# Patient Record
Sex: FEMALE | Race: WHITE | NOT HISPANIC OR LATINO | Employment: FULL TIME | URBAN - METROPOLITAN AREA
[De-identification: names, ages, dates, MRNs, and addresses within clinical notes are randomized per-mention and may not be internally consistent; named-entity substitution may affect disease eponyms.]

---

## 2023-02-11 PROBLEM — E55.9 VITAMIN D DEFICIENCY: Status: ACTIVE | Noted: 2023-02-11

## 2023-02-11 PROBLEM — M06.9 RHEUMATOID ARTHRITIS (MULTI): Status: ACTIVE | Noted: 2023-02-11

## 2023-02-11 PROBLEM — G43.909 MIGRAINE: Status: ACTIVE | Noted: 2023-02-11

## 2023-02-11 PROBLEM — R59.1 LYMPHADENOPATHY: Status: ACTIVE | Noted: 2023-02-11

## 2023-02-11 PROBLEM — M41.9 KYPHOSCOLIOSIS: Status: ACTIVE | Noted: 2023-02-11

## 2023-02-11 PROBLEM — I45.6 WOLFF-PARKINSON-WHITE (WPW) SYNDROME: Status: ACTIVE | Noted: 2023-02-11

## 2023-02-11 PROBLEM — M48.00 SPINAL STENOSIS: Status: ACTIVE | Noted: 2023-02-11

## 2023-02-11 PROBLEM — I34.0 MITRAL REGURGITATION: Status: ACTIVE | Noted: 2023-02-11

## 2023-02-11 PROBLEM — K02.62 DENTAL CARIES EXTENDING INTO DENTIN: Status: ACTIVE | Noted: 2023-02-11

## 2023-02-11 PROBLEM — L73.1 INGROWN HAIR: Status: ACTIVE | Noted: 2023-02-11

## 2023-02-11 PROBLEM — N81.10 FEMALE BLADDER PROLAPSE: Status: ACTIVE | Noted: 2023-02-11

## 2023-02-11 PROBLEM — M81.0 OSTEOPOROSIS, POST-MENOPAUSAL: Status: ACTIVE | Noted: 2023-02-11

## 2023-02-11 PROBLEM — N95.2 ATROPHY OF VAGINA: Status: ACTIVE | Noted: 2023-02-11

## 2023-02-11 PROBLEM — N20.0 NEPHROLITHIASIS: Status: ACTIVE | Noted: 2023-02-11

## 2023-02-11 PROBLEM — M71.21 BAKER CYST, RIGHT: Status: ACTIVE | Noted: 2023-02-11

## 2023-02-11 PROBLEM — N81.4 CYSTOCELE WITH UTERINE PROLAPSE: Status: ACTIVE | Noted: 2023-02-11

## 2023-02-11 PROBLEM — I07.1 TRICUSPID REGURGITATION: Status: ACTIVE | Noted: 2023-02-11

## 2023-02-11 PROBLEM — I10 BENIGN ESSENTIAL HYPERTENSION: Status: ACTIVE | Noted: 2023-02-11

## 2023-02-11 PROBLEM — N39.46 MIXED INCONTINENCE URGE AND STRESS: Status: ACTIVE | Noted: 2023-02-11

## 2023-02-11 RX ORDER — LISINOPRIL 10 MG/1
TABLET ORAL
COMMUNITY
Start: 2014-04-02 | End: 2023-05-30 | Stop reason: SDUPTHER

## 2023-02-11 RX ORDER — FOLIC ACID 1 MG/1
TABLET ORAL
COMMUNITY
End: 2024-01-11 | Stop reason: SDUPTHER

## 2023-02-11 RX ORDER — AMITRIPTYLINE HYDROCHLORIDE 10 MG/1
TABLET, FILM COATED ORAL
COMMUNITY
Start: 2019-08-28 | End: 2023-05-04 | Stop reason: SDUPTHER

## 2023-02-11 RX ORDER — ESTRADIOL 0.1 MG/G
4 CREAM VAGINAL 2 TIMES WEEKLY
COMMUNITY
Start: 2020-06-09

## 2023-02-11 RX ORDER — CHOLECALCIFEROL (VITAMIN D3) 125 MCG
CAPSULE ORAL
COMMUNITY

## 2023-02-11 RX ORDER — ACETAMINOPHEN 500 MG
TABLET ORAL
COMMUNITY

## 2023-02-11 RX ORDER — LEFLUNOMIDE 10 MG/1
TABLET ORAL
COMMUNITY
End: 2024-01-11 | Stop reason: SDUPTHER

## 2023-02-11 RX ORDER — METOPROLOL SUCCINATE 50 MG/1
TABLET, EXTENDED RELEASE ORAL
COMMUNITY
Start: 2022-05-06 | End: 2023-07-11 | Stop reason: SDUPTHER

## 2023-02-11 RX ORDER — HYDROXYCHLOROQUINE SULFATE 200 MG/1
TABLET, FILM COATED ORAL
COMMUNITY
Start: 2021-09-03 | End: 2024-01-30 | Stop reason: SDUPTHER

## 2023-02-11 RX ORDER — GINSENG 100 MG
CAPSULE ORAL
Status: ON HOLD | COMMUNITY
Start: 2014-04-02 | End: 2024-04-13 | Stop reason: ENTERED-IN-ERROR

## 2023-02-11 RX ORDER — DOCUSATE SODIUM 100 MG/1
1 CAPSULE, LIQUID FILLED ORAL DAILY
COMMUNITY

## 2023-03-14 ENCOUNTER — OFFICE VISIT (OUTPATIENT)
Dept: PRIMARY CARE | Facility: CLINIC | Age: 68
End: 2023-03-14
Payer: COMMERCIAL

## 2023-03-14 VITALS
TEMPERATURE: 97.5 F | BODY MASS INDEX: 23.63 KG/M2 | WEIGHT: 112.6 LBS | SYSTOLIC BLOOD PRESSURE: 123 MMHG | HEART RATE: 76 BPM | HEIGHT: 58 IN | DIASTOLIC BLOOD PRESSURE: 82 MMHG | RESPIRATION RATE: 16 BRPM

## 2023-03-14 DIAGNOSIS — N39.46 MIXED INCONTINENCE URGE AND STRESS: ICD-10-CM

## 2023-03-14 DIAGNOSIS — Z12.31 ENCOUNTER FOR SCREENING MAMMOGRAM FOR MALIGNANT NEOPLASM OF BREAST: ICD-10-CM

## 2023-03-14 DIAGNOSIS — I45.6 WOLFF-PARKINSON-WHITE (WPW) SYNDROME: ICD-10-CM

## 2023-03-14 DIAGNOSIS — I10 BENIGN ESSENTIAL HYPERTENSION: ICD-10-CM

## 2023-03-14 DIAGNOSIS — M81.0 OSTEOPOROSIS, POST-MENOPAUSAL: ICD-10-CM

## 2023-03-14 DIAGNOSIS — Z00.00 WELL ADULT HEALTH CHECK: Primary | ICD-10-CM

## 2023-03-14 PROCEDURE — 3079F DIAST BP 80-89 MM HG: CPT | Performed by: INTERNAL MEDICINE

## 2023-03-14 PROCEDURE — 3074F SYST BP LT 130 MM HG: CPT | Performed by: INTERNAL MEDICINE

## 2023-03-14 PROCEDURE — 99213 OFFICE O/P EST LOW 20 MIN: CPT | Performed by: INTERNAL MEDICINE

## 2023-03-14 PROCEDURE — 1159F MED LIST DOCD IN RCRD: CPT | Performed by: INTERNAL MEDICINE

## 2023-03-14 PROCEDURE — 1036F TOBACCO NON-USER: CPT | Performed by: INTERNAL MEDICINE

## 2023-03-14 ASSESSMENT — ENCOUNTER SYMPTOMS
SINUS PAIN: 0
CHILLS: 0
HEADACHES: 0
ABDOMINAL PAIN: 0
VOMITING: 0
WEAKNESS: 0
JOINT SWELLING: 0
SPEECH DIFFICULTY: 0
FATIGUE: 0
SHORTNESS OF BREATH: 0
DIARRHEA: 0
EYE DISCHARGE: 0
DIFFICULTY URINATING: 0
DIAPHORESIS: 0
CONSTIPATION: 0
NAUSEA: 0
FREQUENCY: 0
ARTHRALGIAS: 0
APPETITE CHANGE: 0
COUGH: 0
WHEEZING: 0
CHEST TIGHTNESS: 0
SORE THROAT: 0
DIZZINESS: 0
PALPITATIONS: 0
HYPERTENSION: 1
FEVER: 0

## 2023-03-14 ASSESSMENT — PATIENT HEALTH QUESTIONNAIRE - PHQ9
1. LITTLE INTEREST OR PLEASURE IN DOING THINGS: NOT AT ALL
SUM OF ALL RESPONSES TO PHQ9 QUESTIONS 1 & 2: 0
2. FEELING DOWN, DEPRESSED OR HOPELESS: NOT AT ALL

## 2023-03-14 NOTE — PROGRESS NOTES
"Subjective   Danielle Deshpande is a 68 y.o. female who presents for Hypertension and Sinusitis (Was on office 3 weeks ago for a sinus infection. Was told  to F/U. Patient is feeling much better just a little nasal congestion. ).  Today she is accompanied by alone.     Had gastrointestinal symptoms of vomiting and diarrhea and loss of appetite which was going through the family which was visiting from out of time. Better now and back to normal.   Seen for bronchitis and congestion and sinus pressure.  Seems to have abated.    Was on Augmentin form the [facility] and then continued here.      Hypertension  This is a chronic problem. The problem is unchanged. Pertinent negatives include no chest pain, headaches, palpitations or shortness of breath.   Sinusitis  Pertinent negatives include no chills, congestion, coughing, diaphoresis, ear pain, headaches, shortness of breath or sore throat.       Review of Systems   Constitutional:  Negative for appetite change, chills, diaphoresis, fatigue and fever.   HENT:  Negative for congestion, ear discharge, ear pain, sinus pain and sore throat.    Eyes:  Negative for discharge.   Respiratory:  Negative for cough, chest tightness, shortness of breath and wheezing.    Cardiovascular:  Negative for chest pain, palpitations and leg swelling.   Gastrointestinal:  Negative for abdominal pain, constipation, diarrhea, nausea and vomiting.   Endocrine: Negative for cold intolerance, heat intolerance and polyuria.   Genitourinary:  Negative for difficulty urinating and frequency.   Musculoskeletal:  Negative for arthralgias and joint swelling.   Skin:  Negative for rash.   Neurological:  Negative for dizziness, speech difficulty, weakness and headaches.       Objective   /82   Pulse 76   Temp 36.4 °C (97.5 °F)   Resp 16   Ht 1.473 m (4' 10\")   Wt 51.1 kg (112 lb 9.6 oz)   BMI 23.53 kg/m²   BSA: 1.45 meters squared    Physical Exam  Constitutional:       Appearance: Normal " appearance.   HENT:      Head: Normocephalic.   Pulmonary:      Effort: Pulmonary effort is normal.   Abdominal:      General: Abdomen is flat. Bowel sounds are normal.      Palpations: Abdomen is soft.   Musculoskeletal:      Cervical back: Neck supple.   Skin:     General: Skin is warm and dry.         Assessment/Plan   Problem List Items Addressed This Visit          Circulatory    Benign essential hypertension    Felton-Parkinson-White (WPW) syndrome       Musculoskeletal    Osteoporosis, post-menopausal       Other    Mixed incontinence urge and stress    Well adult health check - Primary    Relevant Orders    Comprehensive Metabolic Panel    CBC    Lipid Panel    Prostate Specific Antigen    TSH with reflex to Free T4 if abnormal    BI mammo bilateral screening tomosynthesis     Encounter Diagnoses   Name Primary?    Well adult health check Yes    Benign essential hypertension     Felton-Parkinson-White (WPW) syndrome     Osteoporosis, post-menopausal     Mixed incontinence urge and stress      West Pizano, DO

## 2023-05-04 DIAGNOSIS — Z00.00 HEALTHCARE MAINTENANCE: ICD-10-CM

## 2023-05-04 RX ORDER — AMITRIPTYLINE HYDROCHLORIDE 10 MG/1
10 TABLET, FILM COATED ORAL NIGHTLY
Qty: 90 TABLET | Refills: 0 | Status: SHIPPED | OUTPATIENT
Start: 2023-05-04 | End: 2023-05-05 | Stop reason: SDUPTHER

## 2023-05-05 DIAGNOSIS — Z00.00 HEALTHCARE MAINTENANCE: ICD-10-CM

## 2023-05-05 RX ORDER — AMITRIPTYLINE HYDROCHLORIDE 10 MG/1
10 TABLET, FILM COATED ORAL NIGHTLY
Qty: 90 TABLET | Refills: 0 | Status: SHIPPED
Start: 2023-05-05 | End: 2023-09-07 | Stop reason: SDUPTHER

## 2023-05-26 ENCOUNTER — TELEPHONE (OUTPATIENT)
Dept: PRIMARY CARE | Facility: CLINIC | Age: 68
End: 2023-05-26
Payer: COMMERCIAL

## 2023-05-30 DIAGNOSIS — I10 BENIGN ESSENTIAL HYPERTENSION: ICD-10-CM

## 2023-05-31 RX ORDER — LISINOPRIL 10 MG/1
10 TABLET ORAL DAILY
Qty: 90 TABLET | Refills: 1 | Status: SHIPPED | OUTPATIENT
Start: 2023-05-31 | End: 2023-09-07 | Stop reason: SDUPTHER

## 2023-07-11 ENCOUNTER — TELEPHONE (OUTPATIENT)
Dept: PRIMARY CARE | Facility: CLINIC | Age: 68
End: 2023-07-11
Payer: COMMERCIAL

## 2023-07-11 DIAGNOSIS — I10 BENIGN ESSENTIAL HYPERTENSION: ICD-10-CM

## 2023-07-11 RX ORDER — METOPROLOL SUCCINATE 50 MG/1
50 TABLET, EXTENDED RELEASE ORAL DAILY
Qty: 90 TABLET | Refills: 3 | Status: SHIPPED | OUTPATIENT
Start: 2023-07-11 | End: 2023-09-07 | Stop reason: SDUPTHER

## 2023-08-17 ENCOUNTER — OFFICE VISIT (OUTPATIENT)
Dept: PRIMARY CARE | Facility: CLINIC | Age: 68
End: 2023-08-17
Payer: COMMERCIAL

## 2023-08-17 VITALS
SYSTOLIC BLOOD PRESSURE: 124 MMHG | RESPIRATION RATE: 18 BRPM | BODY MASS INDEX: 23.2 KG/M2 | HEART RATE: 78 BPM | WEIGHT: 111 LBS | OXYGEN SATURATION: 97 % | TEMPERATURE: 97.3 F | DIASTOLIC BLOOD PRESSURE: 82 MMHG

## 2023-08-17 DIAGNOSIS — J01.90 ACUTE SINUSITIS WITH SYMPTOMS > 10 DAYS: Primary | ICD-10-CM

## 2023-08-17 PROCEDURE — 3079F DIAST BP 80-89 MM HG: CPT | Performed by: NURSE PRACTITIONER

## 2023-08-17 PROCEDURE — 1160F RVW MEDS BY RX/DR IN RCRD: CPT | Performed by: NURSE PRACTITIONER

## 2023-08-17 PROCEDURE — 1126F AMNT PAIN NOTED NONE PRSNT: CPT | Performed by: NURSE PRACTITIONER

## 2023-08-17 PROCEDURE — 99213 OFFICE O/P EST LOW 20 MIN: CPT | Performed by: NURSE PRACTITIONER

## 2023-08-17 PROCEDURE — 3074F SYST BP LT 130 MM HG: CPT | Performed by: NURSE PRACTITIONER

## 2023-08-17 PROCEDURE — 1159F MED LIST DOCD IN RCRD: CPT | Performed by: NURSE PRACTITIONER

## 2023-08-17 PROCEDURE — 1036F TOBACCO NON-USER: CPT | Performed by: NURSE PRACTITIONER

## 2023-08-17 RX ORDER — METHYLPREDNISOLONE 4 MG/1
TABLET ORAL
Qty: 21 TABLET | Refills: 0 | Status: SHIPPED | OUTPATIENT
Start: 2023-08-17 | End: 2023-08-24

## 2023-08-17 RX ORDER — AMOXICILLIN AND CLAVULANATE POTASSIUM 875; 125 MG/1; MG/1
875 TABLET, FILM COATED ORAL 2 TIMES DAILY
Qty: 20 TABLET | Refills: 0 | Status: SHIPPED | OUTPATIENT
Start: 2023-08-17 | End: 2023-08-27

## 2023-08-17 ASSESSMENT — ENCOUNTER SYMPTOMS
DIARRHEA: 0
COUGH: 0
SINUS PAIN: 1
NAUSEA: 0
FEVER: 0
CONSTIPATION: 0
HEADACHES: 1
RHINORRHEA: 1
APPETITE CHANGE: 0
CHILLS: 0
FATIGUE: 1
MYALGIAS: 0
ABDOMINAL PAIN: 0
SINUS PRESSURE: 1
SHORTNESS OF BREATH: 0
SORE THROAT: 0
WHEEZING: 0

## 2023-08-17 NOTE — PROGRESS NOTES
"Subjective   Patient ID: Danielle Deshpande is a 68 y.o. female who presents for Sinusitis.    Patient has been very stressed since July. For the past three weeks, she has had some sinus congestion. Pt has been using flonase and says that her left nostril is irritated. She has had thick clear nasal drainage. Patient is vaccinated against COVID. She declines need for testing.  Pt tried sudafed but she says that \"it made her heart erratic\".     Patient was diagnosed with vestibular migraines seven years ago. Patient is on amitriptyline and she increases it (two weeks at a time) when she has these migraines. Patient says that she has pressure in her left cheek. She gets a muscle spasm in her trapezoid.           Review of Systems   Constitutional:  Positive for fatigue. Negative for appetite change, chills and fever.   HENT:  Positive for congestion, postnasal drip, rhinorrhea, sinus pressure and sinus pain. Negative for sore throat.    Respiratory:  Negative for cough, shortness of breath and wheezing.    Cardiovascular:  Negative for chest pain.   Gastrointestinal:  Negative for abdominal pain, constipation, diarrhea and nausea.   Musculoskeletal:  Negative for myalgias.   Neurological:  Positive for headaches.       Objective   /82   Pulse 78   Temp 36.3 °C (97.3 °F) (Temporal)   Resp 18   Wt 50.3 kg (111 lb)   SpO2 97%   BMI 23.20 kg/m²     Physical Exam  Vitals reviewed.   Constitutional:       General: She is not in acute distress.     Appearance: Normal appearance. She is not ill-appearing.   HENT:      Head: Atraumatic.      Right Ear: Tympanic membrane, ear canal and external ear normal.      Left Ear: Tympanic membrane, ear canal and external ear normal.      Nose: Congestion and rhinorrhea present.      Right Sinus: No maxillary sinus tenderness or frontal sinus tenderness.      Left Sinus: Maxillary sinus tenderness and frontal sinus tenderness present.      Mouth/Throat:      Pharynx: No " oropharyngeal exudate or posterior oropharyngeal erythema.      Comments: Uvula midline  Eyes:      Conjunctiva/sclera: Conjunctivae normal.   Cardiovascular:      Rate and Rhythm: Normal rate and regular rhythm.      Heart sounds: Normal heart sounds. No murmur heard.  Pulmonary:      Effort: Pulmonary effort is normal.      Breath sounds: Normal breath sounds. No wheezing or rhonchi.   Skin:     General: Skin is warm and dry.   Neurological:      General: No focal deficit present.      Mental Status: She is alert.   Psychiatric:         Mood and Affect: Mood normal.         Behavior: Behavior normal.     Assessment/Plan   Problem List Items Addressed This Visit    None  Visit Diagnoses       Acute sinusitis with symptoms > 10 days    -  Primary    Relevant Medications    amoxicillin-pot clavulanate (Augmentin) 875-125 mg tablet    methylPREDNISolone (Medrol Dospak) 4 mg tablets        Pt declined need for COVID test. Will start pt on augmentin at this time. Will also start patient on medrol dusty. Reminded pt to avoid other anti-inflammatories while on the steroids; she agreed. Advised patient on use of humidifier and hot steam treatments. Discussed that patient is to drink plenty of fluids and stay well hydrated. Can take tylenol as needed for any fevers or discomfort. Patient can use flonase as well. Discussed that patient is to go to the ER for any chest pain, difficulty breathing, shortness of breath or new/concerning symptoms; she agreed. Pt to follow up in 2-3 days if no better despite the use of the medications.

## 2023-08-30 ENCOUNTER — LAB (OUTPATIENT)
Dept: LAB | Facility: LAB | Age: 68
End: 2023-08-30
Payer: COMMERCIAL

## 2023-08-30 DIAGNOSIS — Z00.00 WELL ADULT HEALTH CHECK: ICD-10-CM

## 2023-08-30 LAB
ALANINE AMINOTRANSFERASE (SGPT) (U/L) IN SER/PLAS: 16 U/L (ref 7–45)
ALBUMIN (G/DL) IN SER/PLAS: 3.9 G/DL (ref 3.4–5)
ALKALINE PHOSPHATASE (U/L) IN SER/PLAS: 73 U/L (ref 33–136)
ANION GAP IN SER/PLAS: 10 MMOL/L (ref 10–20)
ASPARTATE AMINOTRANSFERASE (SGOT) (U/L) IN SER/PLAS: 23 U/L (ref 9–39)
BILIRUBIN TOTAL (MG/DL) IN SER/PLAS: 0.5 MG/DL (ref 0–1.2)
CALCIUM (MG/DL) IN SER/PLAS: 9 MG/DL (ref 8.6–10.3)
CARBON DIOXIDE, TOTAL (MMOL/L) IN SER/PLAS: 32 MMOL/L (ref 21–32)
CHLORIDE (MMOL/L) IN SER/PLAS: 103 MMOL/L (ref 98–107)
CHOLESTEROL (MG/DL) IN SER/PLAS: 141 MG/DL (ref 0–199)
CHOLESTEROL IN HDL (MG/DL) IN SER/PLAS: 56.5 MG/DL
CHOLESTEROL/HDL RATIO: 2.5
CREATININE (MG/DL) IN SER/PLAS: 0.83 MG/DL (ref 0.5–1.05)
ERYTHROCYTE DISTRIBUTION WIDTH (RATIO) BY AUTOMATED COUNT: 12.2 % (ref 11.5–14.5)
ERYTHROCYTE MEAN CORPUSCULAR HEMOGLOBIN CONCENTRATION (G/DL) BY AUTOMATED: 33.5 G/DL (ref 32–36)
ERYTHROCYTE MEAN CORPUSCULAR VOLUME (FL) BY AUTOMATED COUNT: 94 FL (ref 80–100)
ERYTHROCYTES (10*6/UL) IN BLOOD BY AUTOMATED COUNT: 4.5 X10E12/L (ref 4–5.2)
GFR FEMALE: 76 ML/MIN/1.73M2
GLUCOSE (MG/DL) IN SER/PLAS: 79 MG/DL (ref 74–99)
HEMATOCRIT (%) IN BLOOD BY AUTOMATED COUNT: 42.4 % (ref 36–46)
HEMOGLOBIN (G/DL) IN BLOOD: 14.2 G/DL (ref 12–16)
LDL: 72 MG/DL (ref 0–99)
LEUKOCYTES (10*3/UL) IN BLOOD BY AUTOMATED COUNT: 4.4 X10E9/L (ref 4.4–11.3)
PLATELETS (10*3/UL) IN BLOOD AUTOMATED COUNT: 218 X10E9/L (ref 150–450)
POTASSIUM (MMOL/L) IN SER/PLAS: 4 MMOL/L (ref 3.5–5.3)
PROSTATE SPECIFIC AG (NG/ML) IN SER/PLAS: <0.01 NG/ML (ref 0–0.1)
PROTEIN TOTAL: 5.9 G/DL (ref 6.4–8.2)
SODIUM (MMOL/L) IN SER/PLAS: 141 MMOL/L (ref 136–145)
THYROTROPIN (MIU/L) IN SER/PLAS BY DETECTION LIMIT <= 0.05 MIU/L: 2.19 MIU/L (ref 0.44–3.98)
TRIGLYCERIDE (MG/DL) IN SER/PLAS: 61 MG/DL (ref 0–149)
UREA NITROGEN (MG/DL) IN SER/PLAS: 20 MG/DL (ref 6–23)
VLDL: 12 MG/DL (ref 0–40)

## 2023-08-30 PROCEDURE — 84153 ASSAY OF PSA TOTAL: CPT

## 2023-08-30 PROCEDURE — 85027 COMPLETE CBC AUTOMATED: CPT

## 2023-08-30 PROCEDURE — 80061 LIPID PANEL: CPT

## 2023-08-30 PROCEDURE — 80053 COMPREHEN METABOLIC PANEL: CPT

## 2023-08-30 PROCEDURE — 36415 COLL VENOUS BLD VENIPUNCTURE: CPT

## 2023-08-30 PROCEDURE — 84443 ASSAY THYROID STIM HORMONE: CPT

## 2023-09-07 ENCOUNTER — OFFICE VISIT (OUTPATIENT)
Dept: PRIMARY CARE | Facility: CLINIC | Age: 68
End: 2023-09-07
Payer: COMMERCIAL

## 2023-09-07 VITALS
DIASTOLIC BLOOD PRESSURE: 78 MMHG | HEART RATE: 84 BPM | TEMPERATURE: 97.8 F | SYSTOLIC BLOOD PRESSURE: 133 MMHG | OXYGEN SATURATION: 97 % | BODY MASS INDEX: 23.97 KG/M2 | RESPIRATION RATE: 16 BRPM | WEIGHT: 114.2 LBS | HEIGHT: 58 IN

## 2023-09-07 DIAGNOSIS — Z23 NEED FOR VACCINATION: ICD-10-CM

## 2023-09-07 DIAGNOSIS — I45.6 WOLFF-PARKINSON-WHITE (WPW) SYNDROME: ICD-10-CM

## 2023-09-07 DIAGNOSIS — M06.9 RHEUMATOID ARTHRITIS INVOLVING MULTIPLE SITES, UNSPECIFIED WHETHER RHEUMATOID FACTOR PRESENT (MULTI): ICD-10-CM

## 2023-09-07 DIAGNOSIS — Z00.00 HEALTHCARE MAINTENANCE: ICD-10-CM

## 2023-09-07 DIAGNOSIS — Z11.59 NEED FOR HEPATITIS C SCREENING TEST: ICD-10-CM

## 2023-09-07 DIAGNOSIS — M81.0 OSTEOPOROSIS, POST-MENOPAUSAL: ICD-10-CM

## 2023-09-07 DIAGNOSIS — Z13.1 DIABETES MELLITUS SCREENING: ICD-10-CM

## 2023-09-07 DIAGNOSIS — I48.0 PAF (PAROXYSMAL ATRIAL FIBRILLATION) (MULTI): ICD-10-CM

## 2023-09-07 DIAGNOSIS — I10 BENIGN ESSENTIAL HYPERTENSION: ICD-10-CM

## 2023-09-07 DIAGNOSIS — N20.0 NEPHROLITHIASIS: ICD-10-CM

## 2023-09-07 DIAGNOSIS — Z00.00 ROUTINE GENERAL MEDICAL EXAMINATION AT HEALTH CARE FACILITY: Primary | ICD-10-CM

## 2023-09-07 DIAGNOSIS — Z12.11 SCREENING FOR COLORECTAL CANCER: ICD-10-CM

## 2023-09-07 DIAGNOSIS — G43.909 MIGRAINE WITHOUT STATUS MIGRAINOSUS, NOT INTRACTABLE, UNSPECIFIED MIGRAINE TYPE: ICD-10-CM

## 2023-09-07 DIAGNOSIS — M41.9 KYPHOSCOLIOSIS: ICD-10-CM

## 2023-09-07 DIAGNOSIS — Z12.12 SCREENING FOR COLORECTAL CANCER: ICD-10-CM

## 2023-09-07 DIAGNOSIS — Z78.0 ASYMPTOMATIC MENOPAUSAL STATE: ICD-10-CM

## 2023-09-07 PROBLEM — H93.12 LEFT-SIDED TINNITUS: Status: RESOLVED | Noted: 2018-08-06 | Resolved: 2023-09-07

## 2023-09-07 PROBLEM — H90.3 SENSORINEURAL HEARING LOSS, BILATERAL: Status: ACTIVE | Noted: 2018-08-06

## 2023-09-07 PROBLEM — H93.12 LEFT-SIDED TINNITUS: Status: ACTIVE | Noted: 2018-08-06

## 2023-09-07 PROBLEM — R82.994 HYPERCALCIURIA: Status: ACTIVE | Noted: 2023-09-07

## 2023-09-07 PROBLEM — I49.9 ARRHYTHMIA: Status: ACTIVE | Noted: 2023-09-07

## 2023-09-07 PROBLEM — H81.10 BPPV (BENIGN PAROXYSMAL POSITIONAL VERTIGO): Status: ACTIVE | Noted: 2021-01-04

## 2023-09-07 PROBLEM — R59.0 LYMPHADENOPATHY, POSTERIOR CERVICAL: Status: ACTIVE | Noted: 2023-09-07

## 2023-09-07 PROCEDURE — 90471 IMMUNIZATION ADMIN: CPT | Performed by: INTERNAL MEDICINE

## 2023-09-07 PROCEDURE — 1126F AMNT PAIN NOTED NONE PRSNT: CPT | Performed by: INTERNAL MEDICINE

## 2023-09-07 PROCEDURE — 99214 OFFICE O/P EST MOD 30 MIN: CPT | Performed by: INTERNAL MEDICINE

## 2023-09-07 PROCEDURE — 90677 PCV20 VACCINE IM: CPT | Performed by: INTERNAL MEDICINE

## 2023-09-07 PROCEDURE — 90662 IIV NO PRSV INCREASED AG IM: CPT | Performed by: INTERNAL MEDICINE

## 2023-09-07 PROCEDURE — 1036F TOBACCO NON-USER: CPT | Performed by: INTERNAL MEDICINE

## 2023-09-07 PROCEDURE — 1159F MED LIST DOCD IN RCRD: CPT | Performed by: INTERNAL MEDICINE

## 2023-09-07 PROCEDURE — 90472 IMMUNIZATION ADMIN EACH ADD: CPT | Performed by: INTERNAL MEDICINE

## 2023-09-07 PROCEDURE — 1170F FXNL STATUS ASSESSED: CPT | Performed by: INTERNAL MEDICINE

## 2023-09-07 PROCEDURE — 93000 ELECTROCARDIOGRAM COMPLETE: CPT | Performed by: INTERNAL MEDICINE

## 2023-09-07 PROCEDURE — G0439 PPPS, SUBSEQ VISIT: HCPCS | Performed by: INTERNAL MEDICINE

## 2023-09-07 PROCEDURE — 3078F DIAST BP <80 MM HG: CPT | Performed by: INTERNAL MEDICINE

## 2023-09-07 PROCEDURE — 1160F RVW MEDS BY RX/DR IN RCRD: CPT | Performed by: INTERNAL MEDICINE

## 2023-09-07 PROCEDURE — 3075F SYST BP GE 130 - 139MM HG: CPT | Performed by: INTERNAL MEDICINE

## 2023-09-07 RX ORDER — AMITRIPTYLINE HYDROCHLORIDE 10 MG/1
15 TABLET, FILM COATED ORAL NIGHTLY
Qty: 135 TABLET | Refills: 3 | Status: SHIPPED | OUTPATIENT
Start: 2023-09-07 | End: 2024-02-20 | Stop reason: SDUPTHER

## 2023-09-07 RX ORDER — METOPROLOL SUCCINATE 50 MG/1
50 TABLET, EXTENDED RELEASE ORAL DAILY
Qty: 90 TABLET | Refills: 3 | Status: SHIPPED | OUTPATIENT
Start: 2023-09-07 | End: 2024-05-08 | Stop reason: SDUPTHER

## 2023-09-07 RX ORDER — LISINOPRIL 10 MG/1
10 TABLET ORAL DAILY
Qty: 90 TABLET | Refills: 3 | Status: SHIPPED | OUTPATIENT
Start: 2023-09-07 | End: 2024-09-06

## 2023-09-07 ASSESSMENT — PAIN SCALES - GENERAL: PAINLEVEL: 0-NO PAIN

## 2023-09-07 ASSESSMENT — PATIENT HEALTH QUESTIONNAIRE - PHQ9
SUM OF ALL RESPONSES TO PHQ9 QUESTIONS 1 AND 2: 0
2. FEELING DOWN, DEPRESSED OR HOPELESS: NOT AT ALL
1. LITTLE INTEREST OR PLEASURE IN DOING THINGS: NOT AT ALL

## 2023-09-07 ASSESSMENT — ENCOUNTER SYMPTOMS
LOSS OF SENSATION IN FEET: 0
OCCASIONAL FEELINGS OF UNSTEADINESS: 0
DEPRESSION: 0

## 2023-09-07 ASSESSMENT — ACTIVITIES OF DAILY LIVING (ADL)
TAKING_MEDICATION: INDEPENDENT
DRESSING: INDEPENDENT
DOING_HOUSEWORK: INDEPENDENT
GROCERY_SHOPPING: INDEPENDENT
MANAGING_FINANCES: INDEPENDENT
BATHING: INDEPENDENT

## 2023-09-07 NOTE — PROGRESS NOTES
"Subjective   Danielle Deshpande is a 68 y.o. female who presents for Medicare Annual Wellness Visit Subsequent.      Patient has POA and living will but not on file   Last mammogram was 3.14.2023  Last Dexa 12.3.2020  Last colonoscopy was 3.30.2015    Has been through some family issues.  Daughter had a wedding and mom passed at 88 years old quietly in her sleep.    Reviewed history of migraine headaches and had increased her dose to 15 mg.  For the last 2 months this has been an issue.  Has had tight trapezoid muscle on the left but associated likely with her scoliosis.  Gets a left sided headache and visual changes and vision gets less clear but no lines or  lights seen.    Daily at first and varying degrees of pain and ice helped.  Now has decreased to sporadic presentation.  Pain sometimes last 15 minutes then decreases.  Most occur in the morning.  Occasionally improved with cold water bottle on her shoulder helps.    Neck cracks frequently.    Rheumatologist diagnosed her with RA.    She is a Holistic rheumatologist and trying osteobiflex, hydroxychloroquine.   Getting Dental work so holding off for now on treatment.        Review of Systems   All other systems reviewed and are negative.      Objective   /78   Pulse 84   Temp 36.6 °C (97.8 °F)   Resp 16   Ht 1.473 m (4' 10\")   Wt 51.8 kg (114 lb 3.2 oz)   SpO2 97%   BMI 23.87 kg/m²       Physical Exam  Constitutional:       General: She is not in acute distress.     Appearance: She is not toxic-appearing.   HENT:      Right Ear: Tympanic membrane, ear canal and external ear normal. There is no impacted cerumen.      Left Ear: Tympanic membrane, ear canal and external ear normal. There is no impacted cerumen.      Nose: Nose normal.      Mouth/Throat:      Mouth: Mucous membranes are dry.      Pharynx: Oropharynx is clear.   Eyes:      Extraocular Movements: Extraocular movements intact.      Conjunctiva/sclera: Conjunctivae normal.      Pupils: " Pupils are equal, round, and reactive to light.   Cardiovascular:      Rate and Rhythm: Normal rate and regular rhythm.      Pulses: Normal pulses.      Heart sounds: Normal heart sounds.   Pulmonary:      Effort: Pulmonary effort is normal.      Breath sounds: Normal breath sounds.   Abdominal:      General: Abdomen is flat.   Musculoskeletal:         General: Normal range of motion.        Arms:       Cervical back: Normal range of motion and neck supple.      Right lower leg: No edema.      Left lower leg: No edema.      Comments: Significant scoliosis, with right shoulder elevation and hip height discrepancy.  Some rotation of her left leg with hip rotation noted.   Skin:     General: Skin is warm and dry.   Neurological:      Mental Status: She is alert.   Psychiatric:         Mood and Affect: Mood normal.         Behavior: Behavior normal.         Assessment/Plan   Problem List Items Addressed This Visit    None    No diagnosis found.  West Pizano DO

## 2023-09-08 ENCOUNTER — APPOINTMENT (OUTPATIENT)
Dept: PRIMARY CARE | Facility: CLINIC | Age: 68
End: 2023-09-08
Payer: COMMERCIAL

## 2023-10-09 DIAGNOSIS — N20.0 NEPHROLITHIASIS: ICD-10-CM

## 2023-10-10 ENCOUNTER — EVALUATION (OUTPATIENT)
Dept: PHYSICAL THERAPY | Facility: CLINIC | Age: 68
End: 2023-10-10
Payer: COMMERCIAL

## 2023-10-10 DIAGNOSIS — M41.9 KYPHOSCOLIOSIS: ICD-10-CM

## 2023-10-10 DIAGNOSIS — M54.9 BILATERAL BACK PAIN, UNSPECIFIED BACK LOCATION, UNSPECIFIED CHRONICITY: ICD-10-CM

## 2023-10-10 DIAGNOSIS — M54.2 NECK PAIN: Primary | ICD-10-CM

## 2023-10-10 DIAGNOSIS — R53.1 WEAKNESS: ICD-10-CM

## 2023-10-10 PROCEDURE — 97162 PT EVAL MOD COMPLEX 30 MIN: CPT | Mod: GP | Performed by: PHYSICAL THERAPIST

## 2023-10-10 ASSESSMENT — PAIN SCALES - GENERAL
PAINLEVEL_OUTOF10: 0 - NO PAIN
PAINLEVEL_OUTOF10: 2

## 2023-10-10 ASSESSMENT — PAIN - FUNCTIONAL ASSESSMENT: PAIN_FUNCTIONAL_ASSESSMENT: 0-10

## 2023-10-10 NOTE — Clinical Note
October 10, 2023     Patient: Danielle Deshpande   YOB: 1955   Date of Visit: 10/10/2023       To Whom It May Concern:    It is my medical opinion that Danielle Deshpande {Work release (duty restriction):09983}.    If you have any questions or concerns, please don't hesitate to call.         Sincerely,        Anna Landrum, PT    CC: No Recipients

## 2023-10-10 NOTE — Clinical Note
October 10, 2023     Patient: Danielle Deshpande   YOB: 1955   Date of Visit: 10/10/2023       To Whom it May Concern:    Danielle Deshpande was seen in my clinic on 10/10/2023. She {Return to school/sport:78114}.    If you have any questions or concerns, please don't hesitate to call.         Sincerely,          Anna Landrum, PT        CC: No Recipients

## 2023-10-10 NOTE — PROGRESS NOTES
Physical Therapy Evaluation    Patient Name: Danielle Deshpande  MRN: [id number]  Today's Date: 10/10/2023  Time Calculation  Start Time: 1637  Stop Time: 1715  Time Calculation (min): 38 min    Assessment   Pt demonstrates decreased lumbar/hipROM, shoulder/core/hip strength, activity tolerance, postural impairments, gait deviations, and pain resulting in difficulty with ADLs/functional mobility/work tasks per FAUSTO.  Pt will benefit from PT services to improve stability of the spine, provide proximal and distal support to the spine, reduce pain, as well as maximize ADLs/functional mobility/work tasks.    Pt verbalizes understanding of all pt education.       Plan  Treatment/Interventions: Education/ Instruction, Gait training, Manual therapy, Neuromuscular re-education, Self care/ home management, Therapeutic activities, Therapeutic exercises  PT Frequency:  (1-2x/week for 5-6 weeks)  Pt to call back and schedule.      Subjective   General:  General  Reason for Referral: Kyphoscoliosis  Referred By: Dr. West Pizano (PCP)  Precautions:  Precautions  Precautions Comment: Significant PMHx (6 items):  1. Hx spinal fusion surgery 1980 to attempt to correct kyphoscoliosis  2. Allergic to adhesive from bandages  3. Mitral and tricuspid regurgitation, Felton-Parkinson-White syndrome/fast heartbeat, A-fib (all treated with medication/F/U with cardiologist)  4. Osteoporosis  5. BPPV causing intermittent dizziness (treated with medication/F/U with neurologist)  6. RA (F/U with rheumatology)  Pain:  Pain Assessment: 0-10  Pain Score: 2  Pain Location: Neck  Multiple Pain Sites: Two    Pt arrives right on time to appointment.  Due to  complications pt was not provided with IE paperwork until 10 min into appointment time.  This and time to complete paperwork result in a shortened session.    Pt is 68 year old female who presents with (L) sided neck pain and (B) mid-lower back pain/extreme fatigue.  Pt was 12 years old  when she was dx with kyphoscoliosis.  From the ages 12 to 16 she wore a brace to attempt to correct the curvature.  The curvature continued to increase until she was in her early 20's resulting in her having a spinal fusion in 1980.  Pt notes onset of (L) sided neck and mid-back pain over the summer 2023 with stressful life events.  This pain has been improving but certain activities still can cause this pain.  Pt denies being given any precautions for current s/s.    IMAGING: Nothing recent    PMHx: RA (F/U with rheumatology), Hydronephrosis (R) kidney 2020, Kidney stones with hx of surgical removal (treated with medication/F/U with nephrologist), HTN (treated with medication, Mitral and tricuspid regurgitation (treated with medication/F/U with cardiologist), Felton-Parkinson-White (WPW) syndrome/fast heartbeat (treated with medication/F/U with cardiologist), A-fib (treated with medication/F/U with cardiologist), Osteoporosis, Migraines (treated by neurologist), Hx spinal fusion surgery 1980, (L) knee meniscectomy 10+ years ago, Hx fx (L) foot treated conservatively 10+ years ago, Hx fx (R) knee treated conservatively 5-10 years, BPPV causing intermittent dizziness (treated with medication/F/U with neurologist)    ALLERGIES: Antihistamine [Diphenhydramine Hcl], Antihistamine, Levofloxacin, Procaine, Adhesive on bandage    PAIN:   (L) sided neck and (B) mid-lower back pain  Pt's neck is the most concerning s/s  (L) sided neck pain  2/10 currently  Ranges 2/10 to 6-7/10  Described as tender, aching, burning, sharp  (B) mid-lower back pain  0/10 currently  Ranges 0/10 to 5/10  Described as soreness, dull, ache, extreme fatigue  Improves with CP massage and pain medication  Pt notes fairly constant n+t in (R>L) hands and constant n+t in (B) knees to feet that pt's rheumatologist is aware of; rheumatologist has ordered an EMG for these s/s    FUNCTIONAL:   - Current: Pt notes increased pain with stress, lifting >20# at  work/holding granddaughter, reaching OH with (L) UE into cabinet, and vacuuming.   - Previous: Pt was limited with ADLs but with less s/s    OCCUPATION: "[employer]       Objective   ROM  Shoulder AROM:  - Flexion: (B) 130 deg  - ABD: (B) WFL  - ER: (B) WFL  - IR: (B) WFL    Lumbar AROM:  - Extension  20%  - Flexion 70%  - SB (L) 20%, tight (L) neck     (R) 20%, tight (L) neck  - Rotation (L) 10%     (R) 30%  _____________________________________________    STRENGTH  Shoulder  - Flexion: (B) 3+/5  - ABD: (L) 4-/5, pain     (R) 4+/5  - ER: (B) 4/5  - IR: (B) 4/5    While seated pt demonstrates slight TA activation with Valsalva compensation.    Hip  - Extension (L) 4-/5      (R) 4/5    ______________________________________________________    POSTURE  Pt demonstrates significant forward head posture, significant kyphoscoliosis, and moderate rounded shoulders.  _______________________________________________________________    SCAPULAR MOVEMENT  During (B) shoulder ABD AROM to 90 deg, pt's scapulae demonstrate   _____________________________________________________________    PALPATION  Pt demonstrates tenderness to palpation of (L) UT/LS.  Significant tension noted (L>R) UT/LS, cervical paraspinals, and SCM.  ____________________________________________________    GAIT  Pt ambulates with moderate to significant anterior trunk lean throughout gait cycle and lateral trunk lean during (L) stance.      Outcome Measures:  Other Measures  Oswestry Disablity Index (FAUSTO): 11/50 = 22%       Treatment:  Visit 1 of 12 max  PT POC for 1-2x/week for 5-6 weeks    Need to discuss:  - DOMS    Exercises to add:  - UBE F/R  - Supine chin tucks  - (B) UT/LS stretches  - SL (B) shoulder ER/ABD  - TB row  - TB shoulder extension  - Supine TA activation practice  - Supine TA march  - Supine TA hip ABD      OP EDUCATION:   10-10-23: Pt educated on PT POC/benefits and okay discomfort vs pain that requires modification      Goals:  1. Pt will be  (I) with HEP for carryover of gains from skilled PT.  2. Pt will report reduction in pain to 4/10 at worst for improved ability to complete ADLs and enjoyable activities.  3. Improved FAUSTO score to demonstrate an improvement in quality of life.  4. Improved TA activation to at least moderate in standing for improved spinal stability.  5. Improved (B) shoulder strength to at least 4+/5 for improved stability.  6. Improved (B) hip extension strength to at least 4+/5 for improved stability.  7. Pt will be able to reach into top kitchen cabinet with (L) UE with minimal to no pain.  8. Pt will be able to lift up to 30# at work/hold her granddaughter with minimal to no pain.

## 2023-10-13 ENCOUNTER — LAB (OUTPATIENT)
Dept: LAB | Facility: LAB | Age: 68
End: 2023-10-13
Payer: COMMERCIAL

## 2023-10-13 DIAGNOSIS — Z11.59 NEED FOR HEPATITIS C SCREENING TEST: ICD-10-CM

## 2023-10-13 DIAGNOSIS — Z13.1 DIABETES MELLITUS SCREENING: ICD-10-CM

## 2023-10-13 LAB
ALBUMIN SERPL BCP-MCNC: 3.9 G/DL (ref 3.4–5)
ALP SERPL-CCNC: 74 U/L (ref 33–136)
ALT SERPL W P-5'-P-CCNC: 17 U/L (ref 7–45)
ANION GAP SERPL CALC-SCNC: 12 MMOL/L (ref 10–20)
AST SERPL W P-5'-P-CCNC: 23 U/L (ref 9–39)
BILIRUB SERPL-MCNC: 0.7 MG/DL (ref 0–1.2)
BUN SERPL-MCNC: 18 MG/DL (ref 6–23)
CALCIUM SERPL-MCNC: 9 MG/DL (ref 8.6–10.3)
CHLORIDE SERPL-SCNC: 104 MMOL/L (ref 98–107)
CO2 SERPL-SCNC: 31 MMOL/L (ref 21–32)
CREAT SERPL-MCNC: 0.82 MG/DL (ref 0.5–1.05)
GFR SERPL CREATININE-BSD FRML MDRD: 78 ML/MIN/1.73M*2
GLUCOSE SERPL-MCNC: 72 MG/DL (ref 74–99)
HCV AB SER QL: NONREACTIVE
POTASSIUM SERPL-SCNC: 3.9 MMOL/L (ref 3.5–5.3)
PROT SERPL-MCNC: 6.2 G/DL (ref 6.4–8.2)
SODIUM SERPL-SCNC: 143 MMOL/L (ref 136–145)

## 2023-10-13 PROCEDURE — 86803 HEPATITIS C AB TEST: CPT

## 2023-10-13 PROCEDURE — 36415 COLL VENOUS BLD VENIPUNCTURE: CPT

## 2023-10-13 PROCEDURE — 80053 COMPREHEN METABOLIC PANEL: CPT

## 2023-10-24 ENCOUNTER — OFFICE VISIT (OUTPATIENT)
Dept: PRIMARY CARE | Facility: CLINIC | Age: 68
End: 2023-10-24
Payer: COMMERCIAL

## 2023-10-24 VITALS
SYSTOLIC BLOOD PRESSURE: 133 MMHG | RESPIRATION RATE: 16 BRPM | DIASTOLIC BLOOD PRESSURE: 76 MMHG | TEMPERATURE: 97.8 F | HEIGHT: 58 IN | OXYGEN SATURATION: 96 % | WEIGHT: 116.4 LBS | BODY MASS INDEX: 24.43 KG/M2

## 2023-10-24 DIAGNOSIS — M06.9 RHEUMATOID ARTHRITIS INVOLVING MULTIPLE SITES, UNSPECIFIED WHETHER RHEUMATOID FACTOR PRESENT (MULTI): ICD-10-CM

## 2023-10-24 DIAGNOSIS — M41.9 KYPHOSCOLIOSIS: ICD-10-CM

## 2023-10-24 DIAGNOSIS — Z00.00 ROUTINE GENERAL MEDICAL EXAMINATION AT HEALTH CARE FACILITY: ICD-10-CM

## 2023-10-24 DIAGNOSIS — I48.0 PAF (PAROXYSMAL ATRIAL FIBRILLATION) (MULTI): Primary | ICD-10-CM

## 2023-10-24 PROBLEM — R59.1 LYMPHADENOPATHY: Status: RESOLVED | Noted: 2023-02-11 | Resolved: 2023-10-24

## 2023-10-24 PROBLEM — R53.1 WEAKNESS: Status: RESOLVED | Noted: 2023-10-10 | Resolved: 2023-10-24

## 2023-10-24 PROBLEM — R59.0 LYMPHADENOPATHY, POSTERIOR CERVICAL: Status: RESOLVED | Noted: 2023-09-07 | Resolved: 2023-10-24

## 2023-10-24 PROCEDURE — 3078F DIAST BP <80 MM HG: CPT | Performed by: INTERNAL MEDICINE

## 2023-10-24 PROCEDURE — 1160F RVW MEDS BY RX/DR IN RCRD: CPT | Performed by: INTERNAL MEDICINE

## 2023-10-24 PROCEDURE — 1159F MED LIST DOCD IN RCRD: CPT | Performed by: INTERNAL MEDICINE

## 2023-10-24 PROCEDURE — 3075F SYST BP GE 130 - 139MM HG: CPT | Performed by: INTERNAL MEDICINE

## 2023-10-24 PROCEDURE — 1036F TOBACCO NON-USER: CPT | Performed by: INTERNAL MEDICINE

## 2023-10-24 PROCEDURE — 99213 OFFICE O/P EST LOW 20 MIN: CPT | Performed by: INTERNAL MEDICINE

## 2023-10-24 PROCEDURE — 1126F AMNT PAIN NOTED NONE PRSNT: CPT | Performed by: INTERNAL MEDICINE

## 2023-10-24 ASSESSMENT — PAIN SCALES - GENERAL: PAINLEVEL: 0-NO PAIN

## 2023-10-24 NOTE — PROGRESS NOTES
"Subjective   Danielle Deshpande is a 68 y.o. female who presents for Follow-up (Labs ).    Patient started PT on 10.10.2023 but only had one visit  which was covering health history, has next apt on 11.7.2023  Bone density scheduled for  10.31.2023  Colonoscopy scheduled for 11.13.2023  Patient has an apt. Scheduled for a rheumatologist 11.28.2023 with Dr. Deneen Alonso  Doing well and has tests pending.  Labs reviewed.  NO complaints.        Review of Systems    Objective   /76   Temp 36.6 °C (97.8 °F)   Resp 16   Ht 1.473 m (4' 10\")   Wt 52.8 kg (116 lb 6.4 oz)   SpO2 96%   BMI 24.33 kg/m²       Physical Exam  Constitutional:       Appearance: Normal appearance.   HENT:      Head: Normocephalic.   Cardiovascular:      Rate and Rhythm: Normal rate and regular rhythm.   Pulmonary:      Effort: Pulmonary effort is normal.   Musculoskeletal:      Cervical back: Neck supple.   Skin:     General: Skin is warm and dry.   Psychiatric:         Mood and Affect: Mood normal.         Assessment/Plan   Problem List Items Addressed This Visit       Kyphoscoliosis    Rheumatoid arthritis (CMS/HCC)    PAF (paroxysmal atrial fibrillation) (CMS/HCC) - Primary     Other Visit Diagnoses       Routine general medical examination at health care facility              Encounter Diagnoses   Name Primary?    Rheumatoid arthritis involving multiple sites, unspecified whether rheumatoid factor present (CMS/HCC)     Kyphoscoliosis     Routine general medical examination at health care facility     PAF (paroxysmal atrial fibrillation) (CMS/HCC) Yes     West Pizano DO   "

## 2023-10-31 ENCOUNTER — HOSPITAL ENCOUNTER (OUTPATIENT)
Dept: RADIOLOGY | Facility: HOSPITAL | Age: 68
Discharge: HOME | End: 2023-10-31
Payer: COMMERCIAL

## 2023-10-31 DIAGNOSIS — Z78.0 ASYMPTOMATIC MENOPAUSAL STATE: ICD-10-CM

## 2023-10-31 PROCEDURE — 77080 DXA BONE DENSITY AXIAL: CPT | Performed by: STUDENT IN AN ORGANIZED HEALTH CARE EDUCATION/TRAINING PROGRAM

## 2023-10-31 PROCEDURE — 77080 DXA BONE DENSITY AXIAL: CPT

## 2023-11-07 ENCOUNTER — TREATMENT (OUTPATIENT)
Dept: PHYSICAL THERAPY | Facility: CLINIC | Age: 68
End: 2023-11-07
Payer: COMMERCIAL

## 2023-11-07 DIAGNOSIS — M41.9 KYPHOSCOLIOSIS: ICD-10-CM

## 2023-11-07 DIAGNOSIS — R53.1 WEAKNESS: ICD-10-CM

## 2023-11-07 DIAGNOSIS — M54.9 BILATERAL BACK PAIN, UNSPECIFIED BACK LOCATION, UNSPECIFIED CHRONICITY: ICD-10-CM

## 2023-11-07 DIAGNOSIS — M54.2 NECK PAIN: ICD-10-CM

## 2023-11-07 PROCEDURE — 97110 THERAPEUTIC EXERCISES: CPT | Mod: GP | Performed by: PHYSICAL THERAPIST

## 2023-11-07 ASSESSMENT — PAIN SCALES - GENERAL
PAINLEVEL_OUTOF10: 0 - NO PAIN
PAINLEVEL_OUTOF10: 0 - NO PAIN

## 2023-11-07 ASSESSMENT — PAIN - FUNCTIONAL ASSESSMENT: PAIN_FUNCTIONAL_ASSESSMENT: 0-10

## 2023-11-07 NOTE — PROGRESS NOTES
Physical Therapy Treatment    Patient Name: Danielle Deshpande  MRN: [id number]  Today's Date: 11/7/2023  Time Calculation  Start Time: 1531  Stop Time: 1609  Time Calculation (min): 38 min      Assessment:   ROM exercises completed to improve joint mobility.  Strengthening completed to improve joint stability.    Pt was able to complete exercises as requested.    She notes retro UBE causes slight (L) shoulder irritation that subsided with rest.  Pt notes quick fatigue during SL (L) shoulder ER that did not cause pain.  Following supine TA activities pt notes fatigue of the lower back muscles but denies pain.    HEP updated and pt verbalizes understanding of all pt education.    Pt exits the clinic with 0/10 pain.       Plan:  Continue with current PT POC and progress as tolerated.   Pt is doing well so will continue 1x/week at this time.  Pt will call back to modify current schedule.    Current Problem  1. Kyphoscoliosis  Follow Up In Physical Therapy      2. Neck pain  Follow Up In Physical Therapy      3. Weakness  Follow Up In Physical Therapy      4. Bilateral back pain, unspecified back location, unspecified chronicity  Follow Up In Physical Therapy          Subjective   Precautions  Significant PMHx (6 items):  1. Hx spinal fusion surgery 1980 to attempt to correct kyphoscoliosis    2. Allergic to adhesive from bandages    3. Mitral and tricuspid regurgitation, Felton-Parkinson-White syndrome/fast heartbeat, A-fib (all treated with medication/F/U with cardiologist)    4. Osteoporosis    5. BPPV causing intermittent dizziness (treated with medication/F/U with neurologist)    6. RA (F/U with rheumatology)  Pain  Pain Assessment: 0-10  Pain Score: 0 - No pain  Pain Location: Neck    She enters with no neck pain.  She reports that overall since PT IE her neck pain has been improving.  She notes that her granddaughter lovers her jumper and will sometimes start to jump in her arms which flares up the neck.    She is  "having no mid-lower back pain today.    She had no increased irritation following PT IE.      Objective   Treatments:  Visit 2 of 12 max  *Pt demonstrated quick fatigue at visit 2 so progress slowly please    Exercises completed (*indicates on HEP):  - UBE F/R, 3'/1'  - *(B) UT/LS stretches, 30\" x2 ea  - *SL (B) shoulder ER/ABD, x10 ea  - Supine TA activation practice completed  - *Supine TA march, x10  - *Supine TA hip ABD  ADD - Supine chin tucks  ADD - TB row  ADD - TB shoulder extension  ADD - TB (B) ER  ADD - TA walk out      OP EDUCATION:  11-7-23: Reviewed okay discomfort vs pain that requires modification.  Pt educated on DOMS.   Pt educated that stretching should be gentle and never cause/increase s/s  Reviewed basic anatomy of the core and importance of maintaining breathing during TA activities.  Discussed frequency of exercises and modifications PRN.    10-10-23: Pt educated on PT POC/benefits and okay discomfort vs pain that requires modification      Goals:  1. Pt will be (I) with HEP for carryover of gains from skilled PT.  2. Pt will report reduction in pain to 4/10 at worst for improved ability to complete ADLs and enjoyable activities.  3. Improved FAUSTO score to demonstrate an improvement in quality of life.  4. Improved TA activation to at least moderate in standing for improved spinal stability.  5. Improved (B) shoulder strength to at least 4+/5 for improved stability.  6. Improved (B) hip extension strength to at least 4+/5 for improved stability.  7. Pt will be able to reach into top kitchen cabinet with (L) UE with minimal to no pain.  8. Pt will be able to lift up to 30# at work/hold her granddaughter with minimal to no pain.  "

## 2023-11-08 ENCOUNTER — PREP FOR PROCEDURE (OUTPATIENT)
Dept: GASTROENTEROLOGY | Facility: HOSPITAL | Age: 68
End: 2023-11-08
Payer: COMMERCIAL

## 2023-11-09 ENCOUNTER — LAB (OUTPATIENT)
Dept: LAB | Facility: LAB | Age: 68
End: 2023-11-09
Payer: COMMERCIAL

## 2023-11-09 ENCOUNTER — TELEPHONE (OUTPATIENT)
Dept: UROLOGY | Facility: CLINIC | Age: 68
End: 2023-11-09
Payer: COMMERCIAL

## 2023-11-09 DIAGNOSIS — N39.46 MIXED INCONTINENCE URGE AND STRESS: Primary | ICD-10-CM

## 2023-11-09 DIAGNOSIS — N39.46 MIXED INCONTINENCE URGE AND STRESS: ICD-10-CM

## 2023-11-09 LAB
APPEARANCE UR: CLEAR
BACTERIA #/AREA URNS AUTO: ABNORMAL /HPF
BILIRUB UR STRIP.AUTO-MCNC: NEGATIVE MG/DL
COLOR UR: ABNORMAL
GLUCOSE UR STRIP.AUTO-MCNC: NEGATIVE MG/DL
HOLD SPECIMEN: NORMAL
KETONES UR STRIP.AUTO-MCNC: NEGATIVE MG/DL
LEUKOCYTE ESTERASE UR QL STRIP.AUTO: NEGATIVE
NITRITE UR QL STRIP.AUTO: NEGATIVE
PH UR STRIP.AUTO: 7 [PH]
PROT UR STRIP.AUTO-MCNC: NEGATIVE MG/DL
RBC # UR STRIP.AUTO: ABNORMAL /UL
RBC #/AREA URNS AUTO: >20 /HPF
SP GR UR STRIP.AUTO: 1.01
UROBILINOGEN UR STRIP.AUTO-MCNC: <2 MG/DL
WBC #/AREA URNS AUTO: ABNORMAL /HPF

## 2023-11-09 PROCEDURE — 81001 URINALYSIS AUTO W/SCOPE: CPT

## 2023-11-09 NOTE — TELEPHONE ENCOUNTER
Patient called and said she thinks she has a start of a UTI, Having frequent urination, back pain, fatigue, urination not normal,  NO FEVER she has a colonoscopy on Monday and wants to make sure she doesn't have an infection when they do the colonoscopy.  Please advise if you will order a UA/Culture    LOV with WILFRIDO 9/26/23

## 2023-11-13 ENCOUNTER — HOSPITAL ENCOUNTER (OUTPATIENT)
Dept: GASTROENTEROLOGY | Facility: HOSPITAL | Age: 68
Setting detail: OUTPATIENT SURGERY
Discharge: HOME | End: 2023-11-13
Payer: COMMERCIAL

## 2023-11-13 ENCOUNTER — ANESTHESIA EVENT (OUTPATIENT)
Dept: GASTROENTEROLOGY | Facility: HOSPITAL | Age: 68
End: 2023-11-13
Payer: COMMERCIAL

## 2023-11-13 ENCOUNTER — ANESTHESIA (OUTPATIENT)
Dept: GASTROENTEROLOGY | Facility: HOSPITAL | Age: 68
End: 2023-11-13
Payer: COMMERCIAL

## 2023-11-13 VITALS
WEIGHT: 114 LBS | HEART RATE: 83 BPM | HEIGHT: 59 IN | DIASTOLIC BLOOD PRESSURE: 83 MMHG | RESPIRATION RATE: 18 BRPM | BODY MASS INDEX: 22.98 KG/M2 | OXYGEN SATURATION: 98 % | SYSTOLIC BLOOD PRESSURE: 138 MMHG | TEMPERATURE: 98.4 F

## 2023-11-13 DIAGNOSIS — Z12.12 SCREENING FOR COLORECTAL CANCER: Primary | ICD-10-CM

## 2023-11-13 DIAGNOSIS — Z12.11 SCREENING FOR COLORECTAL CANCER: Primary | ICD-10-CM

## 2023-11-13 PROCEDURE — 3700000001 HC GENERAL ANESTHESIA TIME - INITIAL BASE CHARGE

## 2023-11-13 PROCEDURE — 7100000010 HC PHASE TWO TIME - EACH INCREMENTAL 1 MINUTE

## 2023-11-13 PROCEDURE — 88305 TISSUE EXAM BY PATHOLOGIST: CPT | Mod: TC,STJLAB | Performed by: INTERNAL MEDICINE

## 2023-11-13 PROCEDURE — 45385 COLONOSCOPY W/LESION REMOVAL: CPT | Performed by: INTERNAL MEDICINE

## 2023-11-13 PROCEDURE — 2500000004 HC RX 250 GENERAL PHARMACY W/ HCPCS (ALT 636 FOR OP/ED): Performed by: ANESTHESIOLOGIST ASSISTANT

## 2023-11-13 PROCEDURE — 3700000002 HC GENERAL ANESTHESIA TIME - EACH INCREMENTAL 1 MINUTE

## 2023-11-13 PROCEDURE — 88305 TISSUE EXAM BY PATHOLOGIST: CPT | Mod: TC,SUR,STJLAB | Performed by: INTERNAL MEDICINE

## 2023-11-13 PROCEDURE — 7100000009 HC PHASE TWO TIME - INITIAL BASE CHARGE

## 2023-11-13 PROCEDURE — 88305 TISSUE EXAM BY PATHOLOGIST: CPT | Performed by: STUDENT IN AN ORGANIZED HEALTH CARE EDUCATION/TRAINING PROGRAM

## 2023-11-13 PROCEDURE — 2500000005 HC RX 250 GENERAL PHARMACY W/O HCPCS: Performed by: ANESTHESIOLOGIST ASSISTANT

## 2023-11-13 PROCEDURE — A45385 PR COLONOSCOPY,REMV LESN,SNARE: Performed by: ANESTHESIOLOGIST ASSISTANT

## 2023-11-13 PROCEDURE — A45385 PR COLONOSCOPY,REMV LESN,SNARE: Performed by: STUDENT IN AN ORGANIZED HEALTH CARE EDUCATION/TRAINING PROGRAM

## 2023-11-13 RX ORDER — LIDOCAINE HYDROCHLORIDE 20 MG/ML
INJECTION, SOLUTION EPIDURAL; INFILTRATION; INTRACAUDAL; PERINEURAL AS NEEDED
Status: DISCONTINUED | OUTPATIENT
Start: 2023-11-13 | End: 2023-11-13

## 2023-11-13 RX ORDER — ONDANSETRON HYDROCHLORIDE 2 MG/ML
INJECTION, SOLUTION INTRAVENOUS AS NEEDED
Status: DISCONTINUED | OUTPATIENT
Start: 2023-11-13 | End: 2023-11-13

## 2023-11-13 RX ORDER — PROPOFOL 10 MG/ML
INJECTION, EMULSION INTRAVENOUS CONTINUOUS PRN
Status: DISCONTINUED | OUTPATIENT
Start: 2023-11-13 | End: 2023-11-13

## 2023-11-13 RX ORDER — PROPOFOL 10 MG/ML
INJECTION, EMULSION INTRAVENOUS AS NEEDED
Status: DISCONTINUED | OUTPATIENT
Start: 2023-11-13 | End: 2023-11-13

## 2023-11-13 RX ORDER — PHENYLEPHRINE HCL IN 0.9% NACL 1 MG/10 ML
SYRINGE (ML) INTRAVENOUS AS NEEDED
Status: DISCONTINUED | OUTPATIENT
Start: 2023-11-13 | End: 2023-11-13

## 2023-11-13 RX ORDER — SODIUM CHLORIDE 9 MG/ML
20 INJECTION, SOLUTION INTRAVENOUS CONTINUOUS
Status: DISCONTINUED | OUTPATIENT
Start: 2023-11-13 | End: 2023-11-14 | Stop reason: HOSPADM

## 2023-11-13 RX ADMIN — PROPOFOL 40 MG: 10 INJECTION, EMULSION INTRAVENOUS at 14:29

## 2023-11-13 RX ADMIN — Medication 200 MCG: at 14:49

## 2023-11-13 RX ADMIN — PROPOFOL 20 MG: 10 INJECTION, EMULSION INTRAVENOUS at 14:30

## 2023-11-13 RX ADMIN — PROPOFOL 20 MG: 10 INJECTION, EMULSION INTRAVENOUS at 14:32

## 2023-11-13 RX ADMIN — PROPOFOL 180 MCG/KG/MIN: 10 INJECTION, EMULSION INTRAVENOUS at 14:29

## 2023-11-13 RX ADMIN — ONDANSETRON 4 MG: 2 INJECTION INTRAMUSCULAR; INTRAVENOUS at 14:34

## 2023-11-13 RX ADMIN — Medication 150 MCG: at 14:33

## 2023-11-13 RX ADMIN — SODIUM CHLORIDE, SODIUM LACTATE, POTASSIUM CHLORIDE, AND CALCIUM CHLORIDE: 600; 310; 30; 20 INJECTION, SOLUTION INTRAVENOUS at 14:00

## 2023-11-13 RX ADMIN — LIDOCAINE HYDROCHLORIDE 50 MG: 20 INJECTION, SOLUTION EPIDURAL; INFILTRATION; INTRACAUDAL; PERINEURAL at 14:29

## 2023-11-13 RX ADMIN — Medication 150 MCG: at 14:45

## 2023-11-13 SDOH — HEALTH STABILITY: MENTAL HEALTH: CURRENT SMOKER: 0

## 2023-11-13 ASSESSMENT — COLUMBIA-SUICIDE SEVERITY RATING SCALE - C-SSRS
6. HAVE YOU EVER DONE ANYTHING, STARTED TO DO ANYTHING, OR PREPARED TO DO ANYTHING TO END YOUR LIFE?: NO
1. IN THE PAST MONTH, HAVE YOU WISHED YOU WERE DEAD OR WISHED YOU COULD GO TO SLEEP AND NOT WAKE UP?: NO
2. HAVE YOU ACTUALLY HAD ANY THOUGHTS OF KILLING YOURSELF?: NO

## 2023-11-13 ASSESSMENT — PAIN - FUNCTIONAL ASSESSMENT
PAIN_FUNCTIONAL_ASSESSMENT: 0-10

## 2023-11-13 ASSESSMENT — PAIN SCALES - GENERAL
PAINLEVEL_OUTOF10: 0 - NO PAIN

## 2023-11-13 NOTE — DISCHARGE INSTRUCTIONS
Patient Instructions Post Procedure      The anesthetics, sedatives or narcotics which were given to you today will be acting in your body for the next 24 hours, so you might feel a little sleepy or groggy.  This feeling should slowly wear off. Carefully read and follow the instructions.     You received sedation today:  - Do not drive or operate any machinery or power tools of any kind.   - No alcoholic beverages today, not even beer or wine.  - Do not make any important decisions or sign any legal documents.  - No over the counter medications that contain alcohol or that may cause drowsiness.    While it is common to experience mild to moderate abdominal distention, gas, or belching after your procedure, if any of these symptoms occur following discharge from the GI Lab or within one week of having your procedure, call the [facility] to be advised whether a visit to your nearest Urgent Care or Emergency Department is indicated.  Take this paper with you if you go.   - If you develop an allergic reaction to the medications that were given during your procedure such as difficulty breathing, rash, hives, severe nausea, vomiting or lightheadedness.  - If you experience chest pain, shortness of breath, severe abdominal pain, fevers and chills.  -If you develop signs and symptoms of bleeding such as blood in your spit, if your stools turn black, tarry, or bloody  - If you have not urinated within 8 hours following your procedure.  - If your IV site becomes painful, red, inflamed, or looks infected.    If you received a biopsy/polypectomy/sphincterotomy the following instructions apply below:  _x_ Do not use non-steroidal medications or anti-coagulants for one week following your procedure. (Examples of these types of medications are: Advil, Arthrotec, Aleve, Coumadin, Ecotrin, Heparin, Ibuprofen, Indocin, Motrin, Naprosyn, Nuprin, Plavix, Vioxx, and Voltarin, or their generic forms.  This list is not  all-inclusive.  Check with your physician or pharmacist before resuming medications.)   _x_ Eat a soft diet today.  Avoid foods that are poorly digested for the next 24 hours.  These foods would include: nuts, beans, lettuce, red meats, and fried foods. Start with liquids and advance your diet as tolerated, gradually work up to eating solids.   __ You can restart your ASA tomorrow  __ You can resume your anticoagulation therapy -       Your physician recommends the additional following instructions:    -You have a contact number available for emergencies. The signs and symptoms of potential delayed complications were discussed with you. You may return to normal activities tomorrow.  -Resume your previous diet or other if specified.  -Continue your present medications.   -We are waiting for your pathology results, if applicable.  -The findings and recommendations have been discussed with you and/or family.  - Please see Medication Reconciliation Form for new medication/medications prescribed.     If you experience any problems or have any questions following discharge from the GI Lab,   In the event of an emergency please go to the closest Emergency Department or call Dr. Ng at [phone]

## 2023-11-13 NOTE — ANESTHESIA POSTPROCEDURE EVALUATION
Patient: Danielle Deshpande    Procedure Summary       Date: 11/13/23 Room / Location: [facility] [place]    Anesthesia Start: 1420 Anesthesia Stop: 1455    Procedure: COLONOSCOPY Diagnosis:       Screening for colorectal cancer      Screening for colorectal cancer    Scheduled Providers: Sera VINSON MD Responsible Provider: Hernandez Mckeon DO    Anesthesia Type: MAC ASA Status: 3            Anesthesia Type: MAC    Vitals Value Taken Time   /61 11/13/23 1456   Temp 36.9 °C (98.4 °F) 11/13/23 1456   Pulse 90 11/13/23 1456   Resp 12 11/13/23 1456   SpO2 99 % 11/13/23 1456       Anesthesia Post Evaluation    Patient location during evaluation: bedside  Level of consciousness: awake and alert  Pain management: adequate  Airway patency: patent  Cardiovascular status: acceptable  Respiratory status: acceptable  Hydration status: acceptable        No notable events documented.

## 2023-11-13 NOTE — H&P
Outpatient Hospital Procedure    Patient Profile-Procedures  Initial Info  Patient Demographics  Name Danielle Deshpande  Date of Birth 1955  MRN [id number]  Address   [address] [id number] [facility] [id number]    Primary Phone Number [phone]  Secondary Phone Number    PCP [place]    Procedures   Colonoscopy      Indication:  Surveillance - remote polyps  - none in 2015    Primary contact name and number   Extended Emergency Contact Information  Primary Emergency Contact: Adalberto Deshpande  Home Phone: [phone]  Relation: Spouse    General Health  Weight   Vitals:    11/13/23 1413   Weight: 51.7 kg (114 lb)     BMI Body mass index is 23.03 kg/m².    Allergies  Allergies   Allergen Reactions    Antihistamine [Diphenhydramine Hcl] Other     Antihistamine TABS; Confusion     Antihistamine-1 Other     confusion    Levofloxacin Other     confusion    Procaine Other     Palpatations       Past Medical History   Past Medical History:   Diagnosis Date    Acute recurrent sinusitis, unspecified 02/04/2022    Acute recurrent sinusitis, unspecified location    Arthritis 2010    Colon polyp 1986    Heart disease     WPW    Hypertension 1993    Other signs and symptoms in breast 06/11/2014    Inversion of nipple    Personal history of other diseases of the circulatory system 04/02/2014    History of hypertension    Personal history of other diseases of the female genital tract 08/14/2020    History of postmenopausal bleeding    Personal history of other diseases of the female genital tract 01/18/2022    History of vaginitis    Personal history of other diseases of the musculoskeletal system and connective tissue 04/02/2014    Personal history of scoliosis    Personal history of other specified conditions 06/11/2014    History of lump of right breast    Personal history of other specified conditions 09/01/2020    History of fatigue    PONV (postoperative nausea and  vomiting) 1980    Unspecified hydronephrosis 09/01/2020    Hydronephrosis of right kidney       Provider assessment  Diagnosis  Medication Reviewed - yes  Prior to Admission medications    Medication Sig Start Date End Date Taking? Authorizing Provider   amitriptyline (Elavil) 10 mg tablet Take 1.5 tablets (15 mg) by mouth once daily at bedtime. 9/7/23 9/6/24 Yes [place] P[place]ano DO   biotin 5,000 mcg tablet,disintegrating Take 1 tablet daily.   Yes Historical Provider, MD   CALCIUM ORAL Take 1 tablet daily   Yes Historical Provider, MD   cholecalciferol (Vitamin D-3) 50 mcg (2,000 unit) capsule Take 1 capsule daily   Yes Historical Provider, MD   cranberry extract 200 mg capsule TAKE 1 CAPSULE Daily 4/2/14  Yes Historical Provider, MD   docusate sodium (Colace) 100 mg capsule TAKE 1 CAPSULE Daily   Yes Historical Provider, MD   estradiol (Estrace) 0.01 % (0.1 mg/gram) vaginal cream USE TWICE WEEKLY AS DIRECTED. 6/9/20  Yes Historical Provider, MD   folic acid (Folvite) 1 mg tablet TAKE 1 TABLET DAILY.   Yes Historical Provider, MD   glucosamine/chondr bautista A sod (OSTEO BI-FLEX ORAL) Take 1 tablet twice daily.   Yes Historical Provider, MD   hydroxychloroquine (Plaquenil) 200 mg tablet TAKE 1 TABLET TWICE DAILY WITH FOOD. 9/3/21  Yes Historical Provider, MD   leflunomide (Arava) 10 mg tablet TAKE 1 TABLET DAILY AS DIRECTED.   Yes Historical Provider, MD   lisinopril 10 mg tablet Take 1 tablet (10 mg) by mouth once daily. 9/7/23 9/6/24 Yes [place] P[place] DO   MAGNESIUM ORAL Take 1 tablet by mouth 1 (one) time each day.   Yes Historical Provider, MD   metoprolol succinate XL (Toprol-XL) 50 mg 24 hr tablet Take 1 tablet (50 mg) by mouth once daily. Do not crush or chew. 9/7/23 9/6/24 Yes [place] P[place] DO   TURMERIC ORAL Take 1 capsule twice daily.   Yes Historical Provider, MD       This is my H&P    Physical Exam  Physical Exam  Constitutional:       Comments: Awake   HENT:      Head: Normocephalic.    Cardiovascular:      Rate and Rhythm: Normal rate and regular rhythm.   Pulmonary:      Effort: Pulmonary effort is normal.      Breath sounds: Normal breath sounds.   Abdominal:      General: Bowel sounds are normal.      Palpations: Abdomen is soft.   Neurological:      Mental Status: She is alert.   Psychiatric:         Mood and Affect: Mood normal.           Oropharyngeal Classification II (hard and soft palate, upper portion of tonsils anduvula visible)  ASA PS Classification 3  Sedation Plan Deep  Procedure Plan - pre-procedural (re)assesment completed by physician:  discharge/transfer patient when discharge criteria met    Sera V MD Hernandez  11/13/2023 2:26 PM

## 2023-11-13 NOTE — ANESTHESIA PREPROCEDURE EVALUATION
Patient: Danielle Deshpande    Procedure Information       Date/Time: 11/13/23 1335    Scheduled providers: Sera VINSON MD    Procedure: COLONOSCOPY    Location: [facility]          There were no vitals filed for this visit.    Past Surgical History:   Procedure Laterality Date   • BACK SURGERY  04/02/2014    Back Surgery   • BREAST BIOPSY  06/11/2014    Biopsy Breast Open   • COLONOSCOPY  04/02/2014    Colonoscopy (Fiberoptic)   • OTHER SURGICAL HISTORY  01/18/2022    Percutaneous nephrolithotomy   • OTHER SURGICAL HISTORY  01/18/2022    Renal lithotripsy     Past Medical History:   Diagnosis Date   • Acute recurrent sinusitis, unspecified 02/04/2022    Acute recurrent sinusitis, unspecified location   • Other signs and symptoms in breast 06/11/2014    Inversion of nipple   • Personal history of other diseases of the circulatory system 04/02/2014    History of hypertension   • Personal history of other diseases of the female genital tract 08/14/2020    History of postmenopausal bleeding   • Personal history of other diseases of the female genital tract 01/18/2022    History of vaginitis   • Personal history of other diseases of the musculoskeletal system and connective tissue 04/02/2014    Personal history of scoliosis   • Personal history of other specified conditions 06/11/2014    History of lump of right breast   • Personal history of other specified conditions 09/01/2020    History of fatigue   • Unspecified hydronephrosis 09/01/2020    Hydronephrosis of right kidney       Current Outpatient Medications:   •  amitriptyline (Elavil) 10 mg tablet, Take 1.5 tablets (15 mg) by mouth once daily at bedtime., Disp: 135 tablet, Rfl: 3  •  biotin 5,000 mcg tablet,disintegrating, Take 1 tablet daily., Disp: , Rfl:   •  CALCIUM ORAL, Take 1 tablet daily, Disp: , Rfl:   •  cholecalciferol (Vitamin D-3) 50 mcg (2,000 unit) capsule, Take 1 capsule daily, Disp: , Rfl:   •  cranberry extract 200 mg capsule,  TAKE 1 CAPSULE Daily, Disp: , Rfl:   •  docusate sodium (Colace) 100 mg capsule, TAKE 1 CAPSULE Daily, Disp: , Rfl:   •  estradiol (Estrace) 0.01 % (0.1 mg/gram) vaginal cream, USE TWICE WEEKLY AS DIRECTED., Disp: , Rfl:   •  folic acid (Folvite) 1 mg tablet, TAKE 1 TABLET DAILY., Disp: , Rfl:   •  glucosamine/chondr bautista A sod (OSTEO BI-FLEX ORAL), Take 1 tablet twice daily., Disp: , Rfl:   •  hydroxychloroquine (Plaquenil) 200 mg tablet, TAKE 1 TABLET TWICE DAILY WITH FOOD., Disp: , Rfl:   •  leflunomide (Arava) 10 mg tablet, TAKE 1 TABLET DAILY AS DIRECTED., Disp: , Rfl:   •  lisinopril 10 mg tablet, Take 1 tablet (10 mg) by mouth once daily., Disp: 90 tablet, Rfl: 3  •  MAGNESIUM ORAL, Take 1 tablet by mouth 1 (one) time each day., Disp: , Rfl:   •  metoprolol succinate XL (Toprol-XL) 50 mg 24 hr tablet, Take 1 tablet (50 mg) by mouth once daily. Do not crush or chew., Disp: 90 tablet, Rfl: 3  •  TURMERIC ORAL, Take 1 capsule twice daily., Disp: , Rfl:   Prior to Admission medications    Medication Sig Start Date End Date Taking? Authorizing Provider   amitriptyline (Elavil) 10 mg tablet Take 1.5 tablets (15 mg) by mouth once daily at bedtime. 9/7/23 9/6/24  [place], DO   biotin 5,000 mcg tablet,disintegrating Take 1 tablet daily.    Historical Provider, MD   CALCIUM ORAL Take 1 tablet daily    Historical Provider, MD   cholecalciferol (Vitamin D-3) 50 mcg (2,000 unit) capsule Take 1 capsule daily    Historical Provider, MD   cranberry extract 200 mg capsule TAKE 1 CAPSULE Daily 4/2/14   Historical Provider, MD   docusate sodium (Colace) 100 mg capsule TAKE 1 CAPSULE Daily    Historical Provider, MD   estradiol (Estrace) 0.01 % (0.1 mg/gram) vaginal cream USE TWICE WEEKLY AS DIRECTED. 6/9/20   Historical Provider, MD   folic acid (Folvite) 1 mg tablet TAKE 1 TABLET DAILY.    Historical Provider, MD   glucosamine/chondr bautista A sod (OSTEO BI-FLEX ORAL) Take 1 tablet twice daily.    Historical Provider, MD    hydroxychloroquine (Plaquenil) 200 mg tablet TAKE 1 TABLET TWICE DAILY WITH FOOD. 9/3/21   Historical Provider, MD   leflunomide (Arava) 10 mg tablet TAKE 1 TABLET DAILY AS DIRECTED.    Historical Provider, MD   lisinopril 10 mg tablet Take 1 tablet (10 mg) by mouth once daily. 9/7/23 9/6/24  [place] P[place]ano DO   MAGNESIUM ORAL Take 1 tablet by mouth 1 (one) time each day.    Historical Provider, MD   metoprolol succinate XL (Toprol-XL) 50 mg 24 hr tablet Take 1 tablet (50 mg) by mouth once daily. Do not crush or chew. 9/7/23 9/6/24  [place] Pizano DO   TURMERIC ORAL Take 1 capsule twice daily.    Historical Provider, MD     Allergies   Allergen Reactions   • Antihistamine [Diphenhydramine Hcl] Other     Antihistamine TABS; Confusion    • Antihistamine-1 Unknown   • Levofloxacin Other     confusion   • Procaine Other     Palpatations     Social History     Tobacco Use   • Smoking status: Never   • Smokeless tobacco: Never   Substance Use Topics   • Alcohol use: Not Currently         Chemistry    Lab Results   Component Value Date/Time     10/13/2023 1120    K 3.9 10/13/2023 1120     10/13/2023 1120    CO2 31 10/13/2023 1120    BUN 18 10/13/2023 1120    CREATININE 0.82 10/13/2023 1120    Lab Results   Component Value Date/Time    CALCIUM 9.0 10/13/2023 1120    ALKPHOS 74 10/13/2023 1120    AST 23 10/13/2023 1120    ALT 17 10/13/2023 1120    BILITOT 0.7 10/13/2023 1120          Lab Results   Component Value Date/Time    WBC 4.4 08/30/2023 0954    HGB 14.2 08/30/2023 0954    HCT 42.4 08/30/2023 0954     08/30/2023 0954     Lab Results   Component Value Date/Time    PROTIME 11.4 01/10/2020 0907    INR 1.0 01/10/2020 0907     Encounter Date: 09/07/23   ECG 12 lead (Clinic Performed)    Narrative    NSR with short NC   Possible Left Atrial Enlargement.  Inferior wll abnormality consistent with old injury.  When compared to EKG 8/24/22, No acute changes.     No results found for this or any  previous visit from the past 1095 days.   Relevant Problems   Anesthesia   (+) Dental caries extending into dentin      Cardiovascular   (+) Arrhythmia   (+) Benign essential hypertension   (+) Mitral regurgitation   (+) PAF (paroxysmal atrial fibrillation) (CMS/HCC)   (+) Tricuspid regurgitation   (+) Felton-Parkinson-White (WPW) syndrome      /Renal   (+) Nephrolithiasis      Musculoskeletal   (+) Kyphoscoliosis   (+) Localized osteoarthrosis   (+) Rheumatoid arthritis (CMS/HCC)   (+) Spinal stenosis      Eyes, Ears, Nose, and Throat   (+) Sensorineural hearing loss, bilateral      Infectious Disease   (+) Dental caries extending into dentin      Other   (+) Rheumatoid arthritis (CMS/HCC)       Clinical information reviewed:                   NPO Detail:  No data recorded     Physical Exam    Airway  Mallampati: II  TM distance: >3 FB  Neck ROM: full     Cardiovascular   Rhythm: irregular     Dental - normal exam     Pulmonary - normal exam     Abdominal - normal exam           Anesthesia Plan    ASA 3     MAC     The patient is not a current smoker.  Patient was previously instructed to abstain from smoking on day of procedure.  Patient did not smoke on day of procedure.  Education provided regarding risk of obstructive sleep apnea.  intravenous induction   Anesthetic plan and risks discussed with patient.  Use of blood products discussed with patient who.

## 2023-11-15 ENCOUNTER — APPOINTMENT (OUTPATIENT)
Dept: PHYSICAL THERAPY | Facility: CLINIC | Age: 68
End: 2023-11-15
Payer: COMMERCIAL

## 2023-11-17 ENCOUNTER — TREATMENT (OUTPATIENT)
Dept: PHYSICAL THERAPY | Facility: CLINIC | Age: 68
End: 2023-11-17
Payer: COMMERCIAL

## 2023-11-17 DIAGNOSIS — M54.2 NECK PAIN: ICD-10-CM

## 2023-11-17 DIAGNOSIS — R53.1 WEAKNESS: ICD-10-CM

## 2023-11-17 DIAGNOSIS — M41.9 KYPHOSCOLIOSIS: ICD-10-CM

## 2023-11-17 DIAGNOSIS — M54.9 BILATERAL BACK PAIN, UNSPECIFIED BACK LOCATION, UNSPECIFIED CHRONICITY: ICD-10-CM

## 2023-11-17 PROCEDURE — 97110 THERAPEUTIC EXERCISES: CPT | Mod: GP,CQ

## 2023-11-17 NOTE — PROGRESS NOTES
"Physical Therapy Treatment    Patient Name: Danielle Deshpande  MRN: [id number]  Today's Date: 11/17/2023  Time Calculation  Start Time: 1500  Stop Time: 1544  Time Calculation (min): 44 min      Assessment:   ROM exercises completed to improve joint mobility.  Strengthening completed to improve joint stability.    Pt was able to complete exercises as requested.    Pt. Tolerates exercises well today and is making progress towards goals.  Pt. Feels exercises have been beneficial and has been compliant with home program.    Plan:  Continue with current PT POC and progress as tolerated.       Current Problem  1. Kyphoscoliosis  Follow Up In Physical Therapy      2. Neck pain  Follow Up In Physical Therapy      3. Weakness  Follow Up In Physical Therapy      4. Bilateral back pain, unspecified back location, unspecified chronicity  Follow Up In Physical Therapy          Subjective   Precautions  Significant PMHx (6 items):  1. Hx spinal fusion surgery 1980 to attempt to correct kyphoscoliosis    2. Allergic to adhesive from bandages    3. Mitral and tricuspid regurgitation, Felton-Parkinson-White syndrome/fast heartbeat, A-fib (all treated with medication/F/U with cardiologist)    4. Osteoporosis    5. BPPV causing intermittent dizziness (treated with medication/F/U with neurologist)    6. RA (F/U with rheumatology)    Pain  2/10 left upper trap.     Pt. Reports she may have overstretched last night and having a slight increase in pain in the area of left upper trap and rates it 2/10.  Pt. Overall feels improved though.  Pt. States feeling better when doing exercises.    Objective   Treatments:  Visit 3 of 12 max  *Pt demonstrated quick fatigue at visit 2 so progress slowly please  Therapeutic Exercise (62588): 44 minutes  Exercises completed (*indicates on HEP):  - UBE x 5 minutes forward  - *(B) UT/LS stretches, 30\" x2 ea  - *SL (B) shoulder ER/ABD, x10 ea  - Supine TA activation practice completed  - *Supine TA march, " x10  - *Supine TA hip ABD x 10 reps  - *Supine chin tucks x 10 reps and added to HEP  ADD - TB row  ADD - TB shoulder extension  ADD - TB (B) ER  ADD - TA walk out      OP EDUCATION:  11-7-23: Reviewed okay discomfort vs pain that requires modification.  Pt educated on DOMS.   Pt educated that stretching should be gentle and never cause/increase s/s  Reviewed basic anatomy of the core and importance of maintaining breathing during TA activities.  Discussed frequency of exercises and modifications PRN.    10-10-23: Pt educated on PT POC/benefits and okay discomfort vs pain that requires modification      Goals:  1. Pt will be (I) with HEP for carryover of gains from skilled PT.  2. Pt will report reduction in pain to 4/10 at worst for improved ability to complete ADLs and enjoyable activities.  3. Improved FAUSTO score to demonstrate an improvement in quality of life.  4. Improved TA activation to at least moderate in standing for improved spinal stability.  5. Improved (B) shoulder strength to at least 4+/5 for improved stability.  6. Improved (B) hip extension strength to at least 4+/5 for improved stability.  7. Pt will be able to reach into top kitchen cabinet with (L) UE with minimal to no pain.  8. Pt will be able to lift up to 30# at work/hold her granddaughter with minimal to no pain.

## 2023-11-22 LAB
LABORATORY COMMENT REPORT: NORMAL
PATH REPORT.FINAL DX SPEC: NORMAL
PATH REPORT.GROSS SPEC: NORMAL
PATH REPORT.RELEVANT HX SPEC: NORMAL
PATH REPORT.TOTAL CANCER: NORMAL

## 2023-11-28 NOTE — PROGRESS NOTES
Physical Therapy Treatment    Patient Name: Danielle Deshpande  MRN: [id number]  Today's Date: 12/5/2023  Time Calculation  Start Time: 1533  Stop Time: 1611  Time Calculation (min): 38 min      Assessment:   ROM exercises completed to improve joint mobility.  Strengthening completed to improve joint stability.    Pt was able to complete exercises as requested.    She notes some back stiffness during the bridge that decreased with reps but denies pain.    HEP updated and pt verbalizes understanding of all pt education.    Pt exits the clinic with no pain but muscle fatigue as expected.      Plan:  Continue with current PT POC and progress as tolerated.       Current Problem  1. Neck pain        2. Bilateral back pain, unspecified back location, unspecified chronicity        3. Weakness            Subjective   Precautions  1. Hx spinal fusion surgery 1980 to attempt to correct kyphoscoliosis    2. Allergic to adhesive from bandages    3. Mitral and tricuspid regurgitation, Felton-Parkinson-White syndrome/fast heartbeat, A-fib (all treated with medication/F/U with cardiologist)    4. Osteoporosis    5. BPPV causing intermittent dizziness (treated with medication/F/U with neurologist)    6. RA (F/U with rheumatology)    Pain  Pain Assessment: 0-10  Pain Score: 0 - No pain  Pain Location: Neck    Pt states she has been very busy recently so she has not been able to complete her HEP as frequently.  She has no questions/concerns over current HEP but notes some days she can tell she worked out.  She has been able to complete her HEP about 3x/weeks.    Since last PT session she has not had any pain.    One day at work she was very busy, lifting/bagging groceries, and had some discomfort/DOMS on her (L) side but not true pain.    She enters with no pain today.      Objective   Treatments:  Visit 4 of 12 max  *Pt demonstrated quick fatigue at visit 2 so progress slowly please    Exercises completed (*indicates on HEP):  - WALLACE MAGAÑA  "5'  - *(B) UT/LS stretches, 30\" x2 ea  - *Supine TA march, x10  - *TA Bridge, 5 x2  - *Supine TA hip ABD, yellow TB, x10  - *TB row, yellow, x10  ADD - TB anti-rotation  ADD - Quadruped alternating LE lift    Not tested:  - *Supine chin tucks x 10 reps  - Supine TA activation practice completed  - *SL (B) shoulder ER/ABD, x10 ea      OP EDUCATION:  12-5-23: Reviewed okay discomfort vs pain that requires modification.  Reviewed that stretching should be gentle and never increase s/s  Reviewed DOMS and modifying exercises PRN for s/s    11-7-23: Reviewed okay discomfort vs pain that requires modification.  Pt educated on DOMS.   Pt educated that stretching should be gentle and never cause/increase s/s  Reviewed basic anatomy of the core and importance of maintaining breathing during TA activities.  Discussed frequency of exercises and modifications PRN.    10-10-23: Pt educated on PT POC/benefits and okay discomfort vs pain that requires modification      Goals:  1. Pt will be (I) with HEP for carryover of gains from skilled PT.  2. Pt will report reduction in pain to 4/10 at worst for improved ability to complete ADLs and enjoyable activities.  3. Improved FAUSTO score to demonstrate an improvement in quality of life.  4. Improved TA activation to at least moderate in standing for improved spinal stability.  5. Improved (B) shoulder strength to at least 4+/5 for improved stability.  6. Improved (B) hip extension strength to at least 4+/5 for improved stability.  7. Pt will be able to reach into top kitchen cabinet with (L) UE with minimal to no pain.  8. Pt will be able to lift up to 30# at work/hold her granddaughter with minimal to no pain.  "

## 2023-12-05 ENCOUNTER — TREATMENT (OUTPATIENT)
Dept: PHYSICAL THERAPY | Facility: CLINIC | Age: 68
End: 2023-12-05
Payer: COMMERCIAL

## 2023-12-05 ENCOUNTER — TELEPHONE (OUTPATIENT)
Dept: PRIMARY CARE | Facility: CLINIC | Age: 68
End: 2023-12-05
Payer: COMMERCIAL

## 2023-12-05 DIAGNOSIS — M54.2 NECK PAIN: Primary | ICD-10-CM

## 2023-12-05 DIAGNOSIS — R53.1 WEAKNESS: ICD-10-CM

## 2023-12-05 DIAGNOSIS — M54.9 BILATERAL BACK PAIN, UNSPECIFIED BACK LOCATION, UNSPECIFIED CHRONICITY: ICD-10-CM

## 2023-12-05 PROCEDURE — 97110 THERAPEUTIC EXERCISES: CPT | Mod: GP | Performed by: PHYSICAL THERAPIST

## 2023-12-05 ASSESSMENT — PAIN SCALES - GENERAL
PAINLEVEL_OUTOF10: 0 - NO PAIN
PAINLEVEL_OUTOF10: 0 - NO PAIN

## 2023-12-05 ASSESSMENT — PAIN - FUNCTIONAL ASSESSMENT: PAIN_FUNCTIONAL_ASSESSMENT: 0-10

## 2023-12-05 NOTE — PROGRESS NOTES
"Physical Therapy Treatment    Patient Name: Danielle Deshpande  MRN: [id number]  Today's Date: 12/5/2023           Pt will likely want a hold after today's visit              Assessment:   ROM exercises completed to improve joint mobility.  Strengthening completed to improve joint stability.    Pt was able to complete exercises as requested.      HEP updated and pt verbalizes understanding of all pt education.    Pt exits the clinic with no pain but muscle fatigue as expected.    Pt is doing well and d/t a busier schedule around the holidays pt will be placed on hold.      Plan:  Hold for 30 days.      Current Problem  No diagnosis found.    Subjective   Precautions  1. Hx spinal fusion surgery 1980 to attempt to correct kyphoscoliosis    2. Allergic to adhesive from bandages    3. Mitral and tricuspid regurgitation, Felton-Parkinson-White syndrome/fast heartbeat, A-fib (all treated with medication/F/U with cardiologist)    4. Osteoporosis    5. BPPV causing intermittent dizziness (treated with medication/F/U with neurologist)    6. RA (F/U with rheumatology)    Pain             Objective   Treatments:  Visit 5 of 12 max  *Pt demonstrated quick fatigue at visit 2 so progress slowly please    Exercises completed (*indicates on HEP):  - UBE, F, 5'  - *(B) UT/LS stretches, 30\" x2 ea  - *Supine TA march, x10  - *TA Bridge, 5 x2  - *Supine TA hip ABD, yellow TB, x10  - *TB row, yellow, x10  ADD - TB anti-rotation  ADD - Quadruped alternating LE lift    Not tested:  - *Supine chin tucks x 10 reps  - Supine TA activation practice completed  - *SL (B) shoulder ER/ABD, x10 ea      OP EDUCATION:  12-5-23: Reviewed okay discomfort vs pain that requires modification.  Reviewed that stretching should be gentle and never increase s/s  Reviewed DOMS and modifying exercises PRN for s/s    11-7-23: Reviewed okay discomfort vs pain that requires modification.  Pt educated on DOMS.   Pt educated that stretching should be gentle and never " cause/increase s/s  Reviewed basic anatomy of the core and importance of maintaining breathing during TA activities.  Discussed frequency of exercises and modifications PRN.    10-10-23: Pt educated on PT POC/benefits and okay discomfort vs pain that requires modification      Goals:  1. Pt will be (I) with HEP for carryover of gains from skilled PT.  2. Pt will report reduction in pain to 4/10 at worst for improved ability to complete ADLs and enjoyable activities.  3. Improved FAUSTO score to demonstrate an improvement in quality of life.  4. Improved TA activation to at least moderate in standing for improved spinal stability.  5. Improved (B) shoulder strength to at least 4+/5 for improved stability.  6. Improved (B) hip extension strength to at least 4+/5 for improved stability.  7. Pt will be able to reach into top kitchen cabinet with (L) UE with minimal to no pain.  8. Pt will be able to lift up to 30# at work/hold her granddaughter with minimal to no pain.

## 2023-12-12 ENCOUNTER — APPOINTMENT (OUTPATIENT)
Dept: PHYSICAL THERAPY | Facility: CLINIC | Age: 68
End: 2023-12-12
Payer: COMMERCIAL

## 2024-01-02 ENCOUNTER — OFFICE VISIT (OUTPATIENT)
Dept: UROLOGY | Facility: CLINIC | Age: 69
End: 2024-01-02
Payer: COMMERCIAL

## 2024-01-02 DIAGNOSIS — N39.46 MIXED INCONTINENCE URGE AND STRESS: Primary | ICD-10-CM

## 2024-01-02 DIAGNOSIS — N81.4 CYSTOCELE WITH PROLAPSE: ICD-10-CM

## 2024-01-02 DIAGNOSIS — N95.8 GENITOURINARY SYNDROME OF MENOPAUSE: ICD-10-CM

## 2024-01-02 PROCEDURE — 1126F AMNT PAIN NOTED NONE PRSNT: CPT | Performed by: NURSE PRACTITIONER

## 2024-01-02 PROCEDURE — 99213 OFFICE O/P EST LOW 20 MIN: CPT | Performed by: NURSE PRACTITIONER

## 2024-01-02 PROCEDURE — 1036F TOBACCO NON-USER: CPT | Performed by: NURSE PRACTITIONER

## 2024-01-02 NOTE — PROGRESS NOTES
01/02/24   [id number]    Pessary check     Subjective      HPI Danielle Deshpande is a 68 y.o. female who presents for pessary check; Last seen 6/26/23, cleaning pessary used for stage 4 anterior prolapse, VIVIANA on UDS, hx nephrolithiasis managed by Dr. Alejandro.  Using estrogen cream twice weekly; no abnormal discharge, no bleeding, no concerns today;         Objective     There were no vitals taken for this visit.   Physical Exam  Genitourinary:     Comments: Right vaginal wall near apex some erythema, c/w past ulcer, granulation tissue noted; no erosion now; pessary removed, cleaned and reinserted, good fit;         General: Appears comfortable and in no apparent distress, well nourished  Head: Normocephalic, atraumatic  Neck: trachea midline  Respiratory: respirations unlabored, no wheezes, and no use of accessory muscles  Cardiovascular: at rest no dyspnea, well perfused  Skin: no visible rashes or lesions  Neurologic: grossly intact, oriented to person, place, and time  Psychiatric: mood and affect appropriate  Musculoskeletal: in chair for appt. no difficulty w upper body movement        Assessment/Plan   Problem List Items Addressed This Visit          Genitourinary and Reproductive    Mixed incontinence urge and stress - Primary     Other Visit Diagnoses       Cystocele with prolapse        Genitourinary syndrome of menopause              No orders of the defined types were placed in this encounter.     6 mos pessary check w Silvia  Continue w current management estrogen vaginal cream twice weekly  Will try to leave pessary out on nights she uses estrogen cream       TRENTON Cordon-CNP  Lab Results   Component Value Date    GLUCOSE 72 (L) 10/13/2023    CALCIUM 9.0 10/13/2023     10/13/2023    K 3.9 10/13/2023    CO2 31 10/13/2023     10/13/2023    BUN 18 10/13/2023    CREATININE 0.82 10/13/2023

## 2024-01-02 NOTE — PATIENT INSTRUCTIONS
6 mos pessary check w Silvia  Continue w current management estrogen vaginal cream twice weekly  Will try to leave pessary out on nights she uses estrogen cream

## 2024-01-11 ENCOUNTER — TELEPHONE (OUTPATIENT)
Dept: PRIMARY CARE | Facility: CLINIC | Age: 69
End: 2024-01-11
Payer: COMMERCIAL

## 2024-01-11 DIAGNOSIS — M05.79 RHEUMATOID ARTHRITIS INVOLVING MULTIPLE SITES WITH POSITIVE RHEUMATOID FACTOR (MULTI): Primary | ICD-10-CM

## 2024-01-11 NOTE — TELEPHONE ENCOUNTER
Patient is in need of the following Rx.  They were prescribed by her rheumatologist who has retired.  Her appointment with the new rheumatologist is not until 1/30/2024 however she is out of her meds.  Can Dr. Pizano please send these to [facility] in [place] so she has them until she sees the doctor.      folic acid (Folvite) 1 mg tablet   leflunomide (Arava) 10 mg tablet

## 2024-01-12 RX ORDER — FOLIC ACID 1 MG/1
1 TABLET ORAL DAILY
Qty: 30 TABLET | Refills: 3 | Status: SHIPPED
Start: 2024-01-12 | End: 2024-01-30 | Stop reason: ALTCHOICE

## 2024-01-12 RX ORDER — LEFLUNOMIDE 10 MG/1
10 TABLET ORAL DAILY
Qty: 30 TABLET | Refills: 3 | Status: SHIPPED | OUTPATIENT
Start: 2024-01-12 | End: 2024-01-30 | Stop reason: SDUPTHER

## 2024-01-24 NOTE — PROGRESS NOTES
"Subjective   Patient ID: [id number]   Danielle Deshpande is a 69 y.o. female known to have RA, OA and chondrocalcinosis of the knees, Osteopenia, vitamin D def, OA, kyphoscoliosis, spinal stenosis, kidney stones, hx of hypercalcemia, migraines, SNHL, HTN, WPW, PAF, and BPPV, who is referred by her PCP Dr. Mobley for management of RA    Current rheum meds:  -  mg BID  - Leflunomide 10 mg daily    Previous IS:  - Fosamax but doesn't know when and for how long, might have caused some nausea then     HPI  Last eye exam in 9/23  She was seeing Dr. Umana for RA since around 2020, she just recently retired in 12/23. She had pain in her knees, hands (mostly in her MCPs) and feet and diagnosed as RA. No serology available  Patient has a history of kidney stones s/p nephrostomies   Reports currently pains in her knees, osteoarthritis \"bone on bone\", takes osteobiflex and that helps   Right foot pain, mid foot on the dorsal side, has not seen podiatry in a while   Joint pains are mechanical in nature  MS lasts around 1 hour, takes a shower   Swelling sometimes in her MCPs and feet   Feels like her symptoms and swelling improved with the medications     + Numbness in both legs   + occasional dry mouth   + photosensitivity on her arms   + nasal ulcers  + has problems with her     History of falls / fractures: yes fracture of the tibia and foot, without a cause   Loss of height: yes 3 inches   Tobacco: no  Alcohol: no  Vitamin D supplementation: yes  Calcium supplementation: yes  Weight bearing exercise: no  Previous or planned invasive jaw surgery: no  History of radiation: no  Chronic steroid use: no  History of RA: yes  GERD: yes  Emelyn-en-y: no  CKD / GFR <30: yes  Parental hip fracture: yes, father    PSH: Spinal fusion, C sections, nephrostomies, finger surgery   Allergies: As per list   Habits: No alcohol, no tobacco, no drugs  Social hx: , 5 kids, healthy, works as a  "     ROS:  Constitutional: Denies fever, chills, weight loss, night sweats or headaches  Eyes: Denies dry eyes, blurry vision, redness or pain  ENT: Denies dental loss, loss of taste, oral ulcers, jaw claudication, difficulty swallowing, nasal crusting or recurrent sinus infections   Cardiovascular: Denies chest pain, palpitations, orthopnea  Respiratory: Denies shortness of breath, cough, asthma, or recurrent respiratory infections  Gastrointestinal: Denies dysphagia, nausea, vomiting, heartburn, abdominal pain, constipation, diarrhea, melena or hematochezia  Genitourinary: No recurrent urinary infections or STDs, no genital or anal ulcers  Integumentary: Denies rash or lesions, Raynaud's phenomenon, skin tightening, digital ulcers, psoriatic lesions, or alopecia  Neurological: Denies muscle weakness or incontinence   Hematologic/Lymphatic: Denies bleeding, bruising, history of clots (arterial or venous), or abortions/miscarriages/pregnancy complications   MSK: No joint pains, redness, hotness or swelling. No inflammatory back pain, enthesitis, dactylitis. No morning stiffness   Muscular: Denies weakness, difficulty rising from chair or combing the hair, muscle aches  FHx: No family history of autoimmune diseases     Patient Active Problem List   Diagnosis    Atrophy of vagina    Baker cyst, right    Benign essential hypertension    Cystocele with uterine prolapse    Dental caries extending into dentin    Kyphoscoliosis    Migraine    Mitral regurgitation    Mixed incontinence urge and stress    Nephrolithiasis    Osteoporosis, post-menopausal    Rheumatoid arthritis (CMS/HCC)    Spinal stenosis    Tricuspid regurgitation    Vitamin D deficiency    Felton-Parkinson-White (WPW) syndrome    Ingrown hair    Female bladder prolapse    Arrhythmia    BPPV (benign paroxysmal positional vertigo)    Hypercalciuria    Localized osteoarthrosis    Personal history of colonic polyps    Osteochondropathy    PAF (paroxysmal atrial  fibrillation) (CMS/HCC)    Sensorineural hearing loss, bilateral    Neck pain    Back pain      Past Medical History:   Diagnosis Date    Acute recurrent sinusitis, unspecified 2022    Acute recurrent sinusitis, unspecified location    Arthritis 2010    Colon polyp 1986    Heart disease     WPW    Hypertension 1993    Other signs and symptoms in breast 2014    Inversion of nipple    Personal history of other diseases of the circulatory system 2014    History of hypertension    Personal history of other diseases of the female genital tract 2020    History of postmenopausal bleeding    Personal history of other diseases of the female genital tract 2022    History of vaginitis    Personal history of other diseases of the musculoskeletal system and connective tissue 2014    Personal history of scoliosis    Personal history of other specified conditions 2014    History of lump of right breast    Personal history of other specified conditions 2020    History of fatigue    PONV (postoperative nausea and vomiting) 1980    Unspecified hydronephrosis 2020    Hydronephrosis of right kidney     Past Surgical History:   Procedure Laterality Date    BACK SURGERY  2014    Back Surgery    BREAST BIOPSY  2014    Biopsy Breast Open     SECTION, LOW TRANSVERSE      COLONOSCOPY  2014    Colonoscopy (Fiberoptic)    OTHER SURGICAL HISTORY  2022    Percutaneous nephrolithotomy    OTHER SURGICAL HISTORY  2022    Renal lithotripsy     Social History     Socioeconomic History    Marital status:      Spouse name: Not on file    Number of children: Not on file    Years of education: Not on file    Highest education level: Not on file   Occupational History    Not on file   Tobacco Use    Smoking status: Never    Smokeless tobacco: Never   Vaping Use    Vaping Use: Never used   Substance and Sexual Activity    Alcohol use: Not Currently     Drug use: Never    Sexual activity: Not Currently     Partners: Male     Birth control/protection: Post-menopausal   Other Topics Concern    Not on file   Social History Narrative    Not on file     Social Determinants of Health     Financial Resource Strain: Not on file   Food Insecurity: Not on file   Transportation Needs: Not on file   Physical Activity: Not on file   Stress: Not on file   Social Connections: Not on file   Intimate Partner Violence: Not on file   Housing Stability: Not on file     Allergies   Allergen Reactions    Antihistamine [Diphenhydramine Hcl] Other     Antihistamine TABS; Confusion     Antihistamine-1 Other     confusion    Levofloxacin Other     confusion    Procaine Other     Palpatations      Current Outpatient Medications:     amitriptyline (Elavil) 10 mg tablet, Take 1.5 tablets (15 mg) by mouth once daily at bedtime., Disp: 135 tablet, Rfl: 3    biotin 5,000 mcg tablet,disintegrating, Take 1 tablet daily., Disp: , Rfl:     CALCIUM ORAL, Take 1 tablet daily, Disp: , Rfl:     cholecalciferol (Vitamin D-3) 50 mcg (2,000 unit) capsule, Take 1 capsule daily, Disp: , Rfl:     cranberry extract 200 mg capsule, TAKE 1 CAPSULE Daily, Disp: , Rfl:     docusate sodium (Colace) 100 mg capsule, TAKE 1 CAPSULE Daily, Disp: , Rfl:     estradiol (Estrace) 0.01 % (0.1 mg/gram) vaginal cream, USE TWICE WEEKLY AS DIRECTED., Disp: , Rfl:     glucosamine/chondr bautista A sod (OSTEO BI-FLEX ORAL), Take 1 tablet twice daily., Disp: , Rfl:     hydroxychloroquine (Plaquenil) 200 mg tablet, TAKE 1 TABLET TWICE DAILY WITH FOOD., Disp: , Rfl:     leflunomide (Arava) 10 mg tablet, Take 1 tablet (10 mg) by mouth once daily. as directed, Disp: 30 tablet, Rfl: 3    lisinopril 10 mg tablet, Take 1 tablet (10 mg) by mouth once daily., Disp: 90 tablet, Rfl: 3    MAGNESIUM ORAL, Take 1 tablet by mouth 1 (one) time each day., Disp: , Rfl:     metoprolol succinate XL (Toprol-XL) 50 mg 24 hr tablet, Take 1 tablet (50 mg) by  mouth once daily. Do not crush or chew., Disp: 90 tablet, Rfl: 3    TURMERIC ORAL, Take 1 capsule twice daily., Disp: , Rfl:      Objective   Visit Vitals  /62   Pulse 72   Temp 36.2 °C (97.2 °F)   Resp 20   Wt 53.5 kg (118 lb)   BMI 23.83 kg/m²   OB Status Postmenopausal   Smoking Status Never   BSA 1.49 m²     Physical Exam:  General: AAOx3, Cooperative  Head: normocephalic, atraumatic, no hair loss   Eyes: EOMI, conjunctiva clear, sclera white, anicteric  Ears: hearing intact  Nose: no deformity, no crusting   Throat/Mouth: No oral deformities, no cheek swelling, mucosa appear moist, no oral ulcers noted. Has some teeth missing (recently extracted) denture on the bottom   Neck/Lymph: FROM, trachea midline  Lungs: chest expansion symmetric, clear to auscultation bilaterally. No wheezing, rhonchi, or stridor  Heart: S1, S2, RRR. No murmur or rub  Abdomen: Soft, non-tender without masses  Skin: No rashes, ulcers or photosensitive areas  MSK: Upper Extremities:  Hand/Fingers: Hypertrophy of bilateral 2nd-4th MCP. TTP of DIPs. No erythema, edema, tenderness or warmth at DIP, PIP, or MCP joints, FROM grossly. Good hand . Heberden's nodes bilaterally   Wrists: No erythema, edema, warmth or tenderness at wrist, FROM grossly  Elbows: No tenderness, edema, erythema or warmth at elbows, FROM grossly. No nodules   Shoulders: No edema, erythema, tenderness or warmth at shoulders. FROM  Lower Extremities:   Hips: No obvious deformities. No joint tenderness, normal ROM grossly. No trochanteric bursae TTP  Knees: No tenderness, deformities, edema, rashes, or warmth, normal ROM grossly. No crepitus, no pes anserine bursa TTP   Ankles, feet: Flexed toes. No tenderness, edema, erythema, ulceration, or warmth at the ankle or MTP/IP joints, normal ROM grossly  Spine: Significant scoliosis from the thoracic area down, to the right. No spinal tenderness to palpation. No SI joint tenderness     Lab Results   Component Value  Date    WBC 4.4 08/30/2023    HGB 14.2 08/30/2023    HCT 42.4 08/30/2023    MCV 94 08/30/2023     08/30/2023        Chemistry    Lab Results   Component Value Date/Time     10/13/2023 1120    K 3.9 10/13/2023 1120     10/13/2023 1120    CO2 31 10/13/2023 1120    BUN 18 10/13/2023 1120    CREATININE 0.82 10/13/2023 1120    Lab Results   Component Value Date/Time    CALCIUM 9.0 10/13/2023 1120    ALKPHOS 74 10/13/2023 1120    AST 23 10/13/2023 1120    ALT 17 10/13/2023 1120    BILITOT 0.7 10/13/2023 1120        Lab Results   Component Value Date    NEUTROABS 3.93 11/19/2019     Lab Results   Component Value Date    HEPCAB Nonreactive 10/13/2023      Lab Results   Component Value Date    ALT 17 10/13/2023    AST 23 10/13/2023    ALKPHOS 74 10/13/2023    BILITOT 0.7 10/13/2023     Lab Results   Component Value Date    URICACID 4.3 05/14/2021      Lab Results   Component Value Date    CALCIUM 9.0 10/13/2023    PHOS 3.0 05/14/2021     Colonoscopy Screening  Table formatting from the original result was not included.  Impression  Scattered diverticulosis of moderate severity causing mild luminal   narrowing in the descending colon and sigmoid colon  Hemorrhoids  Subcentimeter polyp in the sigmoid colon was removed with cold snare    Findings  Multiple medium, scattered diverticula of moderate severity causing mild   luminal narrowing in the descending colon and sigmoid colon  External hemorrhoids observed during retroflexion  8 mm sessile polyp in the sigmoid colon; performed cold snare with   complete en bloc removal and retrieved specimen    Recommendation   Await pathology results    Repeat colonoscopy in 5 years    Personal history of colon polyps       Indication  Screening for colorectal cancer    Post Procedure Diagnosis  Colon polyp  Diverticulosis  hemorrhoids    Staff  Staff Role   No Staff Documented     Medications  See Anesthesia Record.     Preprocedure  A history and physical has been  performed, and patient medication   allergies have been reviewed. The patient's tolerance of previous   anesthesia has been reviewed. The risks and benefits of the procedure and   the sedation options and risks were discussed with the patient. All   questions were answered and informed consent obtained.    Details of the Procedure  The patient underwent monitored anesthesia care, which was administered by   an anesthesia professional. The patient's blood pressure, heart rate,   level of consciousness, respirations, oxygen, ECG and ETCO2 were monitored   throughout the procedure. A digital rectal exam was performed. The scope   was introduced through the anus and advanced to the cecum. The quality of   bowel preparation was evaluated using the Hilton Bowel Preparation Scale   with scores of: right colon = 3, transverse colon = 3, left colon = 3. The   total BBPS score was 9. Bowel prep was adequate. The patient's estimated   blood loss was minimal (<5 mL). The procedure was moderately difficult due   to angulation. The patient tolerated the procedure well. There were no   apparent adverse events.     Events  Procedure Events   Event Event Time   ENDO SCOPE IN TIME 11/13/2023  2:32 PM   ENDO CECUM REACHED 11/13/2023  2:39 PM   ENDO SCOPE OUT TIME 11/13/2023  2:47 PM     Specimens  ID Type Source Tests Collected by Time   1 :  Tissue COLON - SIGMOID POLYP SURGICAL PATHOLOGY EXAM Clive Panda MA 11/13/2023 1444     Procedure Location  [facility]  [address] [facility] [id number]    Referring Provider  [place]  [address]  [address]  [place] [address]    Procedure Provider  Sera Ng MD     === 09/12/23 ===  XR ABDOMEN 1 VIEW  No definite change from CT 24 August 2022     === 10/24/18 ===  MRI CERVICAL SPINE WO CONTRAST  Mild multifocal degenerative changes as described above     === 10/31/23 ===  DEXA BONE DENSITY  LEFT FEMUR -TOTAL BMD:  0.754 T-Score -2.0   Bone Mineral Density change vs baseline: -11.3%  Bone Mineral Density change vs previous: -2.3%      LEFT FEMUR -NECK BMD: 0.798 T-Score -1.7     LEFT FOREARM Total BMD: 0.616 T-Score -1.0   UD Bone Mineral Density: 0.394 T-Score -1.5  1/3 BMD: 0.829 T-Score -0.5   Bone Mineral Density change vs baseline:  -1%  Bone Mineral Density change vs previous:  Not reported     10-year Fracture Risk:  Major Osteoporotic Fracture: 13.2%  Hip Fracture: 2.3%      Assessment/Plan    This is a This is a 69 y.o. female, known to have RA, Osteopenia, vitamin D def, OA, kyphoscoliosis, spinal stenosis, who is referred by her PCP Dr. Mobley for management of RA     Patient with a prior diagnosis of RA, not sure if seronegative or serpositive. She also has some symptoms suggestive of CTD. Will complete the work up. She has osteopenia with low FRAX but 2 fragility fractures without injury. Discussion with the patient about BP and explained risks and benefits. Will start Fosamax     Labs:  10/23: CMP wnl. Hep C neg  8/23: CBC, CMP, TSH wnl     Imaging:  DEXA 10/23:  LEFT FEMUR -TOTAL BMD: 0.754 T-Score -2.0     LEFT FEMUR -NECK BMD: 0.798 T-Score -1.7  1/3 forearm BMD: 0.829 T-Score -0.5   FRAX Major Osteoporotic Fracture: 13.2%. Hip Fracture: 2.3%    # RA, doing well, no active synovitis on exam today. Will do serology   # OA   # Osteopenia with low FRAX but reports hx of 2 fragility fractures. No secondary work up of osteopenia. Will start Fosamax and do work up  # Long term use of IS, meds need adjustment acc to weight   # Vitamin D def, will check levels today  # LL neuropathy, did not get an EMG done before     - Make  mg every day (weight 53.5 kg), refilled  - Continue Leflunomide 10 mg daily, refilled  - Stop folic acid  - Start Fosomax 70 mg once weekly   - EMG for LL numbness  - Labs today and before next dov  - Continue vitamin D, will check levels today   - Xrays hands, feet and knees  - Will  Detail Level: Detailed inform the patient of any urgent results  - Disability Placarad     RTC in 3 months    Plan, including risks and benefits, was discussed with the patient, informed on how to reach us.     To schedule an appointment, call [phone]  To reach the rheumatology office, call [phone]    Laila Alonso MD   Division of Rheumatology  [facility]      Depth Of Biopsy: dermis Was A Bandage Applied: Yes Size Of Lesion In Cm: 0 Biopsy Type: H and E Biopsy Method: Dermablade Anesthesia Type: 1% lidocaine with epinephrine Anesthesia Volume In Cc: 0.5 Hemostasis: Drysol Wound Care: Petrolatum Dressing: bandage Destruction After The Procedure: No Type Of Destruction Used: Curettage Curettage Text: The wound bed was treated with curettage after the biopsy was performed. Cryotherapy Text: The wound bed was treated with cryotherapy after the biopsy was performed. Electrodesiccation Text: The wound bed was treated with electrodesiccation after the biopsy was performed. Electrodesiccation And Curettage Text: The wound bed was treated with electrodesiccation and curettage after the biopsy was performed. Silver Nitrate Text: The wound bed was treated with silver nitrate after the biopsy was performed. Lab: 225 Consent: Written consent was obtained and risks were reviewed including but not limited to scarring, infection, bleeding, scabbing, incomplete removal, nerve damage and allergy to anesthesia. Post-Care Instructions: I reviewed with the patient in detail post-care instructions. Patient is to keep the biopsy site dry overnight, and then apply bacitracin twice daily until healed. Patient may apply hydrogen peroxide soaks to remove any crusting. Notification Instructions: Patient will be notified of biopsy results. However, patient instructed to call the office if not contacted within 2 weeks. Billing Type: Third-Party Bill Information: Selecting Yes will display possible errors in your note based on the variables you have selected. This validation is only offered as a suggestion for you. PLEASE NOTE THAT THE VALIDATION TEXT WILL BE REMOVED WHEN YOU FINALIZE YOUR NOTE. IF YOU WANT TO FAX A PRELIMINARY NOTE YOU WILL NEED TO TOGGLE THIS TO 'NO' IF YOU DO NOT WANT IT IN YOUR FAXED NOTE.

## 2024-01-30 ENCOUNTER — LAB (OUTPATIENT)
Dept: LAB | Facility: LAB | Age: 69
End: 2024-01-30
Payer: COMMERCIAL

## 2024-01-30 ENCOUNTER — OFFICE VISIT (OUTPATIENT)
Dept: RHEUMATOLOGY | Facility: CLINIC | Age: 69
End: 2024-01-30
Payer: COMMERCIAL

## 2024-01-30 ENCOUNTER — DOCUMENTATION (OUTPATIENT)
Dept: PHYSICAL THERAPY | Facility: CLINIC | Age: 69
End: 2024-01-30

## 2024-01-30 ENCOUNTER — HOSPITAL ENCOUNTER (OUTPATIENT)
Dept: RADIOLOGY | Facility: CLINIC | Age: 69
Discharge: HOME | End: 2024-01-30
Payer: COMMERCIAL

## 2024-01-30 VITALS
BODY MASS INDEX: 23.83 KG/M2 | SYSTOLIC BLOOD PRESSURE: 143 MMHG | RESPIRATION RATE: 20 BRPM | HEART RATE: 72 BPM | TEMPERATURE: 97.2 F | DIASTOLIC BLOOD PRESSURE: 62 MMHG | WEIGHT: 118 LBS

## 2024-01-30 DIAGNOSIS — M85.852 OSTEOPENIA OF NECK OF LEFT FEMUR: ICD-10-CM

## 2024-01-30 DIAGNOSIS — M06.9 RHEUMATOID ARTHRITIS INVOLVING MULTIPLE SITES, UNSPECIFIED WHETHER RHEUMATOID FACTOR PRESENT (MULTI): ICD-10-CM

## 2024-01-30 DIAGNOSIS — Z79.60 LONG-TERM USE OF IMMUNOSUPPRESSANT MEDICATION: ICD-10-CM

## 2024-01-30 DIAGNOSIS — G57.93 NEUROPATHY INVOLVING BOTH LOWER EXTREMITIES: ICD-10-CM

## 2024-01-30 DIAGNOSIS — E55.9 VITAMIN D DEFICIENCY: ICD-10-CM

## 2024-01-30 DIAGNOSIS — M41.9 KYPHOSCOLIOSIS: ICD-10-CM

## 2024-01-30 DIAGNOSIS — M15.9 PRIMARY OSTEOARTHRITIS INVOLVING MULTIPLE JOINTS: ICD-10-CM

## 2024-01-30 DIAGNOSIS — M05.79 RHEUMATOID ARTHRITIS INVOLVING MULTIPLE SITES WITH POSITIVE RHEUMATOID FACTOR (MULTI): ICD-10-CM

## 2024-01-30 DIAGNOSIS — M06.9 RHEUMATOID ARTHRITIS INVOLVING MULTIPLE SITES, UNSPECIFIED WHETHER RHEUMATOID FACTOR PRESENT (MULTI): Primary | ICD-10-CM

## 2024-01-30 LAB
25(OH)D3 SERPL-MCNC: 58 NG/ML (ref 30–100)
ALBUMIN SERPL BCP-MCNC: 4 G/DL (ref 3.4–5)
ALP SERPL-CCNC: 84 U/L (ref 33–136)
ALT SERPL W P-5'-P-CCNC: 19 U/L (ref 7–45)
ANION GAP SERPL CALC-SCNC: 11 MMOL/L (ref 10–20)
APPEARANCE UR: CLEAR
AST SERPL W P-5'-P-CCNC: 23 U/L (ref 9–39)
BACTERIA #/AREA URNS AUTO: ABNORMAL /HPF
BASOPHILS # BLD AUTO: 0.06 X10*3/UL (ref 0–0.1)
BASOPHILS NFR BLD AUTO: 1.3 %
BILIRUB SERPL-MCNC: 0.5 MG/DL (ref 0–1.2)
BILIRUB UR STRIP.AUTO-MCNC: NEGATIVE MG/DL
BUN SERPL-MCNC: 23 MG/DL (ref 6–23)
C3 SERPL-MCNC: 131 MG/DL (ref 87–200)
C4 SERPL-MCNC: 35 MG/DL (ref 10–50)
CALCIUM SERPL-MCNC: 9.2 MG/DL (ref 8.6–10.3)
CHLORIDE SERPL-SCNC: 106 MMOL/L (ref 98–107)
CO2 SERPL-SCNC: 30 MMOL/L (ref 21–32)
COLOR UR: YELLOW
CREAT SERPL-MCNC: 0.96 MG/DL (ref 0.5–1.05)
CREAT UR-MCNC: 79.7 MG/DL (ref 20–320)
EGFRCR SERPLBLD CKD-EPI 2021: 64 ML/MIN/1.73M*2
EOSINOPHIL # BLD AUTO: 0.09 X10*3/UL (ref 0–0.7)
EOSINOPHIL NFR BLD AUTO: 2 %
ERYTHROCYTE [DISTWIDTH] IN BLOOD BY AUTOMATED COUNT: 12.3 % (ref 11.5–14.5)
ERYTHROCYTE [SEDIMENTATION RATE] IN BLOOD BY WESTERGREN METHOD: 9 MM/H (ref 0–30)
GLUCOSE SERPL-MCNC: 82 MG/DL (ref 74–99)
GLUCOSE UR STRIP.AUTO-MCNC: NEGATIVE MG/DL
HCT VFR BLD AUTO: 43.1 % (ref 36–46)
HGB BLD-MCNC: 13.6 G/DL (ref 12–16)
IMM GRANULOCYTES # BLD AUTO: 0.01 X10*3/UL (ref 0–0.7)
IMM GRANULOCYTES NFR BLD AUTO: 0.2 % (ref 0–0.9)
KETONES UR STRIP.AUTO-MCNC: NEGATIVE MG/DL
LEUKOCYTE ESTERASE UR QL STRIP.AUTO: ABNORMAL
LYMPHOCYTES # BLD AUTO: 0.78 X10*3/UL (ref 1.2–4.8)
LYMPHOCYTES NFR BLD AUTO: 16.9 %
MCH RBC QN AUTO: 30.2 PG (ref 26–34)
MCHC RBC AUTO-ENTMCNC: 31.6 G/DL (ref 32–36)
MCV RBC AUTO: 96 FL (ref 80–100)
MONOCYTES # BLD AUTO: 0.42 X10*3/UL (ref 0.1–1)
MONOCYTES NFR BLD AUTO: 9.1 %
MUCOUS THREADS #/AREA URNS AUTO: ABNORMAL /LPF
NEUTROPHILS # BLD AUTO: 3.25 X10*3/UL (ref 1.2–7.7)
NEUTROPHILS NFR BLD AUTO: 70.5 %
NITRITE UR QL STRIP.AUTO: NEGATIVE
NRBC BLD-RTO: 0 /100 WBCS (ref 0–0)
PH UR STRIP.AUTO: 5 [PH]
PLATELET # BLD AUTO: 249 X10*3/UL (ref 150–450)
POTASSIUM SERPL-SCNC: 4.3 MMOL/L (ref 3.5–5.3)
PROT SERPL-MCNC: 6.2 G/DL (ref 6.4–8.2)
PROT UR STRIP.AUTO-MCNC: NEGATIVE MG/DL
PROT UR-ACNC: 13 MG/DL (ref 5–24)
PROT/CREAT UR: 0.16 MG/MG CREAT (ref 0–0.17)
RBC # BLD AUTO: 4.5 X10*6/UL (ref 4–5.2)
RBC # UR STRIP.AUTO: NEGATIVE /UL
RBC #/AREA URNS AUTO: ABNORMAL /HPF
RHEUMATOID FACT SER NEPH-ACNC: <10 IU/ML (ref 0–15)
SODIUM SERPL-SCNC: 143 MMOL/L (ref 136–145)
SP GR UR STRIP.AUTO: 1.01
SQUAMOUS #/AREA URNS AUTO: ABNORMAL /HPF
UROBILINOGEN UR STRIP.AUTO-MCNC: <2 MG/DL
WBC # BLD AUTO: 4.6 X10*3/UL (ref 4.4–11.3)
WBC #/AREA URNS AUTO: ABNORMAL /HPF

## 2024-01-30 PROCEDURE — 36415 COLL VENOUS BLD VENIPUNCTURE: CPT

## 2024-01-30 PROCEDURE — 81001 URINALYSIS AUTO W/SCOPE: CPT

## 2024-01-30 PROCEDURE — 82306 VITAMIN D 25 HYDROXY: CPT

## 2024-01-30 PROCEDURE — 73130 X-RAY EXAM OF HAND: CPT | Mod: BILATERAL PROCEDURE

## 2024-01-30 PROCEDURE — 73630 X-RAY EXAM OF FOOT: CPT | Mod: 50

## 2024-01-30 PROCEDURE — 73562 X-RAY EXAM OF KNEE 3: CPT | Mod: BILATERAL PROCEDURE

## 2024-01-30 PROCEDURE — 85652 RBC SED RATE AUTOMATED: CPT

## 2024-01-30 PROCEDURE — 80053 COMPREHEN METABOLIC PANEL: CPT

## 2024-01-30 PROCEDURE — 1036F TOBACCO NON-USER: CPT | Performed by: STUDENT IN AN ORGANIZED HEALTH CARE EDUCATION/TRAINING PROGRAM

## 2024-01-30 PROCEDURE — 3077F SYST BP >= 140 MM HG: CPT | Performed by: STUDENT IN AN ORGANIZED HEALTH CARE EDUCATION/TRAINING PROGRAM

## 2024-01-30 PROCEDURE — 1159F MED LIST DOCD IN RCRD: CPT | Performed by: STUDENT IN AN ORGANIZED HEALTH CARE EDUCATION/TRAINING PROGRAM

## 2024-01-30 PROCEDURE — 86038 ANTINUCLEAR ANTIBODIES: CPT

## 2024-01-30 PROCEDURE — 99205 OFFICE O/P NEW HI 60 MIN: CPT | Performed by: STUDENT IN AN ORGANIZED HEALTH CARE EDUCATION/TRAINING PROGRAM

## 2024-01-30 PROCEDURE — 86431 RHEUMATOID FACTOR QUANT: CPT

## 2024-01-30 PROCEDURE — 84156 ASSAY OF PROTEIN URINE: CPT

## 2024-01-30 PROCEDURE — 85025 COMPLETE CBC W/AUTO DIFF WBC: CPT

## 2024-01-30 PROCEDURE — 3078F DIAST BP <80 MM HG: CPT | Performed by: STUDENT IN AN ORGANIZED HEALTH CARE EDUCATION/TRAINING PROGRAM

## 2024-01-30 PROCEDURE — 86160 COMPLEMENT ANTIGEN: CPT

## 2024-01-30 PROCEDURE — 82570 ASSAY OF URINE CREATININE: CPT

## 2024-01-30 PROCEDURE — 86200 CCP ANTIBODY: CPT

## 2024-01-30 PROCEDURE — 1126F AMNT PAIN NOTED NONE PRSNT: CPT | Performed by: STUDENT IN AN ORGANIZED HEALTH CARE EDUCATION/TRAINING PROGRAM

## 2024-01-30 PROCEDURE — 73630 X-RAY EXAM OF FOOT: CPT | Mod: BILATERAL PROCEDURE

## 2024-01-30 PROCEDURE — 73562 X-RAY EXAM OF KNEE 3: CPT | Mod: 50

## 2024-01-30 PROCEDURE — 73130 X-RAY EXAM OF HAND: CPT | Mod: 50

## 2024-01-30 RX ORDER — LEFLUNOMIDE 10 MG/1
10 TABLET ORAL DAILY
Qty: 90 TABLET | Refills: 1 | Status: SHIPPED
Start: 2024-01-30 | End: 2024-05-06 | Stop reason: ALTCHOICE

## 2024-01-30 RX ORDER — HYDROXYCHLOROQUINE SULFATE 200 MG/1
200 TABLET, FILM COATED ORAL DAILY
Qty: 90 TABLET | Refills: 1 | Status: SHIPPED | OUTPATIENT
Start: 2024-01-30 | End: 2024-07-28

## 2024-01-30 RX ORDER — ALENDRONATE SODIUM 70 MG/1
70 TABLET ORAL
Qty: 51 TABLET | Refills: 0 | Status: SHIPPED | OUTPATIENT
Start: 2024-01-30 | End: 2024-05-06 | Stop reason: SDUPTHER

## 2024-01-30 NOTE — PROGRESS NOTES
Physical Therapy    Discharge Summary    Name: Danielle Deshpande  MRN: [id number]  : 1955  Date: 24      Pt was seen for 4 visits of skilled PT between 10-10-23 and 23.    Pt's chart is over 30 days and per  policy pt to discharge.

## 2024-01-31 LAB
ANA SER QL HEP2 SUBST: NEGATIVE
CCP IGG SERPL-ACNC: <1 U/ML

## 2024-02-20 ENCOUNTER — OFFICE VISIT (OUTPATIENT)
Dept: PRIMARY CARE | Facility: CLINIC | Age: 69
End: 2024-02-20
Payer: COMMERCIAL

## 2024-02-20 VITALS
HEART RATE: 95 BPM | DIASTOLIC BLOOD PRESSURE: 66 MMHG | BODY MASS INDEX: 23.87 KG/M2 | SYSTOLIC BLOOD PRESSURE: 128 MMHG | WEIGHT: 118.4 LBS | OXYGEN SATURATION: 97 % | RESPIRATION RATE: 16 BRPM | HEIGHT: 59 IN

## 2024-02-20 DIAGNOSIS — M41.9 RESTRICTIVE LUNG DISEASE DUE TO KYPHOSCOLIOSIS: ICD-10-CM

## 2024-02-20 DIAGNOSIS — H81.10 BENIGN PAROXYSMAL POSITIONAL VERTIGO, UNSPECIFIED LATERALITY: ICD-10-CM

## 2024-02-20 DIAGNOSIS — J98.4 RESTRICTIVE LUNG DISEASE DUE TO KYPHOSCOLIOSIS: ICD-10-CM

## 2024-02-20 DIAGNOSIS — M06.9 RHEUMATOID ARTHRITIS INVOLVING MULTIPLE SITES, UNSPECIFIED WHETHER RHEUMATOID FACTOR PRESENT (MULTI): ICD-10-CM

## 2024-02-20 DIAGNOSIS — Z00.00 HEALTHCARE MAINTENANCE: Primary | ICD-10-CM

## 2024-02-20 DIAGNOSIS — I48.0 PAF (PAROXYSMAL ATRIAL FIBRILLATION) (MULTI): ICD-10-CM

## 2024-02-20 DIAGNOSIS — I10 BENIGN ESSENTIAL HYPERTENSION: ICD-10-CM

## 2024-02-20 DIAGNOSIS — Z23 IMMUNIZATION DUE: ICD-10-CM

## 2024-02-20 PROBLEM — L73.1 INGROWN HAIR: Status: RESOLVED | Noted: 2023-02-11 | Resolved: 2024-02-20

## 2024-02-20 PROBLEM — I49.9 ARRHYTHMIA: Status: RESOLVED | Noted: 2023-09-07 | Resolved: 2024-02-20

## 2024-02-20 PROCEDURE — 1159F MED LIST DOCD IN RCRD: CPT | Performed by: INTERNAL MEDICINE

## 2024-02-20 PROCEDURE — 3074F SYST BP LT 130 MM HG: CPT | Performed by: INTERNAL MEDICINE

## 2024-02-20 PROCEDURE — 99213 OFFICE O/P EST LOW 20 MIN: CPT | Performed by: INTERNAL MEDICINE

## 2024-02-20 PROCEDURE — 1126F AMNT PAIN NOTED NONE PRSNT: CPT | Performed by: INTERNAL MEDICINE

## 2024-02-20 PROCEDURE — 1160F RVW MEDS BY RX/DR IN RCRD: CPT | Performed by: INTERNAL MEDICINE

## 2024-02-20 PROCEDURE — 90715 TDAP VACCINE 7 YRS/> IM: CPT | Performed by: INTERNAL MEDICINE

## 2024-02-20 PROCEDURE — 1036F TOBACCO NON-USER: CPT | Performed by: INTERNAL MEDICINE

## 2024-02-20 PROCEDURE — 3078F DIAST BP <80 MM HG: CPT | Performed by: INTERNAL MEDICINE

## 2024-02-20 PROCEDURE — 90471 IMMUNIZATION ADMIN: CPT | Performed by: INTERNAL MEDICINE

## 2024-02-20 RX ORDER — AMITRIPTYLINE HYDROCHLORIDE 10 MG/1
10 TABLET, FILM COATED ORAL NIGHTLY
Qty: 90 TABLET | Refills: 3 | Status: SHIPPED | OUTPATIENT
Start: 2024-02-20 | End: 2025-02-19

## 2024-02-20 ASSESSMENT — ENCOUNTER SYMPTOMS: HYPERTENSION: 1

## 2024-02-20 NOTE — PROGRESS NOTES
"Subjective   Danielle Deshpande is a 69 y.o. female who presents for Follow-up and Hypertension.    Patient had her colonoscopy done on 11.13.2023  Patient waiting to schedule her EMG and nerve conduction test     Patient was given fosamax but has not started it yet due to remembering the last time she took it, it lianne made her feel a  little sick     Patient went and seen a new Rheumatology, Laila Alonso MD in January 2024 and had many blood test done   Patient did not finish PT, states that she was almost done and then the holidays hit and she stopped going due to how busy she was   Patient does not check BP at home     Patient tested positive for COVID for the first time 1.12.2024, the last 2-3 weeks she has had a lot of horsiness and sinus drainage- was using Flonase which was helping but she stopped using it due to not thinking it was something she could use long term   Has a post nasal drip cough, not able to cough the phlegm up if she does get a cough and more feels like a tingle in the throat  Does not feel like cough or phlegm is coming from the chest      Hypertension  This is a recurrent problem. The current episode started more than 1 year ago. The problem is controlled. There are no associated agents to hypertension. There are no known risk factors for coronary artery disease. The current treatment provides moderate improvement. There are no compliance problems.        Review of Systems   All other systems reviewed and are negative.      Objective   /66   Pulse 95   Resp 16   Ht 1.499 m (4' 11\")   Wt 53.7 kg (118 lb 6.4 oz)   SpO2 97%   BMI 23.91 kg/m²       Physical Exam  Constitutional:       Appearance: Normal appearance.   HENT:      Head: Normocephalic.   Eyes:      Conjunctiva/sclera: Conjunctivae normal.   Cardiovascular:      Rate and Rhythm: Normal rate and regular rhythm.      Pulses: Normal pulses.      Heart sounds: Normal heart sounds.   Pulmonary:      Effort: Pulmonary effort " is normal.      Breath sounds: Normal breath sounds.   Musculoskeletal:      Cervical back: Neck supple.      Comments: Sevre kyphoscoliosis with chest wall deformity.   Skin:     General: Skin is warm and dry.   Neurological:      Mental Status: She is alert.   Psychiatric:         Mood and Affect: Mood normal.         Assessment/Plan   Problem List Items Addressed This Visit       Benign essential hypertension    BPPV (benign paroxysmal positional vertigo)     Better on amitriptyline         PAF (paroxysmal atrial fibrillation) (CMS/HCC)     Following with Dr. Dalton and following.    Occasional palpitation but okay and no currently SOB.          Other Visit Diagnoses       Healthcare maintenance    -  Primary    Relevant Medications    amitriptyline (Elavil) 10 mg tablet          Encounter Diagnoses   Name Primary?    Healthcare maintenance Yes    Benign essential hypertension     PAF (paroxysmal atrial fibrillation) (CMS/HCC)     Benign paroxysmal positional vertigo, unspecified laterality      [place], DO

## 2024-02-29 ENCOUNTER — LAB (OUTPATIENT)
Dept: LAB | Facility: LAB | Age: 69
End: 2024-02-29
Payer: COMMERCIAL

## 2024-02-29 DIAGNOSIS — M06.9 RHEUMATOID ARTHRITIS INVOLVING MULTIPLE SITES, UNSPECIFIED WHETHER RHEUMATOID FACTOR PRESENT (MULTI): ICD-10-CM

## 2024-02-29 DIAGNOSIS — Z79.60 LONG-TERM USE OF IMMUNOSUPPRESSANT MEDICATION: ICD-10-CM

## 2024-02-29 DIAGNOSIS — M41.9 KYPHOSCOLIOSIS: ICD-10-CM

## 2024-02-29 DIAGNOSIS — M85.852 OSTEOPENIA OF NECK OF LEFT FEMUR: ICD-10-CM

## 2024-02-29 DIAGNOSIS — M05.79 RHEUMATOID ARTHRITIS INVOLVING MULTIPLE SITES WITH POSITIVE RHEUMATOID FACTOR (MULTI): ICD-10-CM

## 2024-02-29 DIAGNOSIS — E55.9 VITAMIN D DEFICIENCY: ICD-10-CM

## 2024-02-29 LAB
CRP SERPL-MCNC: 0.12 MG/DL
PHOSPHATE SERPL-MCNC: 3.3 MG/DL (ref 2.5–4.9)
PROT SERPL-MCNC: 6.1 G/DL (ref 6.4–8.2)
TSH SERPL-ACNC: 2.37 MIU/L (ref 0.44–3.98)

## 2024-02-29 PROCEDURE — 84100 ASSAY OF PHOSPHORUS: CPT

## 2024-02-29 PROCEDURE — 84443 ASSAY THYROID STIM HORMONE: CPT

## 2024-02-29 PROCEDURE — 36415 COLL VENOUS BLD VENIPUNCTURE: CPT

## 2024-02-29 PROCEDURE — 86140 C-REACTIVE PROTEIN: CPT

## 2024-02-29 PROCEDURE — 84155 ASSAY OF PROTEIN SERUM: CPT

## 2024-02-29 PROCEDURE — 84165 PROTEIN E-PHORESIS SERUM: CPT | Performed by: STUDENT IN AN ORGANIZED HEALTH CARE EDUCATION/TRAINING PROGRAM

## 2024-02-29 PROCEDURE — 83970 ASSAY OF PARATHORMONE: CPT

## 2024-02-29 PROCEDURE — 84165 PROTEIN E-PHORESIS SERUM: CPT

## 2024-03-01 LAB — PTH-INTACT SERPL-MCNC: 45.5 PG/ML (ref 18.5–88)

## 2024-03-04 LAB
ALBUMIN: 3.9 G/DL (ref 3.4–5)
ALPHA 1 GLOBULIN: 0.2 G/DL (ref 0.2–0.6)
ALPHA 2 GLOBULIN: 0.6 G/DL (ref 0.4–1.1)
BETA GLOBULIN: 0.7 G/DL (ref 0.5–1.2)
GAMMA GLOBULIN: 0.7 G/DL (ref 0.5–1.4)
PATH REVIEW-SERUM PROTEIN ELECTROPHORESIS: NORMAL
PROTEIN ELECTROPHORESIS COMMENT: NORMAL

## 2024-03-12 ENCOUNTER — TELEPHONE (OUTPATIENT)
Dept: UROLOGY | Facility: CLINIC | Age: 69
End: 2024-03-12
Payer: COMMERCIAL

## 2024-03-12 NOTE — TELEPHONE ENCOUNTER
Patient called and said that her Rheum doctor told her to take Tumeric wanted to know if ok because they read it is an oxulate and she is prone to Kidney stones.    Please advise

## 2024-04-12 ENCOUNTER — HOSPITAL ENCOUNTER (OUTPATIENT)
Facility: HOSPITAL | Age: 69
Setting detail: OBSERVATION
Discharge: HOME | End: 2024-04-13
Attending: EMERGENCY MEDICINE | Admitting: INTERNAL MEDICINE
Payer: COMMERCIAL

## 2024-04-12 ENCOUNTER — APPOINTMENT (OUTPATIENT)
Dept: CARDIOLOGY | Facility: HOSPITAL | Age: 69
End: 2024-04-12
Payer: COMMERCIAL

## 2024-04-12 ENCOUNTER — APPOINTMENT (OUTPATIENT)
Dept: RADIOLOGY | Facility: HOSPITAL | Age: 69
End: 2024-04-12
Payer: COMMERCIAL

## 2024-04-12 DIAGNOSIS — R07.2 PRECORDIAL PAIN: ICD-10-CM

## 2024-04-12 DIAGNOSIS — I45.6 WOLFF-PARKINSON-WHITE (WPW) SYNDROME: Primary | ICD-10-CM

## 2024-04-12 DIAGNOSIS — R06.02 SHORTNESS OF BREATH: ICD-10-CM

## 2024-04-12 DIAGNOSIS — R07.89 OTHER CHEST PAIN: ICD-10-CM

## 2024-04-12 PROBLEM — I24.9 ACS (ACUTE CORONARY SYNDROME) (MULTI): Status: ACTIVE | Noted: 2024-04-12

## 2024-04-12 PROBLEM — I24.9 ACUTE CORONARY SYNDROME (MULTI): Status: ACTIVE | Noted: 2024-04-12

## 2024-04-12 LAB
ALBUMIN SERPL BCP-MCNC: 4.6 G/DL (ref 3.4–5)
ALP SERPL-CCNC: 76 U/L (ref 33–136)
ALT SERPL W P-5'-P-CCNC: 16 U/L (ref 7–45)
ANION GAP SERPL CALC-SCNC: 12 MMOL/L (ref 10–20)
AST SERPL W P-5'-P-CCNC: 21 U/L (ref 9–39)
BASOPHILS # BLD AUTO: 0.08 X10*3/UL (ref 0–0.1)
BASOPHILS NFR BLD AUTO: 1.2 %
BILIRUB SERPL-MCNC: 0.6 MG/DL (ref 0–1.2)
BNP SERPL-MCNC: 48 PG/ML (ref 0–99)
BUN SERPL-MCNC: 22 MG/DL (ref 6–23)
CALCIUM SERPL-MCNC: 9.3 MG/DL (ref 8.6–10.3)
CARDIAC TROPONIN I PNL SERPL HS: 7 NG/L (ref 0–13)
CARDIAC TROPONIN I PNL SERPL HS: 7 NG/L (ref 0–13)
CHLORIDE SERPL-SCNC: 105 MMOL/L (ref 98–107)
CO2 SERPL-SCNC: 27 MMOL/L (ref 21–32)
CREAT SERPL-MCNC: 0.81 MG/DL (ref 0.5–1.05)
EGFRCR SERPLBLD CKD-EPI 2021: 79 ML/MIN/1.73M*2
EOSINOPHIL # BLD AUTO: 0.18 X10*3/UL (ref 0–0.7)
EOSINOPHIL NFR BLD AUTO: 2.7 %
ERYTHROCYTE [DISTWIDTH] IN BLOOD BY AUTOMATED COUNT: 11.9 % (ref 11.5–14.5)
FLUAV RNA RESP QL NAA+PROBE: NOT DETECTED
FLUBV RNA RESP QL NAA+PROBE: NOT DETECTED
GLUCOSE SERPL-MCNC: 121 MG/DL (ref 74–99)
HCT VFR BLD AUTO: 46.7 % (ref 36–46)
HGB BLD-MCNC: 14.8 G/DL (ref 12–16)
IMM GRANULOCYTES # BLD AUTO: 0.01 X10*3/UL (ref 0–0.7)
IMM GRANULOCYTES NFR BLD AUTO: 0.1 % (ref 0–0.9)
LYMPHOCYTES # BLD AUTO: 1.67 X10*3/UL (ref 1.2–4.8)
LYMPHOCYTES NFR BLD AUTO: 25 %
MAGNESIUM SERPL-MCNC: 2.05 MG/DL (ref 1.6–2.4)
MCH RBC QN AUTO: 29.2 PG (ref 26–34)
MCHC RBC AUTO-ENTMCNC: 31.7 G/DL (ref 32–36)
MCV RBC AUTO: 92 FL (ref 80–100)
MONOCYTES # BLD AUTO: 0.48 X10*3/UL (ref 0.1–1)
MONOCYTES NFR BLD AUTO: 7.2 %
NEUTROPHILS # BLD AUTO: 4.26 X10*3/UL (ref 1.2–7.7)
NEUTROPHILS NFR BLD AUTO: 63.8 %
NRBC BLD-RTO: 0 /100 WBCS (ref 0–0)
PLATELET # BLD AUTO: 266 X10*3/UL (ref 150–450)
POTASSIUM SERPL-SCNC: 3.1 MMOL/L (ref 3.5–5.3)
PROT SERPL-MCNC: 7.3 G/DL (ref 6.4–8.2)
RBC # BLD AUTO: 5.06 X10*6/UL (ref 4–5.2)
SARS-COV-2 RNA RESP QL NAA+PROBE: NOT DETECTED
SODIUM SERPL-SCNC: 141 MMOL/L (ref 136–145)
WBC # BLD AUTO: 6.7 X10*3/UL (ref 4.4–11.3)

## 2024-04-12 PROCEDURE — 36415 COLL VENOUS BLD VENIPUNCTURE: CPT | Performed by: EMERGENCY MEDICINE

## 2024-04-12 PROCEDURE — 93005 ELECTROCARDIOGRAM TRACING: CPT

## 2024-04-12 PROCEDURE — 99285 EMERGENCY DEPT VISIT HI MDM: CPT | Performed by: EMERGENCY MEDICINE

## 2024-04-12 PROCEDURE — 99223 1ST HOSP IP/OBS HIGH 75: CPT | Performed by: INTERNAL MEDICINE

## 2024-04-12 PROCEDURE — 87636 SARSCOV2 & INF A&B AMP PRB: CPT

## 2024-04-12 PROCEDURE — 96360 HYDRATION IV INFUSION INIT: CPT | Mod: 59 | Performed by: INTERNAL MEDICINE

## 2024-04-12 PROCEDURE — 84075 ASSAY ALKALINE PHOSPHATASE: CPT | Performed by: EMERGENCY MEDICINE

## 2024-04-12 PROCEDURE — 83880 ASSAY OF NATRIURETIC PEPTIDE: CPT

## 2024-04-12 PROCEDURE — 2500000001 HC RX 250 WO HCPCS SELF ADMINISTERED DRUGS (ALT 637 FOR MEDICARE OP)

## 2024-04-12 PROCEDURE — 84484 ASSAY OF TROPONIN QUANT: CPT | Performed by: EMERGENCY MEDICINE

## 2024-04-12 PROCEDURE — 96361 HYDRATE IV INFUSION ADD-ON: CPT | Performed by: INTERNAL MEDICINE

## 2024-04-12 PROCEDURE — 99285 EMERGENCY DEPT VISIT HI MDM: CPT | Mod: 25

## 2024-04-12 PROCEDURE — 71045 X-RAY EXAM CHEST 1 VIEW: CPT

## 2024-04-12 PROCEDURE — 93010 ELECTROCARDIOGRAM REPORT: CPT | Performed by: EMERGENCY MEDICINE

## 2024-04-12 PROCEDURE — 83735 ASSAY OF MAGNESIUM: CPT | Performed by: EMERGENCY MEDICINE

## 2024-04-12 PROCEDURE — 2500000004 HC RX 250 GENERAL PHARMACY W/ HCPCS (ALT 636 FOR OP/ED)

## 2024-04-12 PROCEDURE — 85025 COMPLETE CBC W/AUTO DIFF WBC: CPT | Performed by: EMERGENCY MEDICINE

## 2024-04-12 PROCEDURE — 71045 X-RAY EXAM CHEST 1 VIEW: CPT | Performed by: RADIOLOGY

## 2024-04-12 RX ORDER — POTASSIUM CHLORIDE 1.5 G/1.58G
40 POWDER, FOR SOLUTION ORAL 2 TIMES DAILY
Status: DISCONTINUED | OUTPATIENT
Start: 2024-04-12 | End: 2024-04-13

## 2024-04-12 RX ADMIN — POTASSIUM CHLORIDE 40 MEQ: 1.5 POWDER, FOR SOLUTION ORAL at 21:06

## 2024-04-12 RX ADMIN — SODIUM CHLORIDE 1000 ML: 9 INJECTION, SOLUTION INTRAVENOUS at 21:06

## 2024-04-12 ASSESSMENT — LIFESTYLE VARIABLES
HAVE PEOPLE ANNOYED YOU BY CRITICIZING YOUR DRINKING: NO
EVER HAD A DRINK FIRST THING IN THE MORNING TO STEADY YOUR NERVES TO GET RID OF A HANGOVER: NO
TOTAL SCORE: 0
EVER FELT BAD OR GUILTY ABOUT YOUR DRINKING: NO
HAVE YOU EVER FELT YOU SHOULD CUT DOWN ON YOUR DRINKING: NO

## 2024-04-12 ASSESSMENT — COLUMBIA-SUICIDE SEVERITY RATING SCALE - C-SSRS
6. HAVE YOU EVER DONE ANYTHING, STARTED TO DO ANYTHING, OR PREPARED TO DO ANYTHING TO END YOUR LIFE?: NO
2. HAVE YOU ACTUALLY HAD ANY THOUGHTS OF KILLING YOURSELF?: NO
1. IN THE PAST MONTH, HAVE YOU WISHED YOU WERE DEAD OR WISHED YOU COULD GO TO SLEEP AND NOT WAKE UP?: NO

## 2024-04-12 ASSESSMENT — PAIN SCALES - GENERAL
PAINLEVEL_OUTOF10: 0 - NO PAIN

## 2024-04-12 ASSESSMENT — PAIN - FUNCTIONAL ASSESSMENT: PAIN_FUNCTIONAL_ASSESSMENT: 0-10

## 2024-04-12 NOTE — ED NOTES
1848--EKG obtained which showed concern for STEMI, pt brought back to room 14 at this time, EKG given to Dr. Aburto.  No stemi page at this time, awaiting cardiologist input     Missy Hutchinson, TOPHER  04/12/24 8383

## 2024-04-13 ENCOUNTER — APPOINTMENT (OUTPATIENT)
Dept: CARDIOLOGY | Facility: HOSPITAL | Age: 69
End: 2024-04-13
Payer: COMMERCIAL

## 2024-04-13 VITALS
OXYGEN SATURATION: 98 % | SYSTOLIC BLOOD PRESSURE: 147 MMHG | WEIGHT: 115 LBS | DIASTOLIC BLOOD PRESSURE: 72 MMHG | HEART RATE: 76 BPM | TEMPERATURE: 97.7 F | RESPIRATION RATE: 15 BRPM | HEIGHT: 60 IN | BODY MASS INDEX: 22.58 KG/M2

## 2024-04-13 LAB
ALBUMIN SERPL BCP-MCNC: 3.5 G/DL (ref 3.4–5)
ALP SERPL-CCNC: 46 U/L (ref 33–136)
ALT SERPL W P-5'-P-CCNC: 13 U/L (ref 7–45)
ANION GAP SERPL CALC-SCNC: 11 MMOL/L (ref 10–20)
AST SERPL W P-5'-P-CCNC: 17 U/L (ref 9–39)
BILIRUB SERPL-MCNC: 0.7 MG/DL (ref 0–1.2)
BUN SERPL-MCNC: 15 MG/DL (ref 6–23)
CALCIUM SERPL-MCNC: 7.8 MG/DL (ref 8.6–10.3)
CHLORIDE SERPL-SCNC: 111 MMOL/L (ref 98–107)
CO2 SERPL-SCNC: 24 MMOL/L (ref 21–32)
CREAT SERPL-MCNC: 0.58 MG/DL (ref 0.5–1.05)
EGFRCR SERPLBLD CKD-EPI 2021: >90 ML/MIN/1.73M*2
EJECTION FRACTION APICAL 4 CHAMBER: 54.1
ERYTHROCYTE [DISTWIDTH] IN BLOOD BY AUTOMATED COUNT: 11.9 % (ref 11.5–14.5)
GLUCOSE SERPL-MCNC: 79 MG/DL (ref 74–99)
HCT VFR BLD AUTO: 39.7 % (ref 36–46)
HGB BLD-MCNC: 12.5 G/DL (ref 12–16)
LEFT VENTRICLE INTERNAL DIMENSION DIASTOLE: 2.8 CM (ref 3.5–6)
LV EJECTION FRACTION BIPLANE: 56 %
MCH RBC QN AUTO: 29.7 PG (ref 26–34)
MCHC RBC AUTO-ENTMCNC: 31.5 G/DL (ref 32–36)
MCV RBC AUTO: 94 FL (ref 80–100)
MITRAL VALVE E/A RATIO: 0.77
MITRAL VALVE E/E' RATIO: 8.26
NRBC BLD-RTO: 0 /100 WBCS (ref 0–0)
PLATELET # BLD AUTO: 210 X10*3/UL (ref 150–450)
POTASSIUM SERPL-SCNC: 3.8 MMOL/L (ref 3.5–5.3)
PROT SERPL-MCNC: 5.6 G/DL (ref 6.4–8.2)
RBC # BLD AUTO: 4.21 X10*6/UL (ref 4–5.2)
RIGHT VENTRICLE PEAK SYSTOLIC PRESSURE: 25.3 MMHG
SODIUM SERPL-SCNC: 142 MMOL/L (ref 136–145)
WBC # BLD AUTO: 4.8 X10*3/UL (ref 4.4–11.3)

## 2024-04-13 PROCEDURE — G0378 HOSPITAL OBSERVATION PER HR: HCPCS

## 2024-04-13 PROCEDURE — 84075 ASSAY ALKALINE PHOSPHATASE: CPT | Performed by: INTERNAL MEDICINE

## 2024-04-13 PROCEDURE — 36415 COLL VENOUS BLD VENIPUNCTURE: CPT | Performed by: INTERNAL MEDICINE

## 2024-04-13 PROCEDURE — 93325 DOPPLER ECHO COLOR FLOW MAPG: CPT

## 2024-04-13 PROCEDURE — 2500000001 HC RX 250 WO HCPCS SELF ADMINISTERED DRUGS (ALT 637 FOR MEDICARE OP): Performed by: INTERNAL MEDICINE

## 2024-04-13 PROCEDURE — 2500000004 HC RX 250 GENERAL PHARMACY W/ HCPCS (ALT 636 FOR OP/ED): Performed by: INTERNAL MEDICINE

## 2024-04-13 PROCEDURE — 96361 HYDRATE IV INFUSION ADD-ON: CPT | Performed by: INTERNAL MEDICINE

## 2024-04-13 PROCEDURE — 93321 DOPPLER ECHO F-UP/LMTD STD: CPT | Performed by: INTERNAL MEDICINE

## 2024-04-13 PROCEDURE — 99239 HOSP IP/OBS DSCHRG MGMT >30: CPT | Performed by: INTERNAL MEDICINE

## 2024-04-13 PROCEDURE — 93308 TTE F-UP OR LMTD: CPT | Performed by: INTERNAL MEDICINE

## 2024-04-13 PROCEDURE — 96374 THER/PROPH/DIAG INJ IV PUSH: CPT | Mod: 59 | Performed by: INTERNAL MEDICINE

## 2024-04-13 PROCEDURE — C9113 INJ PANTOPRAZOLE SODIUM, VIA: HCPCS | Performed by: INTERNAL MEDICINE

## 2024-04-13 PROCEDURE — 99222 1ST HOSP IP/OBS MODERATE 55: CPT | Performed by: INTERNAL MEDICINE

## 2024-04-13 PROCEDURE — 93325 DOPPLER ECHO COLOR FLOW MAPG: CPT | Performed by: INTERNAL MEDICINE

## 2024-04-13 PROCEDURE — 85027 COMPLETE CBC AUTOMATED: CPT | Performed by: INTERNAL MEDICINE

## 2024-04-13 RX ORDER — ACETAMINOPHEN 160 MG/5ML
650 SOLUTION ORAL EVERY 4 HOURS PRN
Status: DISCONTINUED | OUTPATIENT
Start: 2024-04-13 | End: 2024-04-13 | Stop reason: HOSPADM

## 2024-04-13 RX ORDER — ONDANSETRON HYDROCHLORIDE 2 MG/ML
4 INJECTION, SOLUTION INTRAVENOUS EVERY 8 HOURS PRN
Status: DISCONTINUED | OUTPATIENT
Start: 2024-04-13 | End: 2024-04-13 | Stop reason: HOSPADM

## 2024-04-13 RX ORDER — PANTOPRAZOLE SODIUM 40 MG/10ML
40 INJECTION, POWDER, LYOPHILIZED, FOR SOLUTION INTRAVENOUS
Status: DISCONTINUED | OUTPATIENT
Start: 2024-04-13 | End: 2024-04-13 | Stop reason: HOSPADM

## 2024-04-13 RX ORDER — METOPROLOL SUCCINATE 50 MG/1
50 TABLET, EXTENDED RELEASE ORAL DAILY
Status: DISCONTINUED | OUTPATIENT
Start: 2024-04-13 | End: 2024-04-13 | Stop reason: HOSPADM

## 2024-04-13 RX ORDER — ACETAMINOPHEN 650 MG/1
650 SUPPOSITORY RECTAL EVERY 4 HOURS PRN
Status: DISCONTINUED | OUTPATIENT
Start: 2024-04-13 | End: 2024-04-13 | Stop reason: HOSPADM

## 2024-04-13 RX ORDER — ATORVASTATIN CALCIUM 40 MG/1
40 TABLET, FILM COATED ORAL NIGHTLY
Status: DISCONTINUED | OUTPATIENT
Start: 2024-04-13 | End: 2024-04-13 | Stop reason: HOSPADM

## 2024-04-13 RX ORDER — NAPROXEN SODIUM 220 MG/1
81 TABLET, FILM COATED ORAL DAILY
Status: DISCONTINUED | OUTPATIENT
Start: 2024-04-13 | End: 2024-04-13 | Stop reason: HOSPADM

## 2024-04-13 RX ORDER — POLYETHYLENE GLYCOL 3350 17 G/17G
17 POWDER, FOR SOLUTION ORAL DAILY PRN
Status: DISCONTINUED | OUTPATIENT
Start: 2024-04-13 | End: 2024-04-13 | Stop reason: HOSPADM

## 2024-04-13 RX ORDER — LISINOPRIL 10 MG/1
10 TABLET ORAL DAILY
Status: DISCONTINUED | OUTPATIENT
Start: 2024-04-13 | End: 2024-04-13 | Stop reason: HOSPADM

## 2024-04-13 RX ORDER — GUAIFENESIN AND PHENYLEPHRINE HCL 400; 10 MG/1; MG/1
1 TABLET ORAL 2 TIMES DAILY
COMMUNITY

## 2024-04-13 RX ORDER — POTASSIUM &MAGNESIUM ASPARTATE 250-250 MG
1 CAPSULE ORAL DAILY
COMMUNITY

## 2024-04-13 RX ORDER — PANTOPRAZOLE SODIUM 40 MG/1
40 TABLET, DELAYED RELEASE ORAL
Status: DISCONTINUED | OUTPATIENT
Start: 2024-04-13 | End: 2024-04-13 | Stop reason: HOSPADM

## 2024-04-13 RX ORDER — CALCIUM CARBONATE 500(1250)
1 TABLET ORAL DAILY
COMMUNITY

## 2024-04-13 RX ORDER — SODIUM CHLORIDE 9 MG/ML
75 INJECTION, SOLUTION INTRAVENOUS CONTINUOUS
Status: DISCONTINUED | OUTPATIENT
Start: 2024-04-13 | End: 2024-04-13

## 2024-04-13 RX ORDER — AMITRIPTYLINE HYDROCHLORIDE 10 MG/1
10 TABLET, FILM COATED ORAL NIGHTLY
Status: DISCONTINUED | OUTPATIENT
Start: 2024-04-13 | End: 2024-04-13 | Stop reason: HOSPADM

## 2024-04-13 RX ORDER — ONDANSETRON 4 MG/1
4 TABLET, ORALLY DISINTEGRATING ORAL EVERY 8 HOURS PRN
Status: DISCONTINUED | OUTPATIENT
Start: 2024-04-13 | End: 2024-04-13 | Stop reason: HOSPADM

## 2024-04-13 RX ORDER — TALC
3 POWDER (GRAM) TOPICAL NIGHTLY PRN
Status: DISCONTINUED | OUTPATIENT
Start: 2024-04-13 | End: 2024-04-13 | Stop reason: HOSPADM

## 2024-04-13 RX ORDER — MULTIVIT WITH IRON,MINERALS
1 TABLET,CHEWABLE ORAL 2 TIMES DAILY
COMMUNITY

## 2024-04-13 RX ORDER — ACETAMINOPHEN 325 MG/1
650 TABLET ORAL EVERY 4 HOURS PRN
Status: DISCONTINUED | OUTPATIENT
Start: 2024-04-13 | End: 2024-04-13 | Stop reason: HOSPADM

## 2024-04-13 RX ORDER — METOCLOPRAMIDE HYDROCHLORIDE 5 MG/ML
10 INJECTION INTRAMUSCULAR; INTRAVENOUS EVERY 6 HOURS PRN
Status: DISCONTINUED | OUTPATIENT
Start: 2024-04-13 | End: 2024-04-13 | Stop reason: HOSPADM

## 2024-04-13 RX ORDER — LEFLUNOMIDE 10 MG/1
10 TABLET ORAL DAILY
Status: DISCONTINUED | OUTPATIENT
Start: 2024-04-13 | End: 2024-04-13 | Stop reason: HOSPADM

## 2024-04-13 RX ORDER — METOCLOPRAMIDE 10 MG/1
10 TABLET ORAL EVERY 6 HOURS PRN
Status: DISCONTINUED | OUTPATIENT
Start: 2024-04-13 | End: 2024-04-13 | Stop reason: HOSPADM

## 2024-04-13 RX ORDER — MULTIVIT-MIN/IRON FUM/FOLIC AC 7.5 MG-4
0.5 TABLET ORAL DAILY
COMMUNITY

## 2024-04-13 RX ORDER — HYDROXYCHLOROQUINE SULFATE 200 MG/1
200 TABLET, FILM COATED ORAL DAILY
Status: DISCONTINUED | OUTPATIENT
Start: 2024-04-13 | End: 2024-04-13 | Stop reason: HOSPADM

## 2024-04-13 RX ADMIN — ASPIRIN 81 MG: 81 TABLET, CHEWABLE ORAL at 09:26

## 2024-04-13 RX ADMIN — SODIUM CHLORIDE 75 ML/HR: 9 INJECTION, SOLUTION INTRAVENOUS at 00:33

## 2024-04-13 RX ADMIN — PANTOPRAZOLE SODIUM 40 MG: 40 INJECTION, POWDER, FOR SOLUTION INTRAVENOUS at 05:05

## 2024-04-13 RX ADMIN — METOPROLOL SUCCINATE 50 MG: 50 TABLET, EXTENDED RELEASE ORAL at 12:49

## 2024-04-13 RX ADMIN — HYDROXYCHLOROQUINE SULFATE 200 MG: 200 TABLET, FILM COATED ORAL at 12:49

## 2024-04-13 RX ADMIN — LISINOPRIL 10 MG: 10 TABLET ORAL at 12:49

## 2024-04-13 RX ADMIN — LEFLUNOMIDE 10 MG: 10 TABLET ORAL at 12:49

## 2024-04-13 SDOH — SOCIAL STABILITY: SOCIAL INSECURITY: DO YOU FEEL ANYONE HAS EXPLOITED OR TAKEN ADVANTAGE OF YOU FINANCIALLY OR OF YOUR PERSONAL PROPERTY?: NO

## 2024-04-13 SDOH — SOCIAL STABILITY: SOCIAL INSECURITY: DOES ANYONE TRY TO KEEP YOU FROM HAVING/CONTACTING OTHER FRIENDS OR DOING THINGS OUTSIDE YOUR HOME?: NO

## 2024-04-13 SDOH — SOCIAL STABILITY: SOCIAL INSECURITY: ABUSE: ADULT

## 2024-04-13 SDOH — SOCIAL STABILITY: SOCIAL INSECURITY: DO YOU FEEL UNSAFE GOING BACK TO THE PLACE WHERE YOU ARE LIVING?: NO

## 2024-04-13 SDOH — SOCIAL STABILITY: SOCIAL INSECURITY: ARE THERE ANY APPARENT SIGNS OF INJURIES/BEHAVIORS THAT COULD BE RELATED TO ABUSE/NEGLECT?: NO

## 2024-04-13 SDOH — SOCIAL STABILITY: SOCIAL INSECURITY: ARE YOU OR HAVE YOU BEEN THREATENED OR ABUSED PHYSICALLY, EMOTIONALLY, OR SEXUALLY BY ANYONE?: NO

## 2024-04-13 SDOH — SOCIAL STABILITY: SOCIAL INSECURITY: HAS ANYONE EVER THREATENED TO HURT YOUR FAMILY OR YOUR PETS?: NO

## 2024-04-13 SDOH — SOCIAL STABILITY: SOCIAL INSECURITY: HAVE YOU HAD THOUGHTS OF HARMING ANYONE ELSE?: NO

## 2024-04-13 SDOH — SOCIAL STABILITY: SOCIAL INSECURITY: WERE YOU ABLE TO COMPLETE ALL THE BEHAVIORAL HEALTH SCREENINGS?: YES

## 2024-04-13 ASSESSMENT — COGNITIVE AND FUNCTIONAL STATUS - GENERAL
CLIMB 3 TO 5 STEPS WITH RAILING: A LITTLE
MOBILITY SCORE: 23
DAILY ACTIVITIY SCORE: 24
PATIENT BASELINE BEDBOUND: NO

## 2024-04-13 ASSESSMENT — PATIENT HEALTH QUESTIONNAIRE - PHQ9
SUM OF ALL RESPONSES TO PHQ9 QUESTIONS 1 & 2: 0
1. LITTLE INTEREST OR PLEASURE IN DOING THINGS: NOT AT ALL
2. FEELING DOWN, DEPRESSED OR HOPELESS: NOT AT ALL

## 2024-04-13 ASSESSMENT — ACTIVITIES OF DAILY LIVING (ADL)
DRESSING YOURSELF: INDEPENDENT
LACK_OF_TRANSPORTATION: NO
PATIENT'S MEMORY ADEQUATE TO SAFELY COMPLETE DAILY ACTIVITIES?: YES
WALKS IN HOME: INDEPENDENT
GROOMING: INDEPENDENT
TOILETING: INDEPENDENT
JUDGMENT_ADEQUATE_SAFELY_COMPLETE_DAILY_ACTIVITIES: YES
BATHING: INDEPENDENT
HEARING - RIGHT EAR: FUNCTIONAL
HEARING - LEFT EAR: FUNCTIONAL
ASSISTIVE_DEVICE: DENTURES UPPER;DENTURES LOWER;EYEGLASSES
FEEDING YOURSELF: INDEPENDENT
ADEQUATE_TO_COMPLETE_ADL: YES

## 2024-04-13 ASSESSMENT — LIFESTYLE VARIABLES
SKIP TO QUESTIONS 9-10: 1
AUDIT-C TOTAL SCORE: 0
SUBSTANCE_ABUSE_PAST_12_MONTHS: NO
HOW OFTEN DO YOU HAVE A DRINK CONTAINING ALCOHOL: NEVER
PRESCIPTION_ABUSE_PAST_12_MONTHS: NO
AUDIT-C TOTAL SCORE: 0
HOW MANY STANDARD DRINKS CONTAINING ALCOHOL DO YOU HAVE ON A TYPICAL DAY: PATIENT DOES NOT DRINK
HOW OFTEN DO YOU HAVE 6 OR MORE DRINKS ON ONE OCCASION: NEVER

## 2024-04-13 ASSESSMENT — PAIN SCALES - GENERAL: PAINLEVEL_OUTOF10: 0 - NO PAIN

## 2024-04-13 NOTE — ED PROVIDER NOTES
EMERGENCY DEPARTMENT ENCOUNTER      Pt Name: Danielle Deshpande  MRN: [id number]  Birthdate 1955  Date of evaluation: 4/12/2024    HISTORY OF PRESENT ILLNESS    Danielle Deshpande is an 69 y.o. female with history including WPW syndrome, hypertension, mitral regurg, osteoporosis, rheumatoid arthritis, paroxysmal atrial fibrillation presenting to the emergency department for shortness of breath with mild chest tightness.  Patient states that for the last 2 days she has had a sensation of shortness of breath.  She denies any fevers or chills.  She states that she has had some intermittent chest tightness with it but no chest pressure or pain.  Patient states that in the past she has had palpitations that were eventually diagnosed into WPW.  Patient follows up with Dr. Dalton.  She does not have a cardiologist at this time.      PAST MEDICAL HISTORY     Past Medical History:   Diagnosis Date    Acute recurrent sinusitis, unspecified 02/04/2022    Acute recurrent sinusitis, unspecified location    Arthritis 2010    Colon polyp 1986    Heart disease     WPW    Hypertension 1993    Other signs and symptoms in breast 06/11/2014    Inversion of nipple    Personal history of other diseases of the circulatory system 04/02/2014    History of hypertension    Personal history of other diseases of the female genital tract 08/14/2020    History of postmenopausal bleeding    Personal history of other diseases of the female genital tract 01/18/2022    History of vaginitis    Personal history of other diseases of the musculoskeletal system and connective tissue 04/02/2014    Personal history of scoliosis    Personal history of other specified conditions 06/11/2014    History of lump of right breast    Personal history of other specified conditions 09/01/2020    History of fatigue    PONV (postoperative nausea and vomiting) 1980    Unspecified hydronephrosis 09/01/2020    Hydronephrosis of right kidney       SURGICAL HISTORY        Past Surgical History:   Procedure Laterality Date    BACK SURGERY  2014    Back Surgery    BREAST BIOPSY  2014    Biopsy Breast Open     SECTION, LOW TRANSVERSE  1991    COLONOSCOPY  2014    Colonoscopy (Fiberoptic)    OTHER SURGICAL HISTORY  2022    Percutaneous nephrolithotomy    OTHER SURGICAL HISTORY  2022    Renal lithotripsy       CURRENT MEDICATIONS       Discharge Medication List as of 2024  3:03 PM        CONTINUE these medications which have NOT CHANGED    Details   alendronate (Fosamax) 70 mg tablet Take 1 tablet (70 mg) by mouth every 7 days. Take on an empty stomach. Do not lie down or eat for 1/2 hour after taking., Starting 2024, Until 2025, Normal      amitriptyline (Elavil) 10 mg tablet Take 1 tablet (10 mg) by mouth once daily at bedtime., Starting 2024, Until 2025, Normal      biotin 5,000 mcg tablet,disintegrating Take 1 tablet daily., Historical Med      calcium carbonate (Oscal) 500 mg calcium (1,250 mg) tablet Take 1 tablet (1,250 mg) by mouth once daily., Historical Med      cholecalciferol (Vitamin D-3) 50 mcg (2,000 unit) capsule Take 1 capsule daily, Historical Med      cranberry 500 mg capsule Take 1 capsule by mouth once daily., Historical Med      docusate sodium (Colace) 100 mg capsule Take 1 capsule (100 mg) by mouth once daily., Historical Med      estradiol (Estrace) 0.01 % (0.1 mg/gram) vaginal cream Insert 1 Applicatorful (4 g) into the vagina 2 times a week., Historical Med      glucosamine-chondroitin (Osteo Bi-Flex) 250-200 mg tablet Take 1 tablet by mouth 2 times a day., Historical Med      hydroxychloroquine (Plaquenil) 200 mg tablet Take 1 tablet (200 mg) by mouth once daily., Starting 2024, Until Sun 2024, Normal      leflunomide (Arava) 10 mg tablet Take 1 tablet (10 mg) by mouth once daily. as directed, Starting e 2024, Until Sun 2024, Normal      lisinopril 10 mg  tablet Take 1 tablet (10 mg) by mouth once daily., Starting Thu 9/7/2023, Until Fri 9/6/2024, Normal      magnesium glycinate 100 mg tablet Take 1 tablet by mouth once daily., Historical Med      metoprolol succinate XL (Toprol-XL) 50 mg 24 hr tablet Take 1 tablet (50 mg) by mouth once daily. Do not crush or chew., Starting Thu 9/7/2023, Until Fri 9/6/2024, Normal      multivitamin with minerals tablet Take 0.5 tablets by mouth once daily., Historical Med      turmeric root extract 500 mg capsule Take 1 capsule by mouth 2 times a day., Historical Med             ALLERGIES     Diphenhydramine hcl, Levaquin [levofloxacin], and Novocain [procaine]    FAMILY HISTORY       Family History   Problem Relation Name Age of Onset    Other (Non-Hodkins Lymphoma) Mother carmen Osborn     Thyroid disease Mother carmen Osborn     Hypertension Mother carmen Osborn     Emphysema Father      Cancer Father          Urinary Bladder    Stomach cancer Mother's Brother Ortiz Suh         SOCIAL HISTORY       Social History     Socioeconomic History    Marital status:      Spouse name: None    Number of children: None    Years of education: None    Highest education level: None   Occupational History    None   Tobacco Use    Smoking status: Never    Smokeless tobacco: Never   Vaping Use    Vaping status: Never Used   Substance and Sexual Activity    Alcohol use: Not Currently    Drug use: Never    Sexual activity: Not Currently     Partners: Male     Birth control/protection: Post-menopausal   Other Topics Concern    None   Social History Narrative    None     Social Determinants of Health     Financial Resource Strain: Low Risk  (4/13/2024)    Overall Financial Resource Strain (CARDIA)     Difficulty of Paying Living Expenses: Not very hard   Food Insecurity: Not on file   Transportation Needs: No Transportation Needs (4/13/2024)    PRAPARE - Transportation     Lack of Transportation (Medical): No     Lack of Transportation  (Non-Medical): No   Physical Activity: Not on file   Stress: Not on file   Social Connections: Not on file   Intimate Partner Violence: Not on file   Housing Stability: Low Risk  (4/13/2024)    Housing Stability Vital Sign     Unable to Pay for Housing in the Last Year: No     Number of Places Lived in the Last Year: 1     Unstable Housing in the Last Year: No       PHYSICAL EXAM       ED Triage Vitals [04/12/24 1900]   Temperature Heart Rate Respirations BP   36.3 °C (97.3 °F) (!) 104 16 (!) 188/94      Pulse Ox Temp Source Heart Rate Source Patient Position   98 % Temporal Monitor Sitting      BP Location FiO2 (%)     Right arm --       Physical Exam     DIAGNOSTIC RESULTS     LABS:  Labs Reviewed   CBC WITH AUTO DIFFERENTIAL - Abnormal       Result Value    WBC 6.7      nRBC 0.0      RBC 5.06      Hemoglobin 14.8      Hematocrit 46.7 (*)     MCV 92      MCH 29.2      MCHC 31.7 (*)     RDW 11.9      Platelets 266      Neutrophils % 63.8      Immature Granulocytes %, Automated 0.1      Lymphocytes % 25.0      Monocytes % 7.2      Eosinophils % 2.7      Basophils % 1.2      Neutrophils Absolute 4.26      Immature Granulocytes Absolute, Automated 0.01      Lymphocytes Absolute 1.67      Monocytes Absolute 0.48      Eosinophils Absolute 0.18      Basophils Absolute 0.08     COMPREHENSIVE METABOLIC PANEL - Abnormal    Glucose 121 (*)     Sodium 141      Potassium 3.1 (*)     Chloride 105      Bicarbonate 27      Anion Gap 12      Urea Nitrogen 22      Creatinine 0.81      eGFR 79      Calcium 9.3      Albumin 4.6      Alkaline Phosphatase 76      Total Protein 7.3      AST 21      Bilirubin, Total 0.6      ALT 16     CBC - Abnormal    WBC 4.8      nRBC 0.0      RBC 4.21      Hemoglobin 12.5      Hematocrit 39.7      MCV 94      MCH 29.7      MCHC 31.5 (*)     RDW 11.9      Platelets 210     COMPREHENSIVE METABOLIC PANEL - Abnormal    Glucose 79      Sodium 142      Potassium 3.8      Chloride 111 (*)     Bicarbonate 24       Anion Gap 11      Urea Nitrogen 15      Creatinine 0.58      eGFR >90      Calcium 7.8 (*)     Albumin 3.5      Alkaline Phosphatase 46      Total Protein 5.6 (*)     AST 17      Bilirubin, Total 0.7      ALT 13     MAGNESIUM - Normal    Magnesium 2.05     SERIAL TROPONIN-INITIAL - Normal    Troponin I, High Sensitivity 7      Narrative:     Less than 99th percentile of normal range cutoff-  Female and children under 18 years old <14 ng/L; Male <21 ng/L: Negative  Repeat testing should be performed if clinically indicated.     Female and children under 18 years old 14-50 ng/L; Male 21-50 ng/L:  Consistent with possible cardiac damage and possible increased clinical   risk. Serial measurements may help to assess extent of myocardial damage.     >50 ng/L: Consistent with cardiac damage, increased clinical risk and  myocardial infarction. Serial measurements may help assess extent of   myocardial damage.      NOTE: Children less than 1 year old may have higher baseline troponin   levels and results should be interpreted in conjunction with the overall   clinical context.     NOTE: Troponin I testing is performed using a different   testing methodology at [facility] than at other   [facility]. Direct result comparisons should only   be made within the same method.   SERIAL TROPONIN, 1 HOUR - Normal    Troponin I, High Sensitivity 7      Narrative:     Less than 99th percentile of normal range cutoff-  Female and children under 18 years old <14 ng/L; Male <21 ng/L: Negative  Repeat testing should be performed if clinically indicated.     Female and children under 18 years old 14-50 ng/L; Male 21-50 ng/L:  Consistent with possible cardiac damage and possible increased clinical   risk. Serial measurements may help to assess extent of myocardial damage.     >50 ng/L: Consistent with cardiac damage, increased clinical risk and  myocardial infarction. Serial measurements may help assess extent of    myocardial damage.      NOTE: Children less than 1 year old may have higher baseline troponin   levels and results should be interpreted in conjunction with the overall   clinical context.     NOTE: Troponin I testing is performed using a different   testing methodology at [facility] than at other   [facility]. Direct result comparisons should only   be made within the same method.   B-TYPE NATRIURETIC PEPTIDE - Normal    BNP 48      Narrative:        <100 pg/mL - Heart failure unlikely  100-299 pg/mL - Intermediate probability of acute heart                  failure exacerbation. Correlate with clinical                  context and patient history.    >=300 pg/mL - Heart Failure likely. Correlate with clinical                  context and patient history.    BNP testing is performed using different testing methodology at [facility] than at other [facility]. Direct result comparisons should only be made within the same method.      SARS-COV-2 AND INFLUENZA A/B PCR - Normal    Flu A Result Not Detected      Flu B Result Not Detected      Coronavirus 2019, PCR Not Detected      Narrative:     This assay has received FDA Emergency Use Authorization (EUA) and  is only authorized for the duration of time that circumstances exist to justify the authorization of the emergency use of in vitro diagnostic tests for the detection of SARS-CoV-2 virus and/or diagnosis of COVID-19 infection under section 564(b)(1) of the Act, 21 U.S.C. 360bbb-3(b)(1). Testing for SARS-CoV-2 is only recommended for patients who meet current clinical and/or epidemiological criteria as defined by federal, state, or local public health directives. This assay is an in vitro diagnostic nucleic acid amplification test for the qualitative detection of SARS-CoV-2, Influenza A, and Influenza B from nasopharyngeal specimens and has been validated for use at [facility]. Negative results do not  preclude COVID-19 infections or Influenza A/B infections, and should not be used as the sole basis for diagnosis, treatment, or other management decisions. If Influenza A/B and RSV PCR results are negative, testing for Parainfluenza virus, Adenovirus and Metapneumovirus is routinely performed for [facility] pediatric oncology and intensive care inpatients, and is available on other patients by placing an add-on request.    TROPONIN SERIES- (INITIAL, 1 HR)    Narrative:     The following orders were created for panel order Troponin I Series, High Sensitivity (0, 1 HR).  Procedure                               Abnormality         Status                     ---------                               -----------         ------                     Troponin I, High Sensiti...[[id number]]  Normal              Final result               Troponin, High Sensitivi...[[id number]]  Normal              Final result                 Please view results for these tests on the individual orders.       All other labs were within normal range or not returned as of this dictation.    Imaging  Transthoracic Echo (TTE) Limited   Final Result      XR chest 1 view   Final Result   1. Limited evaluation of the thorax without acute cardiopulmonary   process. If there is further concern, CT chest may be obtained.        MACRO:   None.        Signed by: Dinah Dawn 4/12/2024 7:13 PM   Dictation workstation:   [id number]           Procedures  Procedures     EMERGENCY DEPARTMENT COURSE/MDM:   Medical Decision Making  Danielle Deshpande is an 69 y.o. female with history including WPW syndrome, hypertension, mitral regurg, osteoporosis, rheumatoid arthritis, paroxysmal atrial fibrillation presenting to the emergency department for shortness of breath with mild chest tightness.  EKG was read by the machine as acute STEMI.  On my initial evaluation did not appear to have ST elevations or depressions.  Discussed the EKG with cardiologist Dr. Ballard.  He  reviewed the EKG and does not believe it to be an acute STEMI.  No STEMI alert was ordered.  He recommends a full cardiac workup in the meantime and admission with cardiologist follow-up.    Patient's lab workup reviewed troponin and delta troponin are both negative.  Patient's EKG is stable.  Patient is mildly hypertensive here.  Patient's flu and COVID were negative.  Chest x-ray showed no acute cardiopulmonary process but was limited.  We respoke to Dr. Alarcon who had spoken to Dr. Meléndez in the meantime.  Both cardiologist agree with patient being admitted for ACS rule out.  All this was discussed with patient.    At 1 point during the visit patient states that she felt some tremors in her hands.  When I went to go reassess her she appeared to be cold and stated as such.  I believe the tremors were secondary to chills.  Patient did have a mild hypokalemia and was given oral potassium.              External records reviewed: recent inpatient, clinic, and prior ED notes  Labs and Diagnostic imaging independently reviewed/interpreted by me.    Patient plan, care, lab results and imaging were all discussed with attending.    ED Medications administered this visit:    Medications   sodium chloride 0.9 % bolus 1,000 mL (0 mL intravenous Stopped 4/12/24 1242)     New Prescriptions from this visit:    Discharge Medication List as of 4/13/2024  3:03 PM          (Please note that portions of this note were completed with a voice recognition program.  Efforts were made to edit the dictations but occasionally words are mis-transcribed.)     Caitlyn Christopher DO  Resident  04/13/24 8411

## 2024-04-13 NOTE — NURSING NOTE
Patient educated on plan of holter monitor to be placed as outpatient. No availability for placement today, cardiology okay with patient being discharged and returning as an outpatient for monitor

## 2024-04-13 NOTE — NURSING NOTE
Patient arrived to the unit via wheelchair. Vitals taken, telemetry placed on the patient. Unit routine explained to the patient and her family.

## 2024-04-13 NOTE — DISCHARGE SUMMARY
Discharge Diagnosis  Acute coronary syndrome (Multi)    Issues Requiring Follow-Up  It was a pleasure to meet you in the hospital  You presented with having chest discomfort, although the symptoms were not concerning for a heart attack  Testing done in the blood and with EKGs as well as with imaging like chest Xray and ultrasound of the heart confirmed no active heart attack was going on  You had a heart catheterization done by Dr. Barnhart prior to him retiring; this showed no heart artery blockages at that time  You are safe to discharge to home, although you will need a 14 day event monitor; you may come back to the hospital on 4/15/24 and ask to go to [facility] to have this monitor put in place; this information will be useful for when you follow-up with Dr. Christofer Dalton in May 2024, as has been previously planned  You will also follow-up with Dr. David Meléndez, the cardiology physician you met in the hospital for consultation  Dr. Meléndez will explain a specialized CT scan of the chest that can be used instead of a stress test to check for potential blockages in the arteries that the heart uses to supply itself with blood; please call Dr. Meléndez's office and his staff will help you arrange this follow-up, information is listed above.  As you noted today, you have a follow-up appointment with your primacy care physician, Dr. Pizano; please keep this appointment as previously planned  None of your medications were changed or adjusted at this time  We did talk about keeping a Blood Pressure log over the next few weeks and bringing these values in with you to Dr. Pizano  You should check your BP around 12 or 1pm daily, at least an hour after taking your morning pills       Test Results Pending At Discharge  Pending Labs       No current pending labs.            Hospital Course  Danielle Deshpande is a 69 y.o. female presenting with anterior chest wall discomfort and associated dyspnea.  Patient has a known  history of Felton-Parkinson-White, but denies any other significant past cardiac history.  Denies associated symptoms including diaphoresis, nausea, or vomiting.  States that the pain that was present prompting evaluation has resolved at time of assessment.  Initial cardiac workup obtained upon arrival overall reassuring.  Initial EKG obtained was read as acute MI however follow-up EKG does not show this acute injury pattern.  Troponins obtained x 2 noted to be <7.  Case was discussed with cardiology service by ED physicians.  Patient admitted for further observation.     Patient was seen in conjunction with cardiology, Dr. Meléndez; a limited echo showed normal EF of 55-60%.  She will be discharged home with no medication changes, she will follow-up with Dr. David Meléndez in cardiology and will have stress testing or coronary CTA as part of her ongoing workup as an outpatient; she was given Rx for 2 week ambulatory monitor, she will also follow-up with her EP and PCP as previously planned; her BP was elevated on arrival to ED and it did improve with no intervention; she was instructed to keep a BP log and bring it with her to follow-up in the above noted instructions.    Greater than 30 minutes was spent facilitating this patients discharge from the hospital which included examining the patient, reconciling medications, and making arrangements for future care.    Pertinent Physical Exam At Time of Discharge  Physical Exam  Vitals reviewed.   Constitutional:       Appearance: Normal appearance.   HENT:      Head: Normocephalic and atraumatic.      Nose: Nose normal.      Mouth/Throat:      Mouth: Mucous membranes are moist.   Eyes:      Extraocular Movements: Extraocular movements intact.      Conjunctiva/sclera: Conjunctivae normal.      Pupils: Pupils are equal, round, and reactive to light.   Cardiovascular:      Rate and Rhythm: Normal rate and regular rhythm.      Pulses: Normal pulses.      Heart sounds: Normal  heart sounds.   Pulmonary:      Effort: Pulmonary effort is normal.      Breath sounds: Normal breath sounds.   Abdominal:      General: Bowel sounds are normal.      Palpations: Abdomen is soft.   Musculoskeletal:         General: Normal range of motion.      Cervical back: Normal range of motion and neck supple.   Skin:     General: Skin is warm and dry.   Neurological:      General: No focal deficit present.      Mental Status: She is alert. Mental status is at baseline.   Psychiatric:         Mood and Affect: Mood normal.         Behavior: Behavior normal.     Home Medications     Medication List      ASK your doctor about these medications     alendronate 70 mg tablet; Commonly known as: Fosamax; Take 1 tablet (70   mg) by mouth every 7 days. Take on an empty stomach. Do not lie down or   eat for 1/2 hour after taking.   amitriptyline 10 mg tablet; Commonly known as: Elavil; Take 1 tablet (10   mg) by mouth once daily at bedtime.   biotin 5,000 mcg tablet,disintegrating   calcium carbonate 500 mg calcium (1,250 mg) tablet; Commonly known as:   Oscal   cholecalciferol 50 mcg (2,000 unit) capsule; Commonly known as: Vitamin   D-3   cranberry 500 mg capsule   docusate sodium 100 mg capsule; Commonly known as: Colace   estradiol 0.01 % (0.1 mg/gram) vaginal cream; Commonly known as: Estrace   hydroxychloroquine 200 mg tablet; Commonly known as: Plaquenil; Take 1   tablet (200 mg) by mouth once daily.   leflunomide 10 mg tablet; Commonly known as: Arava; Take 1 tablet (10   mg) by mouth once daily. as directed   lisinopril 10 mg tablet; Take 1 tablet (10 mg) by mouth once daily.   magnesium glycinate 100 mg tablet   metoprolol succinate XL 50 mg 24 hr tablet; Commonly known as:   Toprol-XL; Take 1 tablet (50 mg) by mouth once daily. Do not crush or   chew.   multivitamin with minerals tablet   Osteo Bi-Flex 250-200 mg tablet; Generic drug: glucosamine-chondroitin;   Ask about: Which instructions should I use?    turmeric root extract 500 mg capsule       Outpatient Follow-Up  Future Appointments   Date Time Provider Department Center   5/6/2024  9:30 AM Laila Alonso MD [id number] [place]   5/10/2024  2:45 PM [facility] [id number] NEURODG EMG EQUIP 1 [id number] [place]   5/15/2024  8:00 AM Christofer Dalton MD [id number] [place]   5/28/2024  1:00 PM [place] Pizano DO [id number] [place]   7/2/2024  9:00 AM Silvia Camacho, APRN-CNP [id number] [place]       Bharath Marmolejo MD   1 Principal Discharge DX:	Acute costochondritis   Principal Discharge DX:	Acute costochondritis  Secondary Diagnosis:	2019 novel coronavirus disease (COVID-19)

## 2024-04-13 NOTE — H&P
History Of Present Illness  Danielle Deshpande is a 69 y.o. female presenting with anterior chest wall discomfort and associated dyspnea.  Patient has a known history of F[place]-Parkinson-White, but denies any other significant past cardiac history.  Denies associated symptoms including diaphoresis, nausea, or vomiting.  States that the pain that was present prompting evaluation has resolved at time of assessment.  Initial cardiac workup obtained upon arrival overall reassuring.  Initial EKG obtained was read as acute MI however follow-up EKG does not show this acute injury pattern.  Troponins obtained x 2 noted to be <7.  Case was discussed with cardiology service by ED physicians.  Patient admitted for further observation.     Past Medical History  Past Medical History:   Diagnosis Date    Acute recurrent sinusitis, unspecified 02/04/2022    Acute recurrent sinusitis, unspecified location    Arthritis 2010    Colon polyp 1986    Heart disease     WPW    Hypertension 1993    Other signs and symptoms in breast 06/11/2014    Inversion of nipple    Personal history of other diseases of the circulatory system 04/02/2014    History of hypertension    Personal history of other diseases of the female genital tract 08/14/2020    History of postmenopausal bleeding    Personal history of other diseases of the female genital tract 01/18/2022    History of vaginitis    Personal history of other diseases of the musculoskeletal system and connective tissue 04/02/2014    Personal history of scoliosis    Personal history of other specified conditions 06/11/2014    History of lump of right breast    Personal history of other specified conditions 09/01/2020    History of fatigue    PONV (postoperative nausea and vomiting) 1980    Unspecified hydronephrosis 09/01/2020    Hydronephrosis of right kidney       Surgical History  Past Surgical History:   Procedure Laterality Date    BACK SURGERY  04/02/2014    Back Surgery    BREAST BIOPSY   2014    Biopsy Breast Open     SECTION, LOW TRANSVERSE  1991    COLONOSCOPY  2014    Colonoscopy (Fiberoptic)    OTHER SURGICAL HISTORY  2022    Percutaneous nephrolithotomy    OTHER SURGICAL HISTORY  2022    Renal lithotripsy        Social History  She reports that she has never smoked. She has never used smokeless tobacco. She reports that she does not currently use alcohol. She reports that she does not use drugs.    Family History  Family History   Problem Relation Name Age of Onset    Other (Non-Hodkins Lymphoma) Mother carmen Osborn     Thyroid disease Mother carmen Osborn     Hypertension Mother carmen Osborn     Emphysema Father      Cancer Father          Urinary Bladder    Stomach cancer Mother's Brother Ortiz Suh         Allergies  Antihistamine [diphenhydramine hcl], Antihistamine-1, Levofloxacin, and Procaine    Review of Systems   All other systems reviewed and are negative.       Physical Exam  Vitals reviewed.   Constitutional:       Appearance: Normal appearance.   HENT:      Head: Normocephalic and atraumatic.      Nose: Nose normal.      Mouth/Throat:      Mouth: Mucous membranes are moist.   Eyes:      Extraocular Movements: Extraocular movements intact.      Conjunctiva/sclera: Conjunctivae normal.      Pupils: Pupils are equal, round, and reactive to light.   Cardiovascular:      Rate and Rhythm: Normal rate and regular rhythm.      Pulses: Normal pulses.      Heart sounds: Normal heart sounds.   Pulmonary:      Effort: Pulmonary effort is normal.      Breath sounds: Normal breath sounds.   Abdominal:      General: Bowel sounds are normal.      Palpations: Abdomen is soft.   Musculoskeletal:         General: Normal range of motion.      Cervical back: Normal range of motion and neck supple.   Skin:     General: Skin is warm and dry.   Neurological:      General: No focal deficit present.      Mental Status: She is alert. Mental status is at baseline.  "  Psychiatric:         Mood and Affect: Mood normal.         Behavior: Behavior normal.          Last Recorded Vitals  Blood pressure 164/80, pulse 75, temperature 36.3 °C (97.3 °F), temperature source Temporal, resp. rate 18, height 1.499 m (4' 11\"), weight 54.4 kg (120 lb), SpO2 97%.    Relevant Results  Scheduled medications  pantoprazole, 40 mg, oral, Daily before breakfast   Or  pantoprazole, 40 mg, intravenous, Daily before breakfast  potassium chloride, 40 mEq, oral, BID      Continuous medications  sodium chloride 0.9%, 75 mL/hr, Last Rate: 75 mL/hr (04/13/24 0433)      PRN medications  PRN medications: acetaminophen **OR** acetaminophen **OR** acetaminophen, acetaminophen **OR** acetaminophen **OR** acetaminophen, melatonin, metoclopramide **OR** metoclopramide, ondansetron ODT **OR** ondansetron, polyethylene glycol  XR chest 1 view    Result Date: 4/12/2024  Interpreted By:  Dinah Dawn, STUDY: Chest, single AP view.   INDICATION: Signs/Symptoms:Chest Pain.   COMPARISON: Chest radiograph 02/24/2023.   ACCESSION NUMBER(S): [id number]   ORDERING CLINICIAN: THALIA JACK   FINDINGS: Evaluation is limited due to presence of scoliosis and patient's rotated positioning.     The cardiac silhouette size is within normal limits. There is no focal consolidation, edema or pneumothorax. No sizeable pleural effusion. No acute osseous abnormality. Scoliosis fixation changes noted of the thoracolumbar spine with fusion hardware evident.       1. Limited evaluation of the thorax without acute cardiopulmonary process. If there is further concern, CT chest may be obtained.   MACRO: None.   Signed by: Dinah Dawn 4/12/2024 7:13 PM Dictation workstation:   [id number]   Results for orders placed or performed during the hospital encounter of 04/12/24 (from the past 24 hour(s))   CBC and Auto Differential   Result Value Ref Range    WBC 6.7 4.4 - 11.3 x10*3/uL    nRBC 0.0 0.0 - 0.0 /100 WBCs    RBC 5.06 4.00 - 5.20 " x10*6/uL    Hemoglobin 14.8 12.0 - 16.0 g/dL    Hematocrit 46.7 (H) 36.0 - 46.0 %    MCV 92 80 - 100 fL    MCH 29.2 26.0 - 34.0 pg    MCHC 31.7 (L) 32.0 - 36.0 g/dL    RDW 11.9 11.5 - 14.5 %    Platelets 266 150 - 450 x10*3/uL    Neutrophils % 63.8 40.0 - 80.0 %    Immature Granulocytes %, Automated 0.1 0.0 - 0.9 %    Lymphocytes % 25.0 13.0 - 44.0 %    Monocytes % 7.2 2.0 - 10.0 %    Eosinophils % 2.7 0.0 - 6.0 %    Basophils % 1.2 0.0 - 2.0 %    Neutrophils Absolute 4.26 1.20 - 7.70 x10*3/uL    Immature Granulocytes Absolute, Automated 0.01 0.00 - 0.70 x10*3/uL    Lymphocytes Absolute 1.67 1.20 - 4.80 x10*3/uL    Monocytes Absolute 0.48 0.10 - 1.00 x10*3/uL    Eosinophils Absolute 0.18 0.00 - 0.70 x10*3/uL    Basophils Absolute 0.08 0.00 - 0.10 x10*3/uL   Comprehensive Metabolic Panel   Result Value Ref Range    Glucose 121 (H) 74 - 99 mg/dL    Sodium 141 136 - 145 mmol/L    Potassium 3.1 (L) 3.5 - 5.3 mmol/L    Chloride 105 98 - 107 mmol/L    Bicarbonate 27 21 - 32 mmol/L    Anion Gap 12 10 - 20 mmol/L    Urea Nitrogen 22 6 - 23 mg/dL    Creatinine 0.81 0.50 - 1.05 mg/dL    eGFR 79 >60 mL/min/1.73m*2    Calcium 9.3 8.6 - 10.3 mg/dL    Albumin 4.6 3.4 - 5.0 g/dL    Alkaline Phosphatase 76 33 - 136 U/L    Total Protein 7.3 6.4 - 8.2 g/dL    AST 21 9 - 39 U/L    Bilirubin, Total 0.6 0.0 - 1.2 mg/dL    ALT 16 7 - 45 U/L   Magnesium   Result Value Ref Range    Magnesium 2.05 1.60 - 2.40 mg/dL   Troponin I, High Sensitivity, Initial   Result Value Ref Range    Troponin I, High Sensitivity 7 0 - 13 ng/L   B-type natriuretic peptide   Result Value Ref Range    BNP 48 0 - 99 pg/mL   Troponin, High Sensitivity, 1 Hour   Result Value Ref Range    Troponin I, High Sensitivity 7 0 - 13 ng/L   Sars-CoV-2 and Influenza A/B PCR   Result Value Ref Range    Flu A Result Not Detected Not Detected    Flu B Result Not Detected Not Detected    Coronavirus 2019, PCR Not Detected Not Detected          Assessment/Plan   Principal  Problem:    Acute coronary syndrome (Multi)  Active Problems:    Felton-Parkinson-White (WPW) syndrome    ACS (acute coronary syndrome) (Multi)      -Patient admitted for evaluation of progressive chest pain, ACS rule out  -Cardiology consultation has been placed, appreciate recommendations and management  -Patient has been given full dose aspirin.  Started on 81 mg of aspirin daily  -High intensity statin  -Maintain optimal cardiac electrolytes, potassium >4 and magnesium >2  -Maintain telemetry  -PT/OT, as indicated  -DVT prophylaxis as ordered     I spent >75 minutes in the professional and overall care of this patient.      Messi Phan, DO

## 2024-04-13 NOTE — CARE PLAN
The patient's goals for the shift include      The clinical goals for the shift include Complete all diagnostics

## 2024-04-13 NOTE — CONSULTS
Consults  Reason for Consult: Chest pain and palpitations     Assessment/Plan:    Chest pain: Symptoms are fairly atypical.  She has a chronically abnormal ECG.  Cardiac enzymes are normal.  I recommended a limited echocardiogram.  If this does not show any pericardial disease or LV dysfunction I think she can be safely discharged home.  Since her heart catheterization was several years ago it is likely beneficial to reassess for coronary disease with a stress test or coronary CTA.  I will probably set her up with a coronary CTA as an outpatient.  2.  WPW with palpitations: Patient has not met criteria for ablation in the past.  Continue beta-blocker.  Would recommend discharge home with 14-day Holter monitor and follow-up with Dr. Dalton in May as already planned.  3.  Disposition: If echocardiogram is unremarkable patient may be safely discharged home with the Holter monitor.    Peripheral IV 04/12/24 18 G Left Antecubital (Active)   Site Assessment Clean;Dry;Intact 04/13/24 0900   Dressing Status Clean;Dry 04/13/24 0900   Number of days: 1       Peripheral IV 04/12/24 20 G Right Antecubital (Active)   Site Assessment Clean;Dry;Intact 04/13/24 0900   Dressing Status Clean;Dry 04/13/24 0900   Number of days: 1           History Of Present Illness:    Danielle Deshpande is a 69 y.o. female presenting with atypical chest pain and palpitations.  Over the last several weeks patient has had a number of different chest symptoms.  She will occasionally feel some pressure in her chest but it was reproducible with leaning forward.  She had COVID in January and recovered from this.  She has a chronically abnormal ECG and there was a concern for STEMI last evening but cardiac enzymes are normal and she is currently pain-free.  She has a history of WPW and has had a increase in the frequency and duration of her palpitations.  She otherwise feels well now.  Telemetry shows no arrhythmias..       Past Medical History:  She has  a past medical history of Acute recurrent sinusitis, unspecified (2022), Arthritis (), Colon polyp (), Heart disease, Hypertension (), Other signs and symptoms in breast (2014), Personal history of other diseases of the circulatory system (2014), Personal history of other diseases of the female genital tract (2020), Personal history of other diseases of the female genital tract (2022), Personal history of other diseases of the musculoskeletal system and connective tissue (2014), Personal history of other specified conditions (2014), Personal history of other specified conditions (2020), PONV (postoperative nausea and vomiting) (), and Unspecified hydronephrosis (2020).    Past Surgical History:  She has a past surgical history that includes Colonoscopy (2014); Back surgery (2014); Other surgical history (2022); Other surgical history (2022); Breast biopsy (2014); and  section, low transverse ().    Problem List:  Patient Active Problem List   Diagnosis    Atrophy of vagina    Baker cyst, right    Benign essential hypertension    Cystocele with uterine prolapse    Dental caries extending into dentin    Kyphoscoliosis    Migraine    Mitral regurgitation    Mixed incontinence urge and stress    Nephrolithiasis    Osteoporosis, post-menopausal    Rheumatoid arthritis (Multi)    Spinal stenosis    Tricuspid regurgitation    Vitamin D deficiency    Felton-Parkinson-White (WPW) syndrome    Female bladder prolapse    BPPV (benign paroxysmal positional vertigo)    Hypercalciuria    Localized osteoarthrosis    Personal history of colonic polyps    Osteochondropathy    PAF (paroxysmal atrial fibrillation) (Multi)    Sensorineural hearing loss, bilateral    Neck pain    Back pain    ACS (acute coronary syndrome) (Multi)    Acute coronary syndrome (Multi)    Shortness of breath       Social History:  She reports that  she has never smoked. She has never used smokeless tobacco. She reports that she does not currently use alcohol. She reports that she does not use drugs.    Family History:  Family History   Problem Relation Name Age of Onset    Other (Non-Hodkins Lymphoma) Mother carmen Osborn     Thyroid disease Mother carmen Osborn     Hypertension Mother carmen Osborn     Emphysema Father      Cancer Father          Urinary Bladder    Stomach cancer Mother's Brother Ortiz Suh         Allergies:  Diphenhydramine hcl, Levaquin [levofloxacin], and Novocain [procaine]    Inpatient Medications:  Scheduled medications   Medication Dose Route Frequency    amitriptyline  10 mg oral Nightly    aspirin  81 mg oral Daily    atorvastatin  40 mg oral Nightly    hydroxychloroquine  200 mg oral Daily    leflunomide  10 mg oral Daily    lisinopril  10 mg oral Daily    metoprolol succinate XL  50 mg oral Daily    pantoprazole  40 mg oral Daily before breakfast    Or    pantoprazole  40 mg intravenous Daily before breakfast     PRN medications   Medication    acetaminophen    Or    acetaminophen    Or    acetaminophen    acetaminophen    Or    acetaminophen    Or    acetaminophen    melatonin    metoclopramide    Or    metoclopramide    ondansetron ODT    Or    ondansetron    polyethylene glycol     Continuous Medications   Medication Dose Last Rate    sodium chloride 0.9%  75 mL/hr 75 mL/hr (04/13/24 0433)     Outpatient Medications:  Current Outpatient Medications   Medication Instructions    alendronate (FOSAMAX) 70 mg, oral, Every 7 days, Take on an empty stomach. Do not lie down or eat for 1/2 hour after taking.    amitriptyline (ELAVIL) 10 mg, oral, Nightly    biotin 5,000 mcg tablet,disintegrating Take 1 tablet daily.    calcium carbonate (Oscal) 500 mg calcium (1,250 mg) tablet 1 tablet, oral, Daily    cholecalciferol (Vitamin D-3) 50 mcg (2,000 unit) capsule Take 1 capsule daily    cranberry 500 mg capsule 1 capsule, oral, Daily     "docusate sodium (Colace) 100 mg capsule Take 1 capsule (100 mg) by mouth once daily.    estradiol (Estrace) 0.01 % (0.1 mg/gram) vaginal cream Insert 1 Applicatorful (4 g) into the vagina 2 times a week.    glucosamine-chondroitin (Osteo Bi-Flex) 250-200 mg tablet 1 tablet, oral, 2 times daily    hydroxychloroquine (PLAQUENIL) 200 mg, oral, Daily    leflunomide (ARAVA) 10 mg, oral, Daily, as directed    lisinopril 10 mg, oral, Daily    magnesium glycinate 100 mg tablet 1 tablet, oral, Daily    metoprolol succinate XL (TOPROL-XL) 50 mg, oral, Daily, Do not crush or chew.    multivitamin with minerals tablet 0.5 tablets, oral, Daily    turmeric root extract 500 mg capsule 1 capsule, oral, 2 times daily       Last Recorded Vitals:  Vitals:    04/12/24 2317 04/12/24 2350 04/13/24 0400 04/13/24 0800   BP: 152/85 (!) 153/93 139/75 125/66   BP Location: Right arm Right arm Right arm Right arm   Patient Position: Sitting Lying Lying Lying   Pulse: 74 75 71 78   Resp: 18 20 16 15   Temp: 36.3 °C (97.3 °F) 36 °C (96.8 °F) 36.3 °C (97.3 °F) 36.2 °C (97.2 °F)   TempSrc: Temporal Temporal Temporal Temporal   SpO2: 98% 97% 96% 96%   Weight: 52.2 kg (115 lb)      Height: 1.511 m (4' 11.5\")        Last I/O:  I/O last 3 completed shifts:  In: 1300 (24.9 mL/kg) [I.V.:300 (5.8 mL/kg); IV Piggyback:1000]  Out: - (0 mL/kg)   Weight: 52.2 kg     Physical Exam  Vitals reviewed.   Constitutional:       Appearance: Normal appearance.   Eyes:      Pupils: Pupils are equal, round, and reactive to light.   Neck:      Vascular: No JVD.   Cardiovascular:      Rate and Rhythm: Normal rate and regular rhythm.      Pulses: Normal pulses.      Heart sounds: No murmur heard.     No gallop.   Pulmonary:      Effort: No respiratory distress.      Breath sounds: No wheezing or rales.   Abdominal:      General: Abdomen is flat. There is no distension.      Palpations: Abdomen is soft.   Musculoskeletal:         General: No swelling.      Right lower leg: " No edema.      Left lower leg: No edema.   Neurological:      General: No focal deficit present.      Mental Status: She is alert.   Psychiatric:         Mood and Affect: Mood normal.            Last Labs:  CBC - 4/13/2024:  6:10 AM  4.8 12.5 210    39.7      CMP - 4/13/2024:  7:53 AM  7.8 5.6 17 --- 0.7   3.3 3.5 13 46      Troponin I, High Sensitivity   Date/Time Value Ref Range Status   04/12/2024 08:27 PM 7 0 - 13 ng/L Final   04/12/2024 07:00 PM 7 0 - 13 ng/L Final     BNP   Date/Time Value Ref Range Status   04/12/2024 07:00 PM 48 0 - 99 pg/mL Final     Hemoglobin A1C   Date/Time Value Ref Range Status   11/20/2019 04:47 AM 5.5 % Final     Comment:          Diagnosis of Diabetes-Adults   Non-Diabetic: < or = 5.6%   Increased risk for developing diabetes: 5.7-6.4%   Diagnostic of diabetes: > or = 6.5%  .       Monitoring of Diabetes                Age (y)     Therapeutic Goal (%)   Adults:          >18           <7.0   Pediatrics:    13-18           <7.5                   7-12           <8.0                   0- 6            7.5-8.5   American Diabetes Association. Diabetes Care 33(S1), Jan 2010.     11/19/2019 03:15 PM 5.4 % Final     Comment:          Diagnosis of Diabetes-Adults   Non-Diabetic: < or = 5.6%   Increased risk for developing diabetes: 5.7-6.4%   Diagnostic of diabetes: > or = 6.5%  .       Monitoring of Diabetes                Age (y)     Therapeutic Goal (%)   Adults:          >18           <7.0   Pediatrics:    13-18           <7.5                   7-12           <8.0                   0- 6            7.5-8.5   American Diabetes Association. Diabetes Care 33(S1), Jan 2010.       VLDL   Date/Time Value Ref Range Status   08/30/2023 09:54 AM 12 0 - 40 mg/dL Final   10/28/2021 10:09 AM 11 0 - 40 mg/dL Final   11/02/2020 12:25 PM 14 0 - 40 mg/dL Final       EKG:   Encounter Date: 04/12/24   ECG 12 lead   Result Value    Ventricular Rate 90    Atrial Rate 90    TN Interval 160    QRS Duration 88     QT Interval 382    QTC Calculation(Bazett) 467    P Axis 102    R Axis 33    T Axis 101    QRS Count 15    Q Onset 205    P Onset 125    P Offset 203    T Offset 396    QTC Fredericia 437    Narrative    Normal sinus rhythm  ST elevation, consider inferior injury or acute infarct  ** ** ACUTE MI / STEMI ** **  Consider right ventricular involvement in acute inferior infarct  Abnormal ECG  When compared with ECG of 12-APR-2024 18:48, (unconfirmed)  No significant change was found         David Meléndez MD

## 2024-04-13 NOTE — PROGRESS NOTES
Physical Therapy                 Therapy Communication Note    Patient Name: Danielle Deshpande  MRN: [id number]  Today's Date: 4/13/2024     Discipline: Physical Therapy    Missed Visit Reason: Missed Visit Reason:  (screen and DC)    PT ordered; chart review complete. Pt demonstrates ability to complete mobility tasks in room independently. Pt is at her functional baseline. No skilled PT needs. Will sign off.

## 2024-04-13 NOTE — DISCHARGE INSTRUCTIONS
It was a pleasure to meet you in the hospital  You presented with having chest discomfort, although the symptoms were not concerning for a heart attack  Testing done in the blood and with EKGs as well as with imaging like chest Xray and ultrasound of the heart confirmed no active heart attack was going on  You had a heart catheterization done by Dr. Barnhart prior to him retiring; this showed no heart artery blockages at that time  You are safe to discharge to home, although you will need a 14 day event monitor; you may come back to the hospital on 4/15/24 and ask to go to [facility] to have this monitor put in place; this information will be useful for when you follow-up with Dr. Christofer Dalton in May 2024, as has been previously planned  You will also follow-up with Dr. David Meléndez, the cardiology physician you met in the hospital for consultation  Dr. Meléndez will explain a specialized CT scan of the chest that can be used instead of a stress test to check for potential blockages in the arteries that the heart uses to supply itself with blood; please call Dr. Meléndez's office and his staff will help you arrange this follow-up, information is listed above.  As you noted today, you have a follow-up appointment with your primacy care physician, Dr. Pizano; please keep this appointment as previously planned  None of your medications were changed or adjusted at this time  We did talk about keeping a Blood Pressure log over the next few weeks and bringing these values in with you to Dr. Pizano  You should check your BP around 12 or 1pm daily, at least an hour after taking your morning pills

## 2024-04-14 LAB
ATRIAL RATE: 72 BPM
ATRIAL RATE: 87 BPM
ATRIAL RATE: 90 BPM
P AXIS: 102 DEGREES
P AXIS: 72 DEGREES
P AXIS: 85 DEGREES
P OFFSET: 203 MS
P OFFSET: 213 MS
P OFFSET: 218 MS
P ONSET: 125 MS
P ONSET: 149 MS
P ONSET: 156 MS
PR INTERVAL: 130 MS
PR INTERVAL: 134 MS
PR INTERVAL: 160 MS
Q ONSET: 205 MS
Q ONSET: 216 MS
Q ONSET: 221 MS
QRS COUNT: 11 BEATS
QRS COUNT: 14 BEATS
QRS COUNT: 15 BEATS
QRS DURATION: 80 MS
QRS DURATION: 88 MS
QRS DURATION: 90 MS
QT INTERVAL: 378 MS
QT INTERVAL: 382 MS
QT INTERVAL: 416 MS
QTC CALCULATION(BAZETT): 454 MS
QTC CALCULATION(BAZETT): 455 MS
QTC CALCULATION(BAZETT): 467 MS
QTC FREDERICIA: 427 MS
QTC FREDERICIA: 437 MS
QTC FREDERICIA: 442 MS
R AXIS: 22 DEGREES
R AXIS: 24 DEGREES
R AXIS: 33 DEGREES
T AXIS: 101 DEGREES
T AXIS: 91 DEGREES
T AXIS: 93 DEGREES
T OFFSET: 396 MS
T OFFSET: 410 MS
T OFFSET: 424 MS
VENTRICULAR RATE: 72 BPM
VENTRICULAR RATE: 87 BPM
VENTRICULAR RATE: 90 BPM

## 2024-04-15 ENCOUNTER — TELEPHONE (OUTPATIENT)
Dept: CARDIOLOGY | Facility: CLINIC | Age: 69
End: 2024-04-15
Payer: COMMERCIAL

## 2024-04-15 ENCOUNTER — HOSPITAL ENCOUNTER (OUTPATIENT)
Dept: CARDIOLOGY | Facility: HOSPITAL | Age: 69
Discharge: HOME | End: 2024-04-15
Payer: COMMERCIAL

## 2024-04-15 ENCOUNTER — PATIENT OUTREACH (OUTPATIENT)
Dept: CARE COORDINATION | Facility: CLINIC | Age: 69
End: 2024-04-15
Payer: COMMERCIAL

## 2024-04-15 DIAGNOSIS — I45.6 WOLFF-PARKINSON-WHITE SYNDROME: ICD-10-CM

## 2024-04-15 PROCEDURE — 93246 EXT ECG>7D<15D RECORDING: CPT

## 2024-04-15 PROCEDURE — 93248 EXT ECG>7D<15D REV&INTERPJ: CPT | Performed by: INTERNAL MEDICINE

## 2024-04-15 NOTE — PROGRESS NOTES
Discharge Facility: [facility]  Discharge Diagnosis: Acute coronary syndrome  Admission Date: 4/12/2024  Discharge Date: 4/13/2024    PCP Appointment Date:  -Unable to schedule d/t no contact; task sent to office to assist with follow up  -Next appt 5/28/2024 1300    Hospital Encounter and Summary: Linked     Unable to reach patient x2 attempts, voicemail left with call back number.

## 2024-04-15 NOTE — TELEPHONE ENCOUNTER
Pt was seen in Hospital. She said she needs to make a 2 wk fu. You don't have anything until June. Do you want me to put her in somewhere in 2 wks?

## 2024-04-29 ENCOUNTER — PATIENT OUTREACH (OUTPATIENT)
Dept: CARE COORDINATION | Facility: CLINIC | Age: 69
End: 2024-04-29
Payer: COMMERCIAL

## 2024-04-29 NOTE — PROGRESS NOTES
"Subjective   Patient ID: [id number]   Danielle Deshpande is a 69 y.o. female known to have RA, OA and chondrocalcinosis of the knees, Osteopenia, vitamin D def, kyphoscoliosis, spinal stenosis, kidney stones, hx of hypercalcemia, migraines, SNHL, HTN, WPW, PAF, and BPPV, presents for follow up     Current rheum meds:  -  mg QD  - Leflunomide 10 mg daily  - Fosamax 70 mg weekly started in 1/24     Previous IS:  - None     HPI   Reports she is having issues with WPW   Palpitations, fatigue, admitted. Her BP was elevated. She worse a Holter monitor for 2 weeks and has a follow up with cardio in   The patient is feeling a bit more pain since decreasing the HCQ, particularly knees and ankles, mostly in the morning, improves as the day goes by, then it gets worse at the end of the day after being active   EMG in 4 days ago, numbness in ankles, feet bilaterally  Swelling in her ankles at the end of the day   Morning stiffness of a few hours   Reports a rash over the back of her neck, improved with some steroid cream and removing her necklace   Compliant with meds  No side effects reported  No episodes of eye inflammation  No sicca sx  No chest pain, cough or dyspnea  No infections    ROS:  As per HPI     Rheum hx:  Last eye exam in 9/23  She was seeing Dr. Umana for RA since around 2020, she just recently retired in 12/23  She had pain in her knees, hands (mostly in her MCPs) and feet and diagnosed as RA  No serology available  Patient has a history of kidney stones s/p nephrostomies   Reports currently pains in her knees, osteoarthritis \"bone on bone\", takes osteobiflex and that helps   Right foot pain, mid foot on the dorsal side, has not seen podiatry in a while   Joint pains are mechanical in nature  MS lasts around 1 hour, takes a shower   Swelling sometimes in her MCPs and feet   Feels like her symptoms and swelling improved with the medications     + Numbness in both legs   + occasional dry mouth   + " photosensitivity on her arms   + nasal ulcers  + has problems with her     History of falls / fractures: yes fracture of the tibia and foot, without a cause   Loss of height: yes 3 inches   Tobacco: no  Alcohol: no  Vitamin D supplementation: yes  Calcium supplementation: yes  Weight bearing exercise: no  Previous or planned invasive jaw surgery: no  History of radiation: no  Chronic steroid use: no  History of RA: yes  GERD: yes  Emelyn-en-y: no  CKD / GFR <30: yes  Parental hip fracture: yes, father    PSH: Spinal fusion, C sections, nephrostomies, finger surgery   Allergies: As per list   Habits: No alcohol, no tobacco, no drugs  Social hx: , 5 kids, healthy, works as a    FHx: No family history of autoimmune diseases     Patient Active Problem List   Diagnosis    Atrophy of vagina    Baker cyst, right    Benign essential hypertension    Cystocele with uterine prolapse    Dental caries extending into dentin    Kyphoscoliosis    Migraine    Mitral regurgitation    Mixed incontinence urge and stress    Nephrolithiasis    Osteoporosis, post-menopausal    Rheumatoid arthritis (Multi)    Spinal stenosis    Tricuspid regurgitation    Vitamin D deficiency    Felton-Parkinson-White (WPW) syndrome    Female bladder prolapse    BPPV (benign paroxysmal positional vertigo)    Hypercalciuria    Localized osteoarthrosis    Personal history of colonic polyps    Osteochondropathy    PAF (paroxysmal atrial fibrillation) (Multi)    Sensorineural hearing loss, bilateral    Neck pain    Back pain    ACS (acute coronary syndrome) (Multi)    Acute coronary syndrome (Multi)    Shortness of breath      Past Medical History:   Diagnosis Date    Acute recurrent sinusitis, unspecified 02/04/2022    Acute recurrent sinusitis, unspecified location    Arthritis 2010    Colon polyp 1986    Heart disease     WPW    Hypertension 1993    Other signs and symptoms in breast 06/11/2014    Inversion of nipple    Personal  history of other diseases of the circulatory system 2014    History of hypertension    Personal history of other diseases of the female genital tract 2020    History of postmenopausal bleeding    Personal history of other diseases of the female genital tract 2022    History of vaginitis    Personal history of other diseases of the musculoskeletal system and connective tissue 2014    Personal history of scoliosis    Personal history of other specified conditions 2014    History of lump of right breast    Personal history of other specified conditions 2020    History of fatigue    PONV (postoperative nausea and vomiting) 1980    Unspecified hydronephrosis 2020    Hydronephrosis of right kidney     Past Surgical History:   Procedure Laterality Date    BACK SURGERY  2014    Back Surgery    BREAST BIOPSY  2014    Biopsy Breast Open     SECTION, LOW TRANSVERSE  1991    COLONOSCOPY  2014    Colonoscopy (Fiberoptic)    OTHER SURGICAL HISTORY  2022    Percutaneous nephrolithotomy    OTHER SURGICAL HISTORY  2022    Renal lithotripsy     Social History     Socioeconomic History    Marital status:      Spouse name: Not on file    Number of children: Not on file    Years of education: Not on file    Highest education level: Not on file   Occupational History    Not on file   Tobacco Use    Smoking status: Never    Smokeless tobacco: Never   Vaping Use    Vaping status: Never Used   Substance and Sexual Activity    Alcohol use: Not Currently    Drug use: Never    Sexual activity: Not Currently     Partners: Male     Birth control/protection: Post-menopausal   Other Topics Concern    Not on file   Social History Narrative    Not on file     Social Determinants of Health     Financial Resource Strain: Low Risk  (2024)    Overall Financial Resource Strain (CARDIA)     Difficulty of Paying Living Expenses: Not very hard   Food Insecurity: Not  on file   Transportation Needs: No Transportation Needs (4/13/2024)    PRAPARE - Transportation     Lack of Transportation (Medical): No     Lack of Transportation (Non-Medical): No   Physical Activity: Not on file   Stress: Not on file   Social Connections: Not on file   Intimate Partner Violence: Not on file   Housing Stability: Low Risk  (4/13/2024)    Housing Stability Vital Sign     Unable to Pay for Housing in the Last Year: No     Number of Places Lived in the Last Year: 1     Unstable Housing in the Last Year: No     Allergies   Allergen Reactions    Diphenhydramine Hcl Other     Confusion     Levaquin [Levofloxacin] Other     Confusion    Novocain [Procaine] Other     Palpatations      Current Outpatient Medications:     alendronate (Fosamax) 70 mg tablet, Take 1 tablet (70 mg) by mouth every 7 days. On Sundays. Take on an empty stomach. Do not lie down or eat for 1/2 hour after taking., Disp: 51 tablet, Rfl: 0    amitriptyline (Elavil) 10 mg tablet, Take 1 tablet (10 mg) by mouth once daily at bedtime., Disp: 90 tablet, Rfl: 3    biotin 5,000 mcg tablet,disintegrating, Take 1 tablet daily., Disp: , Rfl:     calcium carbonate (Oscal) 500 mg calcium (1,250 mg) tablet, Take 1 tablet (1,250 mg) by mouth once daily., Disp: , Rfl:     cholecalciferol (Vitamin D-3) 50 mcg (2,000 unit) capsule, Take 1 capsule daily, Disp: , Rfl:     cranberry 500 mg capsule, Take 1 capsule by mouth once daily., Disp: , Rfl:     docusate sodium (Colace) 100 mg capsule, Take 1 capsule (100 mg) by mouth once daily., Disp: , Rfl:     estradiol (Estrace) 0.01 % (0.1 mg/gram) vaginal cream, Insert 1 Applicatorful (4 g) into the vagina 2 times a week., Disp: , Rfl:     glucosamine-chondroitin (Osteo Bi-Flex) 250-200 mg tablet, Take 1 tablet by mouth 2 times a day., Disp: , Rfl:     hydroxychloroquine (Plaquenil) 200 mg tablet, Take 1 tablet (200 mg) by mouth once daily., Disp: 90 tablet, Rfl: 1    leflunomide (Arava) 20 mg tablet, Take 1  tablet (20 mg) by mouth once daily., Disp: 180 tablet, Rfl: 1    lisinopril 10 mg tablet, Take 1 tablet (10 mg) by mouth once daily., Disp: 90 tablet, Rfl: 3    magnesium glycinate 100 mg tablet, Take 1 tablet by mouth once daily., Disp: , Rfl:     metoprolol succinate XL (Toprol-XL) 50 mg 24 hr tablet, Take 1 tablet (50 mg) by mouth once daily. Do not crush or chew., Disp: 90 tablet, Rfl: 3    multivitamin with minerals tablet, Take 0.5 tablets by mouth once daily., Disp: , Rfl:     turmeric root extract 500 mg capsule, Take 1 capsule by mouth 2 times a day., Disp: , Rfl:      Objective   Visit Vitals  /79   Pulse 78   Temp 37.1 °C (98.8 °F)   Resp 20   Wt 53.1 kg (117 lb)   BMI 23.24 kg/m²   OB Status Postmenopausal   Smoking Status Never   BSA 1.49 m²     Physical Exam:  General: AAOx3, Cooperative  Head: normocephalic, atraumatic, no hair loss   Eyes: EOMI, conjunctiva clear, sclera white, anicteric  Ears: hearing intact  Nose: no deformity, no crusting   Throat/Mouth: No oral deformities, no cheek swelling, mucosa appear moist, no oral ulcers noted. Has some teeth missing (recently extracted) denture on the bottom   Neck/Lymph: FROM, trachea midline  Lungs: chest expansion symmetric, clear to auscultation bilaterally. No wheezing, rhonchi, or stridor  Heart: S1, S2, RRR. No murmur or rub  Abdomen: Soft, non-tender without masses  Skin: Slight erythema of her posterior neck   MSK: Upper Extremities:  Hand/Fingers: Hypertrophy of bilateral 2nd-4th MCP. TTP of DIPs. No erythema, edema, tenderness or warmth at DIP, PIP, or MCP joints, FROM grossly. Good hand . Heberden's nodes bilaterally   Wrists: No erythema, edema, warmth or tenderness at wrist, FROM grossly  Elbows: No tenderness, edema, erythema or warmth at elbows, FROM grossly. No nodules   Shoulders: No edema, erythema, tenderness or warmth at shoulders. FROM  Lower Extremities:   Hips: No obvious deformities. No joint tenderness, normal ROM  grossly. No trochanteric bursae TTP  Knees: No tenderness, deformities, edema, rashes, or warmth, normal ROM grossly. No crepitus, no pes anserine bursa TTP   Ankles, feet: Flexed toes. No tenderness, edema, erythema, ulceration, or warmth at the ankle or MTP/IP joints, normal ROM grossly  Spine: Significant scoliosis from the thoracic area down, to the right. No spinal tenderness to palpation. No SI joint tenderness     Lab Results   Component Value Date    WBC 4.4 04/30/2024    HGB 12.9 04/30/2024    HCT 41.2 04/30/2024    MCV 94 04/30/2024     04/30/2024        Chemistry    Lab Results   Component Value Date/Time     04/30/2024 1041    K 4.0 04/30/2024 1041     (H) 04/30/2024 1041    CO2 28 04/30/2024 1041    BUN 29 (H) 04/30/2024 1041    CREATININE 0.76 04/30/2024 1041    Lab Results   Component Value Date/Time    CALCIUM 8.8 04/30/2024 1041    ALKPHOS 72 04/30/2024 1041    AST 23 04/30/2024 1041    ALT 15 04/30/2024 1041    BILITOT 0.4 04/30/2024 1041        Lab Results   Component Value Date    NEUTROABS 2.45 04/30/2024     Lab Results   Component Value Date    HEPCAB Nonreactive 10/13/2023      Lab Results   Component Value Date    ALT 15 04/30/2024    AST 23 04/30/2024    ALKPHOS 72 04/30/2024    BILITOT 0.4 04/30/2024     Lab Results   Component Value Date    URICACID 4.3 05/14/2021      Lab Results   Component Value Date    PTH 45.5 02/29/2024    CALCIUM 8.8 04/30/2024    PHOS 3.3 02/29/2024     ECG 12 lead  Normal sinus rhythm  Possible Left atrial enlargement  Possible Inferior infarct , age undetermined  Nonspecific ST and T wave abnormality  Abnormal ECG  When compared with ECG of 12-APR-2024 18:49, (unconfirmed)  No significant change was found  Confirmed by Tate Nevarez ([id number]) on 4/14/2024 2:41:15 PM  ECG 12 lead  Normal sinus rhythm  ST elevation, consider inferior injury or acute infarct possible posterior  ** ** ACUTE MI / STEMI ** **  Consider right ventricular involvement in  acute inferior infarct  Abnormal ECG  When compared with ECG of 12-APR-2024 18:48, (unconfirmed)  No significant change was found  Confirmed by Tate Nevarez ([id number]) on 4/14/2024 2:37:39 PM  ECG 12 lead  Normal sinus rhythm  Possible Left atrial enlargement  ST elevation, consider inferior injury or acute infarct possible posterior infarct  ** ** ACUTE MI / STEMI ** **  Consider right ventricular involvement in acute inferior infarct  Abnormal ECG  When compared with ECG of 31-DEC-2019 05:19,  Significant changes have occurred  Confirmed by Tate Nevarez [id number]) on 4/14/2024 2:36:56 PM     === 09/12/23 ===  XR ABDOMEN 1 VIEW  No definite change from CT 24 August 2022     === 10/24/18 ===  MRI CERVICAL SPINE WO CONTRAST  Mild multifocal degenerative changes as described above     === 10/31/23 ===  DEXA BONE DENSITY  LEFT FEMUR -TOTAL BMD: 0.754 T-Score -2.0   Bone Mineral Density change vs baseline: -11.3%  Bone Mineral Density change vs previous: -2.3%      LEFT FEMUR -NECK BMD: 0.798 T-Score -1.7     LEFT FOREARM Total BMD: 0.616 T-Score -1.0   UD Bone Mineral Density: 0.394 T-Score -1.5  1/3 BMD: 0.829 T-Score -0.5   Bone Mineral Density change vs baseline:  -1%  Bone Mineral Density change vs previous:  Not reported     10-year Fracture Risk:  Major Osteoporotic Fracture: 13.2%  Hip Fracture: 2.3%      Assessment/Plan    This is a This is a 69 y.o. female, known to have seronegative RA, chondrocalcinosis of the hands, Osteopenia, vitamin D def, OA, kyphoscoliosis, spinal stenosis, presents for follow up  Last seen in 1/24     Patient with a prior diagnosis of seronegative RA. She also has some symptoms suggestive of CTD. RF, CCP, SANJIV neg. She has osteopenia with low FRAX but 2 fragility fractures without injury. Discussion with the patient about BP and explained risks and benefits. Started on Fosamax     Labs:  4/24: Prt 6.2, rest of CMP, CBC, ESR, CRP wnl  4/24: CBC, CMP, Mg, ProBNP, trop wnl  2/24: CRP, SPEP, Ph, PTH  wnl  1/24: ALC 0.78, Prt 6.2, rest of CMP, CBC, ESR, C3, C4, Uprt, vitamin D wnl  RF, CCP, SANJIV wnl  10/23: CMP wnl. Hep C neg  8/23: CBC, CMP, TSH wnl     Imaging:  Xray hands: Arthritic changes are most severe involving the 3rd metacarpal  phalangeal joints, with left hand arthritis more severe than right. Triangular fibrocartilage calcifications most likely reflect calcium pyrophosphate deposition.    Xray feet: Periarticular erosions involving left 2nd through 4th metatarsal  heads and right 2nd and 3rd metatarsal heads. Calcaneal spurs and plantar fascial calcification    Xray knees: Hypertrophic degenerative arthritic changes are noted. Small bilateral effusions. Menisci are calcified bilaterally    DEXA 10/23:  LEFT FEMUR -TOTAL BMD: 0.754 T-Score -2.0     LEFT FEMUR -NECK BMD: 0.798 T-Score -1.7  1/3 forearm BMD: 0.829 T-Score -0.5   FRAX Major Osteoporotic Fracture: 13.2%. Hip Fracture: 2.3%    # RA, reports worsening pains and MS, no active synovitis on exam today  # OA   # Osteopenia with low FRAX but reports hx of 2 fragility fractures. Started on Fosamax, secondary work up is negative    # Long term use of IS  # Vitamin D def, replenished   # LL neuropathy, EMG scheduled for this week    - Keep  mg every day (weight 53.5 kg), refilled  - If cardiology recommends stopping HCQ, then we can stop it  - Make Leflunomide 20 mg daily, refilled  - Continue Fosomax 70 mg once weekly   - EMG for LL numbness this Friday  - Refer to PT for knee OA  - Refer to ortho for knee OA  - Labs before next dov  - Continue vitamin D  - Disability Placarad     RTC in 4 months    Plan, including risks and benefits, was discussed with the patient, informed on how to reach us.     To schedule an appointment, call [phone]  To reach the rheumatology office, call [phone]    Laila Alonso MD   Division of Rheumatology  [facility]

## 2024-04-30 ENCOUNTER — LAB (OUTPATIENT)
Dept: LAB | Facility: LAB | Age: 69
End: 2024-04-30
Payer: COMMERCIAL

## 2024-04-30 DIAGNOSIS — M41.9 KYPHOSCOLIOSIS: ICD-10-CM

## 2024-04-30 DIAGNOSIS — M06.9 RHEUMATOID ARTHRITIS INVOLVING MULTIPLE SITES, UNSPECIFIED WHETHER RHEUMATOID FACTOR PRESENT (MULTI): ICD-10-CM

## 2024-04-30 DIAGNOSIS — M05.79 RHEUMATOID ARTHRITIS INVOLVING MULTIPLE SITES WITH POSITIVE RHEUMATOID FACTOR (MULTI): ICD-10-CM

## 2024-04-30 DIAGNOSIS — E55.9 VITAMIN D DEFICIENCY: ICD-10-CM

## 2024-04-30 DIAGNOSIS — Z79.60 LONG-TERM USE OF IMMUNOSUPPRESSANT MEDICATION: ICD-10-CM

## 2024-04-30 DIAGNOSIS — M85.852 OSTEOPENIA OF NECK OF LEFT FEMUR: ICD-10-CM

## 2024-04-30 LAB
ALBUMIN SERPL BCP-MCNC: 3.9 G/DL (ref 3.4–5)
ALP SERPL-CCNC: 72 U/L (ref 33–136)
ALT SERPL W P-5'-P-CCNC: 15 U/L (ref 7–45)
ANION GAP SERPL CALC-SCNC: 10 MMOL/L (ref 10–20)
AST SERPL W P-5'-P-CCNC: 23 U/L (ref 9–39)
BASOPHILS # BLD AUTO: 0.07 X10*3/UL (ref 0–0.1)
BASOPHILS NFR BLD AUTO: 1.6 %
BILIRUB SERPL-MCNC: 0.4 MG/DL (ref 0–1.2)
BUN SERPL-MCNC: 29 MG/DL (ref 6–23)
CALCIUM SERPL-MCNC: 8.8 MG/DL (ref 8.6–10.3)
CHLORIDE SERPL-SCNC: 109 MMOL/L (ref 98–107)
CO2 SERPL-SCNC: 28 MMOL/L (ref 21–32)
CREAT SERPL-MCNC: 0.76 MG/DL (ref 0.5–1.05)
CRP SERPL-MCNC: 0.12 MG/DL
EGFRCR SERPLBLD CKD-EPI 2021: 85 ML/MIN/1.73M*2
EOSINOPHIL # BLD AUTO: 0.17 X10*3/UL (ref 0–0.7)
EOSINOPHIL NFR BLD AUTO: 3.8 %
ERYTHROCYTE [DISTWIDTH] IN BLOOD BY AUTOMATED COUNT: 12.3 % (ref 11.5–14.5)
ERYTHROCYTE [SEDIMENTATION RATE] IN BLOOD BY WESTERGREN METHOD: 3 MM/H (ref 0–30)
GLUCOSE SERPL-MCNC: 88 MG/DL (ref 74–99)
HCT VFR BLD AUTO: 41.2 % (ref 36–46)
HGB BLD-MCNC: 12.9 G/DL (ref 12–16)
IMM GRANULOCYTES # BLD AUTO: 0.01 X10*3/UL (ref 0–0.7)
IMM GRANULOCYTES NFR BLD AUTO: 0.2 % (ref 0–0.9)
LYMPHOCYTES # BLD AUTO: 1.23 X10*3/UL (ref 1.2–4.8)
LYMPHOCYTES NFR BLD AUTO: 27.7 %
MCH RBC QN AUTO: 29.3 PG (ref 26–34)
MCHC RBC AUTO-ENTMCNC: 31.3 G/DL (ref 32–36)
MCV RBC AUTO: 94 FL (ref 80–100)
MONOCYTES # BLD AUTO: 0.51 X10*3/UL (ref 0.1–1)
MONOCYTES NFR BLD AUTO: 11.5 %
NEUTROPHILS # BLD AUTO: 2.45 X10*3/UL (ref 1.2–7.7)
NEUTROPHILS NFR BLD AUTO: 55.2 %
NRBC BLD-RTO: 0 /100 WBCS (ref 0–0)
PLATELET # BLD AUTO: 219 X10*3/UL (ref 150–450)
POTASSIUM SERPL-SCNC: 4 MMOL/L (ref 3.5–5.3)
PROT SERPL-MCNC: 6.2 G/DL (ref 6.4–8.2)
RBC # BLD AUTO: 4.4 X10*6/UL (ref 4–5.2)
SODIUM SERPL-SCNC: 143 MMOL/L (ref 136–145)
WBC # BLD AUTO: 4.4 X10*3/UL (ref 4.4–11.3)

## 2024-04-30 PROCEDURE — 85025 COMPLETE CBC W/AUTO DIFF WBC: CPT

## 2024-04-30 PROCEDURE — 36415 COLL VENOUS BLD VENIPUNCTURE: CPT

## 2024-04-30 PROCEDURE — 86140 C-REACTIVE PROTEIN: CPT

## 2024-04-30 PROCEDURE — 85652 RBC SED RATE AUTOMATED: CPT

## 2024-04-30 PROCEDURE — 80053 COMPREHEN METABOLIC PANEL: CPT

## 2024-05-06 ENCOUNTER — OFFICE VISIT (OUTPATIENT)
Dept: RHEUMATOLOGY | Facility: CLINIC | Age: 69
End: 2024-05-06
Payer: COMMERCIAL

## 2024-05-06 ENCOUNTER — TELEPHONE (OUTPATIENT)
Dept: CARDIOLOGY | Facility: HOSPITAL | Age: 69
End: 2024-05-06

## 2024-05-06 VITALS
SYSTOLIC BLOOD PRESSURE: 157 MMHG | WEIGHT: 117 LBS | HEART RATE: 78 BPM | RESPIRATION RATE: 20 BRPM | BODY MASS INDEX: 23.24 KG/M2 | TEMPERATURE: 98.8 F | DIASTOLIC BLOOD PRESSURE: 79 MMHG

## 2024-05-06 DIAGNOSIS — M05.79 RHEUMATOID ARTHRITIS INVOLVING MULTIPLE SITES WITH POSITIVE RHEUMATOID FACTOR (MULTI): ICD-10-CM

## 2024-05-06 DIAGNOSIS — M41.9 KYPHOSCOLIOSIS: ICD-10-CM

## 2024-05-06 DIAGNOSIS — E55.9 VITAMIN D DEFICIENCY: ICD-10-CM

## 2024-05-06 DIAGNOSIS — M11.20 CHONDROCALCINOSIS: ICD-10-CM

## 2024-05-06 DIAGNOSIS — M06.09 RHEUMATOID ARTHRITIS OF MULTIPLE SITES WITH NEGATIVE RHEUMATOID FACTOR (MULTI): Primary | ICD-10-CM

## 2024-05-06 DIAGNOSIS — M15.9 PRIMARY OSTEOARTHRITIS INVOLVING MULTIPLE JOINTS: ICD-10-CM

## 2024-05-06 DIAGNOSIS — G57.93 NEUROPATHY INVOLVING BOTH LOWER EXTREMITIES: ICD-10-CM

## 2024-05-06 DIAGNOSIS — M85.852 OSTEOPENIA OF NECK OF LEFT FEMUR: ICD-10-CM

## 2024-05-06 DIAGNOSIS — M06.9 RHEUMATOID ARTHRITIS INVOLVING MULTIPLE SITES, UNSPECIFIED WHETHER RHEUMATOID FACTOR PRESENT (MULTI): ICD-10-CM

## 2024-05-06 DIAGNOSIS — Z79.60 LONG-TERM USE OF IMMUNOSUPPRESSANT MEDICATION: ICD-10-CM

## 2024-05-06 PROCEDURE — 1159F MED LIST DOCD IN RCRD: CPT | Performed by: STUDENT IN AN ORGANIZED HEALTH CARE EDUCATION/TRAINING PROGRAM

## 2024-05-06 PROCEDURE — 3078F DIAST BP <80 MM HG: CPT | Performed by: STUDENT IN AN ORGANIZED HEALTH CARE EDUCATION/TRAINING PROGRAM

## 2024-05-06 PROCEDURE — 99215 OFFICE O/P EST HI 40 MIN: CPT | Performed by: STUDENT IN AN ORGANIZED HEALTH CARE EDUCATION/TRAINING PROGRAM

## 2024-05-06 PROCEDURE — 1160F RVW MEDS BY RX/DR IN RCRD: CPT | Performed by: STUDENT IN AN ORGANIZED HEALTH CARE EDUCATION/TRAINING PROGRAM

## 2024-05-06 PROCEDURE — 3077F SYST BP >= 140 MM HG: CPT | Performed by: STUDENT IN AN ORGANIZED HEALTH CARE EDUCATION/TRAINING PROGRAM

## 2024-05-06 RX ORDER — ALENDRONATE SODIUM 70 MG/1
70 TABLET ORAL
Qty: 51 TABLET | Refills: 0 | Status: SHIPPED | OUTPATIENT
Start: 2024-05-06 | End: 2025-05-06

## 2024-05-06 RX ORDER — LEFLUNOMIDE 20 MG/1
20 TABLET ORAL DAILY
Qty: 180 TABLET | Refills: 1 | Status: SHIPPED | OUTPATIENT
Start: 2024-05-06 | End: 2025-05-01

## 2024-05-06 NOTE — TELEPHONE ENCOUNTER
Please let patient know good news.  Her Holter monitor only showed some very rare episodes of tachycardia from the top chamber of the heart.  The longest episode is described as 17 seconds but when I looked at the actual tracings it is probably shorter than that with a mix of SVT and sinus tachycardia.  The good news is she had nothing that lasted for more than 17 seconds.    Recommend no changes for now.    Keep appointment with Dr. Dalton for later this month.    SDH

## 2024-05-08 ENCOUNTER — OFFICE VISIT (OUTPATIENT)
Dept: PRIMARY CARE | Facility: CLINIC | Age: 69
End: 2024-05-08
Payer: COMMERCIAL

## 2024-05-08 VITALS
DIASTOLIC BLOOD PRESSURE: 86 MMHG | OXYGEN SATURATION: 99 % | RESPIRATION RATE: 16 BRPM | TEMPERATURE: 98 F | SYSTOLIC BLOOD PRESSURE: 144 MMHG | BODY MASS INDEX: 23.29 KG/M2 | HEART RATE: 90 BPM | HEIGHT: 60 IN | WEIGHT: 118.6 LBS

## 2024-05-08 DIAGNOSIS — I10 BENIGN ESSENTIAL HYPERTENSION: ICD-10-CM

## 2024-05-08 DIAGNOSIS — I24.9 ACS (ACUTE CORONARY SYNDROME) (MULTI): ICD-10-CM

## 2024-05-08 DIAGNOSIS — I47.10 PAROXYSMAL SUPRAVENTRICULAR TACHYCARDIA (CMS-HCC): Primary | ICD-10-CM

## 2024-05-08 DIAGNOSIS — M06.9 RHEUMATOID ARTHRITIS INVOLVING MULTIPLE SITES, UNSPECIFIED WHETHER RHEUMATOID FACTOR PRESENT (MULTI): ICD-10-CM

## 2024-05-08 DIAGNOSIS — I45.6 WOLFF-PARKINSON-WHITE (WPW) SYNDROME: ICD-10-CM

## 2024-05-08 DIAGNOSIS — G43.909 MIGRAINE WITHOUT STATUS MIGRAINOSUS, NOT INTRACTABLE, UNSPECIFIED MIGRAINE TYPE: ICD-10-CM

## 2024-05-08 PROCEDURE — 1036F TOBACCO NON-USER: CPT | Performed by: INTERNAL MEDICINE

## 2024-05-08 PROCEDURE — 1159F MED LIST DOCD IN RCRD: CPT | Performed by: INTERNAL MEDICINE

## 2024-05-08 PROCEDURE — 1160F RVW MEDS BY RX/DR IN RCRD: CPT | Performed by: INTERNAL MEDICINE

## 2024-05-08 PROCEDURE — 3079F DIAST BP 80-89 MM HG: CPT | Performed by: INTERNAL MEDICINE

## 2024-05-08 PROCEDURE — 1126F AMNT PAIN NOTED NONE PRSNT: CPT | Performed by: INTERNAL MEDICINE

## 2024-05-08 PROCEDURE — 3077F SYST BP >= 140 MM HG: CPT | Performed by: INTERNAL MEDICINE

## 2024-05-08 PROCEDURE — 99214 OFFICE O/P EST MOD 30 MIN: CPT | Performed by: INTERNAL MEDICINE

## 2024-05-08 RX ORDER — METOPROLOL SUCCINATE 50 MG/1
75 TABLET, EXTENDED RELEASE ORAL DAILY
Qty: 135 TABLET | Refills: 3 | Status: SHIPPED | OUTPATIENT
Start: 2024-05-08 | End: 2025-05-08

## 2024-05-08 ASSESSMENT — PATIENT HEALTH QUESTIONNAIRE - PHQ9
2. FEELING DOWN, DEPRESSED OR HOPELESS: NOT AT ALL
SUM OF ALL RESPONSES TO PHQ9 QUESTIONS 1 AND 2: 0
1. LITTLE INTEREST OR PLEASURE IN DOING THINGS: NOT AT ALL

## 2024-05-08 ASSESSMENT — PAIN SCALES - GENERAL: PAINLEVEL: 0-NO PAIN

## 2024-05-08 NOTE — PROGRESS NOTES
"Subjective   Danielle Deshpande is a 69 y.o. female who presents for Follow-up (Hospital visit ).      Patient was in the hospital 4.12.2024-4.13.2024 for Acute coronary syndrome  Patient had labs, echo, EKG and xray done while in the hospital, then she wore a holter monitor   Patient states that she is feeling much better then she was previously  Patient was told by Dr. Meléndez to keep track of her blood pressures which she has, and reading have been around 130-140 with an occasionally 150 with the last readings being in the 120s     Getting a EMG done on Friday 5.10.2024    Presented with irregular heart beat and not feeling well.  She was seen by cardiology and is seeing EP Krystle in 2 weeks.    Has been feeling better but still has fluttering in the heart.  History of Atrial Fibrillation.  Seen by rheum and decreased plaquenil.            Review of Systems   All other systems reviewed and are negative.      Objective   /86   Pulse 90   Temp 36.7 °C (98 °F)   Resp 16   Ht 1.511 m (4' 11.5\")   Wt 53.8 kg (118 lb 9.6 oz)   SpO2 99%   BMI 23.55 kg/m²       Physical Exam  Constitutional:       Appearance: Normal appearance.   HENT:      Head: Normocephalic.   Eyes:      Conjunctiva/sclera: Conjunctivae normal.   Cardiovascular:      Rate and Rhythm: Normal rate and regular rhythm.      Heart sounds: Murmur heard.   Pulmonary:      Effort: Pulmonary effort is normal.      Breath sounds: Normal breath sounds.   Musculoskeletal:      Cervical back: Neck supple.   Skin:     General: Skin is warm and dry.   Neurological:      Mental Status: She is alert.         Assessment/Plan   Problem List Items Addressed This Visit       Benign essential hypertension    Relevant Medications    metoprolol succinate XL (Toprol-XL) 50 mg 24 hr tablet    Migraine    Relevant Medications    metoprolol succinate XL (Toprol-XL) 50 mg 24 hr tablet    Rheumatoid arthritis (Multi)    Felton-Parkinson-White (WPW) syndrome    Relevant " Medications    metoprolol succinate XL (Toprol-XL) 50 mg 24 hr tablet    RESOLVED: ACS (acute coronary syndrome) (Multi)    Relevant Medications    metoprolol succinate XL (Toprol-XL) 50 mg 24 hr tablet    Paroxysmal supraventricular tachycardia (CMS-HCC) - Primary    Relevant Medications    metoprolol succinate XL (Toprol-XL) 50 mg 24 hr tablet     Encounter Diagnoses   Name Primary?    Paroxysmal supraventricular tachycardia (CMS-HCC) Yes    Migraine without status migrainosus, not intractable, unspecified migraine type     Benign essential hypertension     Rheumatoid arthritis involving multiple sites, unspecified whether rheumatoid factor present (Multi)     Felton-Parkinson-White (WPW) syndrome     ACS (acute coronary syndrome) (Multi)      Reviewed hospital records and cardiology evaluation.  EKG, Echocardiogram reviewed as well as all labs.  Patient with Hypertension, WPW and has had increased palpitations and feeling poorly.  Improving since discharge and rheumatology decreased plaquenil.  Will increase metoprolol to 75 mg daily and recheck BP ib one week.  [place], DO

## 2024-05-10 ENCOUNTER — HOSPITAL ENCOUNTER (OUTPATIENT)
Dept: NEUROLOGY | Facility: CLINIC | Age: 69
Discharge: HOME | End: 2024-05-10
Payer: COMMERCIAL

## 2024-05-10 DIAGNOSIS — M06.9 RHEUMATOID ARTHRITIS INVOLVING MULTIPLE SITES, UNSPECIFIED WHETHER RHEUMATOID FACTOR PRESENT (MULTI): ICD-10-CM

## 2024-05-10 DIAGNOSIS — E55.9 VITAMIN D DEFICIENCY: ICD-10-CM

## 2024-05-10 DIAGNOSIS — M85.852 OSTEOPENIA OF NECK OF LEFT FEMUR: ICD-10-CM

## 2024-05-10 DIAGNOSIS — M41.9 KYPHOSCOLIOSIS: ICD-10-CM

## 2024-05-10 DIAGNOSIS — Z79.60 LONG-TERM USE OF IMMUNOSUPPRESSANT MEDICATION: ICD-10-CM

## 2024-05-10 PROCEDURE — 95860 NEEDLE EMG 1 EXTREMITY: CPT | Performed by: SPECIALIST

## 2024-05-10 PROCEDURE — 95886 MUSC TEST DONE W/N TEST COMP: CPT | Performed by: SPECIALIST

## 2024-05-10 PROCEDURE — 95909 NRV CNDJ TST 5-6 STUDIES: CPT | Performed by: SPECIALIST

## 2024-05-13 ENCOUNTER — TELEPHONE (OUTPATIENT)
Dept: PRIMARY CARE | Facility: CLINIC | Age: 69
End: 2024-05-13
Payer: COMMERCIAL

## 2024-05-13 DIAGNOSIS — M54.17 LUMBOSACRAL RADICULOPATHY AT L5: Primary | ICD-10-CM

## 2024-05-13 NOTE — PROGRESS NOTES
EMG: This is an abnormal EMG/NCS of the right lower extremity. There is electrophysiological evidence consistent with chronic mild right L5 radiculopathy with no active denervation.     -> Refer to spine surgery

## 2024-05-14 NOTE — TELEPHONE ENCOUNTER
Left patient a detailed message  
Patient states she's had a few dizzy spells since metoprolol succinate XL (Toprol-XL) 50 mg 24 hr tablet was increased to 50. She wast old to call and let the office know. She is seeing Cardiologist on Wed May 15 ,24 should she go back to original dose before increase.    Please advise    Thank you !  
no

## 2024-05-15 ENCOUNTER — OFFICE VISIT (OUTPATIENT)
Dept: CARDIOLOGY | Facility: CLINIC | Age: 69
End: 2024-05-15
Payer: COMMERCIAL

## 2024-05-15 VITALS
BODY MASS INDEX: 23.16 KG/M2 | HEART RATE: 78 BPM | TEMPERATURE: 97.6 F | SYSTOLIC BLOOD PRESSURE: 140 MMHG | DIASTOLIC BLOOD PRESSURE: 86 MMHG | HEIGHT: 60 IN | WEIGHT: 118 LBS

## 2024-05-15 DIAGNOSIS — I48.0 PAROXYSMAL ATRIAL FIBRILLATION (MULTI): Primary | ICD-10-CM

## 2024-05-15 DIAGNOSIS — I45.6 WOLFF-PARKINSON-WHITE (WPW) SYNDROME: ICD-10-CM

## 2024-05-15 PROCEDURE — 3077F SYST BP >= 140 MM HG: CPT | Performed by: INTERNAL MEDICINE

## 2024-05-15 PROCEDURE — 99213 OFFICE O/P EST LOW 20 MIN: CPT | Performed by: INTERNAL MEDICINE

## 2024-05-15 PROCEDURE — 1159F MED LIST DOCD IN RCRD: CPT | Performed by: INTERNAL MEDICINE

## 2024-05-15 PROCEDURE — 3079F DIAST BP 80-89 MM HG: CPT | Performed by: INTERNAL MEDICINE

## 2024-05-15 PROCEDURE — 1160F RVW MEDS BY RX/DR IN RCRD: CPT | Performed by: INTERNAL MEDICINE

## 2024-05-15 PROCEDURE — 1036F TOBACCO NON-USER: CPT | Performed by: INTERNAL MEDICINE

## 2024-05-15 NOTE — PROGRESS NOTES
Subjective:  The patient is a 69-year-old white female, followed primarily by Dr. West Pizano, who presents for follow-up because of Felton-Parkinson-White syndrome.  She has a history of chronic hypertension, severe scoliosis, nephrolithiasis, and degenerative joint disease.  She went into atrial fibrillation in December 2019 after one of her urologic procedures, though the QRS complexes were not preexcited.  She is felt to have a weak anterograde posteroseptal pathway causing a pseudo-inferoposterior infarct pattern on her EKGs, though the preexcitation has been intermittent.  The patient had minimal coronary disease by catheterization in 2019 with an LVEF of 50 to 55%.    The patient was hospitalized at [facility] in April 2024 because of some palpitations and atypical chest pain.  She was seen by Dr. David Meléndez, and a cardiac event monitor ordered.  She wore a monitor for 2 weeks, and had some spells of supraventricular tachycardia up to 17 beats in duration, perhaps representing orthodromic AVRT.  Some of the other strips, however, suggested intra-atrial tachycardia.  The patient's primary care physician increased her Toprol-XL from 50 mg daily up to 75 mg daily, but she felt fatigued and lightheaded on this and went back to the 50 mg daily.  She has not been on specific antiarrhythmic therapy nor has she undergone ablation therapy.    The patient is followed by rheumatology for her scoliosis and other rheumatologic problems.  She may need knee replacements in the future as well.    Current Outpatient Medications   Medication Sig    alendronate (Fosamax) 70 mg tablet Take 1 tablet (70 mg) by mouth every 7 days. On Sundays. Take on an empty stomach. Do not lie down or eat for 1/2 hour after taking.    amitriptyline (Elavil) 10 mg tablet Take 1 tablet (10 mg) by mouth once daily at bedtime.    biotin 5,000 mcg tablet,disintegrating Take 1 tablet daily.    calcium carbonate (Oscal) 500 mg  calcium (1,250 mg) tablet Take 1 tablet (1,250 mg) by mouth once daily.    cholecalciferol (Vitamin D-3) 50 mcg (2,000 unit) capsule Take 1 capsule daily    cranberry 500 mg capsule Take 1 capsule by mouth once daily.    docusate sodium (Colace) 100 mg capsule Take 1 capsule (100 mg) by mouth once daily.    estradiol (Estrace) 0.01 % (0.1 mg/gram) vaginal cream Insert 1 Applicatorful (4 g) into the vagina 2 times a week.    glucosamine-chondroitin (Osteo Bi-Flex) 250-200 mg tablet Take 1 tablet by mouth 2 times a day.    hydroxychloroquine (Plaquenil) 200 mg tablet Take 1 tablet (200 mg) by mouth once daily.    leflunomide (Arava) 20 mg tablet Take 1 tablet (20 mg) by mouth once daily.    lisinopril 10 mg tablet Take 1 tablet (10 mg) by mouth once daily.    magnesium glycinate 100 mg tablet Take 1 tablet by mouth once daily.    metoprolol succinate XL (Toprol-XL) 50 mg 24 hr tablet Take 1.5 tablets (75 mg) by mouth once daily. Do not crush or chew. (Patient taking differently: Take 1.5 tablets (75 mg) by mouth once daily. Do not crush or chew.- has been taking 50 mg daily)    multivitamin with minerals tablet Take 0.5 tablets by mouth once daily.    turmeric root extract 500 mg capsule Take 1 capsule by mouth 2 times a day.     Allergies:  Diphenhydramine hcl, Levaquin [levofloxacin], and Novocain [procaine]     Patient Active Problem List   Diagnosis    Atrophy of vagina    Baker cyst, right    Benign essential hypertension    Cystocele with uterine prolapse    Dental caries extending into dentin    Kyphoscoliosis    Migraine    Mitral regurgitation    Mixed incontinence urge and stress    Nephrolithiasis    Osteoporosis, post-menopausal    Rheumatoid arthritis (Multi)    Spinal stenosis    Tricuspid regurgitation    Vitamin D deficiency    Felton-Parkinson-White (WPW) syndrome    Female bladder prolapse    BPPV (benign paroxysmal positional vertigo)    Hypercalciuria    Localized osteoarthrosis    Personal history  "of colonic polyps    Osteochondropathy    PAF (paroxysmal atrial fibrillation) (Multi)    Sensorineural hearing loss, bilateral    Neck pain    Back pain    Acute coronary syndrome (Multi)    Shortness of breath    Paroxysmal supraventricular tachycardia (CMS-HCC)     Past Surgical History:   Procedure Laterality Date    BACK SURGERY  2014    Back Surgery    BREAST BIOPSY  2014    Biopsy Breast Open     SECTION, LOW TRANSVERSE  1991    COLONOSCOPY  2014    Colonoscopy (Fiberoptic)    OTHER SURGICAL HISTORY  2022    Percutaneous nephrolithotomy    OTHER SURGICAL HISTORY  2022    Renal lithotripsy     Objective:  Vitals:    05/15/24 0804   BP: 140/86   Pulse: 78   Temp: 36.4 °C (97.6 °F)   Height:     1.511 m (4' 11.5\")  Weight: 53.5 kg (118 lb)     Exam:  Gen: Short middle-age woman in no distress; alert and oriented  HEENT: No scleral icterus; wearing glasses.  Neck: No jugular venous distention or thyromegaly.  Back: Very asymmetric with prominent ridge down right scapular area, perhaps due to severe scoliosis.  Lungs: Clear to auscultation, with no wheezes or rales.  Heart: Regular rhythm without extrasystoles, murmurs or gallops.  Abdomen: Benign, with no organomegaly or masses.  Extremities: Intact distal pulses; no edema.  Neuro: No focal neurologic abnormalities.  Skin: No cutaneous lesions.    Impressions:  1.  Chronic hypertension, under just fair control on beta-blocker and lisinopril therapy.  2.  Scoliosis and degenerative joint disease, followed by orthopedics and rheumatology.  3.  Intermittent ventricular preexcitation, classic for a posteroseptal accessory pathway.  She is felt to be at low risk of sudden death, as she had no preexcited beats when she was in atrial fibrillation 2019.  She has generally had good control of her symptoms on beta-blocker therapy.  Her event monitor from last month showed some possible short runs of orthodromic AV " reciprocating tachycardia.  4.  Paroxysmal atrial fibrillation with single lifetime episode in December 2019.  5.  Urologic problems, with nephrolithiasis and prior surgeries per Dr. Dano Alejandro.  6.  Recent atypical chest pain, but known normal coronary arteries by catheterization in 2019.    Recommendations:  1.  I spoke with the patient and her , and we elected to simply continue her on Toprol-XL 50 mg daily from a rhythm standpoint.  2.  If she has recurrent hospitalizations because of sustained palpitations, she is free to call the office and I will start her on flecainide at 50 mg p.o. twice daily, then have her undergo a stress test on flecainide therapy.  3.  I do not yet see any clear indication for EP testing or ablation.  4.  The patient will follow-up again in 1 year.      Christofer Dalton MD

## 2024-05-28 ENCOUNTER — OFFICE VISIT (OUTPATIENT)
Dept: PRIMARY CARE | Facility: CLINIC | Age: 69
End: 2024-05-28
Payer: COMMERCIAL

## 2024-05-28 VITALS
DIASTOLIC BLOOD PRESSURE: 76 MMHG | HEIGHT: 60 IN | RESPIRATION RATE: 16 BRPM | HEART RATE: 80 BPM | BODY MASS INDEX: 22.93 KG/M2 | OXYGEN SATURATION: 98 % | SYSTOLIC BLOOD PRESSURE: 130 MMHG | WEIGHT: 116.8 LBS | TEMPERATURE: 97.6 F

## 2024-05-28 DIAGNOSIS — I10 BENIGN ESSENTIAL HYPERTENSION: ICD-10-CM

## 2024-05-28 DIAGNOSIS — M17.0 PRIMARY OSTEOARTHRITIS OF BOTH KNEES: ICD-10-CM

## 2024-05-28 DIAGNOSIS — J98.4 RESTRICTIVE LUNG DISEASE DUE TO KYPHOSCOLIOSIS: ICD-10-CM

## 2024-05-28 DIAGNOSIS — M54.17 LUMBOSACRAL RADICULOPATHY AT L5: ICD-10-CM

## 2024-05-28 DIAGNOSIS — M41.9 RESTRICTIVE LUNG DISEASE DUE TO KYPHOSCOLIOSIS: ICD-10-CM

## 2024-05-28 DIAGNOSIS — M48.05 SPINAL STENOSIS OF THORACOLUMBAR REGION: Primary | ICD-10-CM

## 2024-05-28 DIAGNOSIS — M41.9 KYPHOSCOLIOSIS: ICD-10-CM

## 2024-05-28 PROCEDURE — 1036F TOBACCO NON-USER: CPT | Performed by: INTERNAL MEDICINE

## 2024-05-28 PROCEDURE — 99213 OFFICE O/P EST LOW 20 MIN: CPT | Performed by: INTERNAL MEDICINE

## 2024-05-28 PROCEDURE — 3075F SYST BP GE 130 - 139MM HG: CPT | Performed by: INTERNAL MEDICINE

## 2024-05-28 PROCEDURE — 1160F RVW MEDS BY RX/DR IN RCRD: CPT | Performed by: INTERNAL MEDICINE

## 2024-05-28 PROCEDURE — 3078F DIAST BP <80 MM HG: CPT | Performed by: INTERNAL MEDICINE

## 2024-05-28 PROCEDURE — 1159F MED LIST DOCD IN RCRD: CPT | Performed by: INTERNAL MEDICINE

## 2024-05-28 PROCEDURE — 1126F AMNT PAIN NOTED NONE PRSNT: CPT | Performed by: INTERNAL MEDICINE

## 2024-05-28 ASSESSMENT — ENCOUNTER SYMPTOMS: HYPERTENSION: 1

## 2024-05-28 ASSESSMENT — PAIN SCALES - GENERAL: PAINLEVEL: 0-NO PAIN

## 2024-05-28 NOTE — PROGRESS NOTES
"Subjective   Danielle Deshpande is a 69 y.o. female who presents for Follow-up and Hypertension back pain       Patient states that she went back to taking 1 tablet 50mg of her metoprolol due to the 75mg making her feel lightheaded and balance was off and was harder to think- which has subsided since going back to the 50mg   Patient checks BP at home, readings have been in the 120s/60s   Reviewed Dr. Dalton notes.    Currently asymptomatic and without palpitations.    Patient states that the last 2 weeks she has felt the most like herself besides her other issues that she is seeing ortho for   Having right flank pain, and has gotten to where she can't  her granddaughter - history of kidney stones     Hypertension  This is a recurrent problem. The current episode started more than 1 year ago. The problem is unchanged. The problem is controlled. There are no associated agents to hypertension. There are no known risk factors for coronary artery disease. There are no compliance problems.        Review of Systems   All other systems reviewed and are negative.      Objective   /76   Pulse 80   Temp 36.4 °C (97.6 °F)   Resp 16   Ht 1.511 m (4' 11.5\")   Wt 53 kg (116 lb 12.8 oz)   SpO2 98%   BMI 23.20 kg/m²       Physical Exam  Constitutional:       Appearance: Normal appearance.   HENT:      Head: Normocephalic.   Eyes:      Conjunctiva/sclera: Conjunctivae normal.   Cardiovascular:      Rate and Rhythm: Normal rate and regular rhythm.      Heart sounds: Normal heart sounds.   Pulmonary:      Effort: Pulmonary effort is normal.      Breath sounds: Normal breath sounds.   Musculoskeletal:      Cervical back: Neck supple.   Skin:     General: Skin is warm and dry.   Neurological:      Mental Status: She is alert.         Assessment/Plan   Problem List Items Addressed This Visit       Benign essential hypertension    Kyphoscoliosis    Relevant Orders    Referral to Neurosurgery    Spinal stenosis - " Primary    Relevant Orders    Referral to Neurosurgery     Other Visit Diagnoses       Restrictive lung disease due to kyphoscoliosis        Lumbosacral radiculopathy at L5        Relevant Orders    Referral to Neurosurgery    Primary osteoarthritis of both knees        Relevant Orders    Referral to Orthopaedic Surgery          Encounter Diagnoses   Name Primary?    Benign essential hypertension     Restrictive lung disease due to kyphoscoliosis     Spinal stenosis of thoracolumbar region Yes    Kyphoscoliosis     Lumbosacral radiculopathy at L5     Primary osteoarthritis of both knees      West Pizano, DO

## 2024-06-10 ENCOUNTER — HOSPITAL ENCOUNTER (OUTPATIENT)
Dept: RADIOLOGY | Facility: EXTERNAL LOCATION | Age: 69
Discharge: HOME | End: 2024-06-10

## 2024-06-10 ENCOUNTER — OFFICE VISIT (OUTPATIENT)
Dept: ORTHOPEDIC SURGERY | Facility: CLINIC | Age: 69
End: 2024-06-10
Payer: COMMERCIAL

## 2024-06-10 DIAGNOSIS — M25.562 PAIN IN BOTH KNEES, UNSPECIFIED CHRONICITY: ICD-10-CM

## 2024-06-10 DIAGNOSIS — M17.0 PRIMARY OSTEOARTHRITIS OF BOTH KNEES: Primary | ICD-10-CM

## 2024-06-10 DIAGNOSIS — M25.561 PAIN IN BOTH KNEES, UNSPECIFIED CHRONICITY: ICD-10-CM

## 2024-06-10 PROCEDURE — 1036F TOBACCO NON-USER: CPT | Performed by: EMERGENCY MEDICINE

## 2024-06-10 PROCEDURE — L1812 KO ELASTIC W/JOINTS PRE OTS: HCPCS | Performed by: EMERGENCY MEDICINE

## 2024-06-10 PROCEDURE — 99244 OFF/OP CNSLTJ NEW/EST MOD 40: CPT | Performed by: EMERGENCY MEDICINE

## 2024-06-10 PROCEDURE — 1159F MED LIST DOCD IN RCRD: CPT | Performed by: EMERGENCY MEDICINE

## 2024-06-10 PROCEDURE — 20611 DRAIN/INJ JOINT/BURSA W/US: CPT | Performed by: EMERGENCY MEDICINE

## 2024-06-10 RX ORDER — METHYLPREDNISOLONE ACETATE 40 MG/ML
80 INJECTION, SUSPENSION INTRA-ARTICULAR; INTRALESIONAL; INTRAMUSCULAR; SOFT TISSUE
Status: COMPLETED | OUTPATIENT
Start: 2024-06-10 | End: 2024-06-10

## 2024-06-10 RX ORDER — LIDOCAINE HYDROCHLORIDE 10 MG/ML
4 INJECTION INFILTRATION; PERINEURAL
Status: COMPLETED | OUTPATIENT
Start: 2024-06-10 | End: 2024-06-10

## 2024-06-10 RX ADMIN — METHYLPREDNISOLONE ACETATE 80 MG: 40 INJECTION, SUSPENSION INTRA-ARTICULAR; INTRALESIONAL; INTRAMUSCULAR; SOFT TISSUE at 10:02

## 2024-06-10 RX ADMIN — LIDOCAINE HYDROCHLORIDE 4 ML: 10 INJECTION INFILTRATION; PERINEURAL at 10:02

## 2024-06-10 ASSESSMENT — PAIN - FUNCTIONAL ASSESSMENT: PAIN_FUNCTIONAL_ASSESSMENT: 0-10

## 2024-06-10 ASSESSMENT — PAIN SCALES - GENERAL: PAINLEVEL_OUTOF10: 5 - MODERATE PAIN

## 2024-06-10 ASSESSMENT — PAIN DESCRIPTION - DESCRIPTORS: DESCRIPTORS: DULL;ACHING

## 2024-06-10 NOTE — PROGRESS NOTES
Subjective    Patient ID: Danielle Deshpande is a 69 y.o. female.    Chief Complaint: Pain of the Right Knee (NPV- No injury stats she has bone on bone on both knees. ) and Pain of the Left Knee     Last Surgery: No surgery found  Last Surgery Date: No surgery found    Danielle is a very pleasant 69-year-old female who is here today with her  of 47 years.  She has a history of rheumatoid arthritis and establish care with a new provider back in January.  She takes Plaquenil and is presenting here today after being referred by her PCP, Dr. Pizano.  Patient has been having acute on chronic bilateral knee pain most severe in the left knee and located in both medial compartments.  She has a history of scoliosis and is bowlegged.  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  She had a steroid injection once or twice in the past.  Both worked very well and the last time she had an injection was about 6 years ago.  She takes multiple medications for her rheumatoid arthritis.  She is not currently taking any NSAIDs or Tylenol.  She is interested in a brace if it would help and would like to avoid surgery if possible.        Objective   Right Knee Exam     Muscle Strength   The patient has normal right knee strength.    Tenderness   The patient is experiencing tenderness in the medial joint line.    Range of Motion   The patient has normal right knee ROM.    Tests   Wolfgang:  Medial - negative Lateral - negative   Valgus: positive  Lachman:  Anterior - negative        Other   Erythema: absent  Sensation: normal  Pulse: present  Swelling: mild  Effusion: no effusion present    Comments:  Trace effusion      Left Knee Exam     Muscle Strength   The patient has normal left knee strength.    Tenderness   The patient is experiencing tenderness in the medial joint line.    Range of Motion   The patient has normal left knee ROM.    Tests   Wolfgang:   Medial - negative Lateral - negative   Valgus: positive  Lachman:  Anterior - negative        Other   Erythema: absent  Sensation: normal  Pulse: present  Swelling: mild  Effusion: effusion present    Comments:  Mild effusion            Image Results:  Point of Care Ultrasound  These images are not reportable by radiology and will not be interpreted   by  Radiologists.    X-rays of her bilateral knees were reviewed and interpreted by me on 6/10/2024 and revealed no evidence of acute injuries or fractures.  She does have tricompartmental knee DJD with evidence of chondrocalcinosis and bone spurring.  Her degenerative changes are most severe in the medial compartments.     Patient ID: Danielle Deshpande is a 69 y.o. female.    L Inj/Asp: bilateral knee on 6/10/2024 10:02 AM  Indications: pain and joint swelling  Details: 22 G needle, ultrasound-guided superolateral approach  Medications (Right): 80 mg methylPREDNISolone acetate 40 mg/mL; 4 mL lidocaine 10 mg/mL (1 %)  Medications (Left): 4 mL lidocaine 10 mg/mL (1 %); 80 mg methylPREDNISolone acetate 40 mg/mL  Outcome: tolerated well, no immediate complications  Procedure, treatment alternatives, risks and benefits explained, specific risks discussed. Consent was given by the patient. Immediately prior to procedure a time out was called to verify the correct patient, procedure, equipment, support staff and site/side marked as required. Patient was prepped and draped in the usual sterile fashion.           Assessment/Plan   Encounter Diagnoses:  Primary osteoarthritis of both knees    Pain in both knees, unspecified chronicity    Orders Placed This Encounter    L Inj/Asp    Point of Care Ultrasound     No follow-ups on file.    We discussed her treatment options and agreed to perform bilateral cortisone injections under ultrasound guidance in her knees here today.  The patient tolerated both procedures well without any complications and activity modifications were  reviewed.  She was also given a medial  brace for her left knee to try over the next several weeks and also agreed to begin taking prescription dose Tylenol at 1000 mg 3 times daily.  She will then follow-up with me in about a month to determine her response to this plan and is interested in eventually trying viscosupplementation injections when indicated.    **Patient was prescribed a medial  brace for [medial compartment DJD and genu varum].The patient is ambulatory with or without aid; but, has weakness, instability and/or deformity of their left knee which requires stabilization from this orthosis to improve their function.       Verbal and written instructions for the use, wear schedule, cleaning and application of this item were given.  Patient was instructed that should the brace result in increased pain, decreased sensation, increased swelling, or an overall worsening of their medical condition, to please contact our office immediately.      Orthotic management and training was provided for skin care, modifications due to healing tissues, edema changes, interruption in skin integrity, and safety precautions with the orthosis.**    ** Please excuse any errors in grammar or translation related to this dictation. Voice recognition software was utilized to prepare this document. **       Ang Alfaro MD  [facility]

## 2024-07-01 ENCOUNTER — APPOINTMENT (OUTPATIENT)
Dept: NEUROSURGERY | Facility: CLINIC | Age: 69
End: 2024-07-01
Payer: COMMERCIAL

## 2024-07-01 VITALS
BODY MASS INDEX: 23.79 KG/M2 | HEIGHT: 59 IN | DIASTOLIC BLOOD PRESSURE: 84 MMHG | WEIGHT: 118 LBS | SYSTOLIC BLOOD PRESSURE: 132 MMHG | HEART RATE: 77 BPM

## 2024-07-01 DIAGNOSIS — M48.05 SPINAL STENOSIS OF THORACOLUMBAR REGION: ICD-10-CM

## 2024-07-01 DIAGNOSIS — M54.17 LUMBOSACRAL RADICULOPATHY AT L5: ICD-10-CM

## 2024-07-01 DIAGNOSIS — M41.9 KYPHOSCOLIOSIS: ICD-10-CM

## 2024-07-01 PROCEDURE — 3075F SYST BP GE 130 - 139MM HG: CPT | Performed by: PHYSICIAN ASSISTANT

## 2024-07-01 PROCEDURE — 99204 OFFICE O/P NEW MOD 45 MIN: CPT | Performed by: PHYSICIAN ASSISTANT

## 2024-07-01 PROCEDURE — 1125F AMNT PAIN NOTED PAIN PRSNT: CPT | Performed by: PHYSICIAN ASSISTANT

## 2024-07-01 PROCEDURE — 3079F DIAST BP 80-89 MM HG: CPT | Performed by: PHYSICIAN ASSISTANT

## 2024-07-01 PROCEDURE — 1159F MED LIST DOCD IN RCRD: CPT | Performed by: PHYSICIAN ASSISTANT

## 2024-07-01 PROCEDURE — 1036F TOBACCO NON-USER: CPT | Performed by: PHYSICIAN ASSISTANT

## 2024-07-01 ASSESSMENT — PATIENT HEALTH QUESTIONNAIRE - PHQ9
SUM OF ALL RESPONSES TO PHQ9 QUESTIONS 1 & 2: 0
2. FEELING DOWN, DEPRESSED OR HOPELESS: NOT AT ALL
1. LITTLE INTEREST OR PLEASURE IN DOING THINGS: NOT AT ALL

## 2024-07-01 ASSESSMENT — PAIN SCALES - GENERAL: PAINLEVEL: 3

## 2024-07-01 NOTE — PROGRESS NOTES
[facility] [place]  Department of Neurological Surgery  New Patient Visit    History of Present Illness:  Danielle Deshpande is a 69 y.o. year old female who presents to the spine clinic with bilateral lower extremity numbness and tingling right greater than left.  She states that approximately 1 and half years ago she started to experience dullness in her right foot.  Since this was noticed it has gradually spread proximally as well as included the left leg as well.  It is somewhat symmetric though continues right more significant than left.  She denies any new radiating pain down her leg though she continues with a constant dull ache in right lower lumbar region.  She does have history of thoracolumbar scoliosis surgery in 1980 and single charles with laminar hooks placed with spinal tether wires.  Because of the surgery she has continued in a low level of generalized aches and pains and likely exacerbated by diagnoses of osteoarthritis and rheumatoid arthritis.  She endorses being a  over the past 21 years and raising their 5 kids prior to this, exhibiting how she has spent a long time standing on her feet.  Her symptoms are worsened at the end of the day when she has been standing during this time.  She has noticed the most at night when she lays down and has a sensation that she is still wearing her socks when in fact she has not.  Denies any bowel or bladder concerns or foot drop.  Denies diabetes or chemotherapy.  EMG results obtained showing a mild chronic L5 radiculopathy.    Prior Spine Surgeries: yes, scoliosis long construct ~1980     Physical Therapy: ordered, not begun   Diabetic: no   Osteoporosis: no  Patient's BMI is Body mass index is 24.24 kg/m².    14/14 systems reviewed and negative other than what is listed in the history of present illness    Patient Active Problem List   Diagnosis    Atrophy of vagina    Baker cyst, right    Benign essential hypertension    Cystocele with  uterine prolapse    Dental caries extending into dentin    Kyphoscoliosis    Migraine    Mitral regurgitation    Mixed incontinence urge and stress    Nephrolithiasis    Osteoporosis, post-menopausal    Rheumatoid arthritis (Multi)    Spinal stenosis    Tricuspid regurgitation    Vitamin D deficiency    Felton-Parkinson-White (WPW) syndrome    Female bladder prolapse    BPPV (benign paroxysmal positional vertigo)    Hypercalciuria    Localized osteoarthrosis    Personal history of colonic polyps    Osteochondropathy    PAF (paroxysmal atrial fibrillation) (Multi)    Sensorineural hearing loss, bilateral    Neck pain    Back pain    Acute coronary syndrome (Multi)    Shortness of breath    Paroxysmal supraventricular tachycardia (CMS-HCC)     Past Medical History:   Diagnosis Date    Acute recurrent sinusitis, unspecified 02/04/2022    Acute recurrent sinusitis, unspecified location    Arthritis 2010    Colon polyp 1986    Heart disease     WPW    Hypertension 1993    Other signs and symptoms in breast 06/11/2014    Inversion of nipple    Personal history of other diseases of the circulatory system 04/02/2014    History of hypertension    Personal history of other diseases of the female genital tract 08/14/2020    History of postmenopausal bleeding    Personal history of other diseases of the female genital tract 01/18/2022    History of vaginitis    Personal history of other diseases of the musculoskeletal system and connective tissue 04/02/2014    Personal history of scoliosis    Personal history of other specified conditions 06/11/2014    History of lump of right breast    Personal history of other specified conditions 09/01/2020    History of fatigue    PONV (postoperative nausea and vomiting) 1980    Unspecified hydronephrosis 09/01/2020    Hydronephrosis of right kidney     Past Surgical History:   Procedure Laterality Date    BACK SURGERY  04/02/2014    Back Surgery    BREAST BIOPSY  06/11/2014    Biopsy Breast  Open     SECTION, LOW TRANSVERSE      COLONOSCOPY  2014    Colonoscopy (Fiberoptic)    OTHER SURGICAL HISTORY  2022    Percutaneous nephrolithotomy    OTHER SURGICAL HISTORY  2022    Renal lithotripsy     Social History     Tobacco Use    Smoking status: Never    Smokeless tobacco: Never   Substance Use Topics    Alcohol use: Not Currently     family history includes Cancer in her father; Emphysema in her father; Hypertension in her mother; Non-Hodkins Lymphoma in her mother; Stomach cancer in her mother's brother; Thyroid disease in her mother.    Current Outpatient Medications:     alendronate (Fosamax) 70 mg tablet, Take 1 tablet (70 mg) by mouth every 7 days. On Sundays. Take on an empty stomach. Do not lie down or eat for 1/2 hour after taking., Disp: 51 tablet, Rfl: 0    amitriptyline (Elavil) 10 mg tablet, Take 1 tablet (10 mg) by mouth once daily at bedtime., Disp: 90 tablet, Rfl: 3    biotin 5,000 mcg tablet,disintegrating, Take 1 tablet daily., Disp: , Rfl:     calcium carbonate (Oscal) 500 mg calcium (1,250 mg) tablet, Take 1 tablet (1,250 mg) by mouth once daily., Disp: , Rfl:     cholecalciferol (Vitamin D-3) 50 mcg (2,000 unit) capsule, Take 1 capsule daily, Disp: , Rfl:     cranberry 500 mg capsule, Take 1 capsule by mouth once daily., Disp: , Rfl:     docusate sodium (Colace) 100 mg capsule, Take 1 capsule (100 mg) by mouth once daily., Disp: , Rfl:     estradiol (Estrace) 0.01 % (0.1 mg/gram) vaginal cream, Insert 1 Applicatorful (4 g) into the vagina 2 times a week., Disp: , Rfl:     glucosamine-chondroitin (Osteo Bi-Flex) 250-200 mg tablet, Take 1 tablet by mouth 2 times a day., Disp: , Rfl:     hydroxychloroquine (Plaquenil) 200 mg tablet, Take 1 tablet (200 mg) by mouth once daily., Disp: 90 tablet, Rfl: 1    leflunomide (Arava) 20 mg tablet, Take 1 tablet (20 mg) by mouth once daily., Disp: 180 tablet, Rfl: 1    lisinopril 10 mg tablet, Take 1 tablet (10 mg) by  mouth once daily., Disp: 90 tablet, Rfl: 3    magnesium glycinate 100 mg tablet, Take 1 tablet by mouth once daily., Disp: , Rfl:     metoprolol succinate XL (Toprol-XL) 50 mg 24 hr tablet, Take 1.5 tablets (75 mg) by mouth once daily. Do not crush or chew. (Patient taking differently: Take 1 tablet (50 mg) by mouth once daily. Do not crush or chew.- has been taking 50 mg daily), Disp: 135 tablet, Rfl: 3    multivitamin with minerals tablet, Take 0.5 tablets by mouth once daily., Disp: , Rfl:     turmeric root extract 500 mg capsule, Take 1 capsule by mouth 2 times a day., Disp: , Rfl:   Allergies   Allergen Reactions    Diphenhydramine Hcl Other     Confusion     Levaquin [Levofloxacin] Other     Confusion    Novocain [Procaine] Other     Palpatations       Physical Examination    General: Well developed, awake/alert/oriented x3, no distress, alert and cooperative  Skin: Warm and dry, no lesions, no rashes  ENMT: Mucous membranes moist, no apparent injury, no lesions seen  Head/Neck: Neck Supple, no apparent injury  Respiratory/Thorax: Normal breath sounds with good chest expansion, thorax symmetric  Cardiovascular: No pitting edema, no JVD    Motor Strength: 4+/5 bilateral hip extension, otherwise 5/5 Throughout all extremities    Muscle Bulk: Normal and symmetric in all extremities    Paraspinal muscle spasm/tenderness absent.   Midline tenderness absent    Sensation: Moderate dullness to sensation right medial thigh continuing into calf and across the dorsum of her right foot, otherwise intact to light touch      Results    I personally reviewed and interpreted the imaging results which included dextroscoliosis rigidity lumbosacral and likely right greater than left foraminal stenosis secondary to foraminal impingement    Assessment and Plan:  Danielle Deshpande is a 69 y.o. year old female who presents to the spine clinic with bilateral lower extremity numbness and tingling right greater than left.  She states  that approximately 1 and half years ago she started to experience dullness in her right foot.  Since this was noticed it has gradually spread proximally as well as included the left leg as well.  It is somewhat symmetric though continues right more significant than left.  She denies any new radiating pain down her leg though she continues with a constant dull ache in right lower lumbar region.  She does have history of thoracolumbar scoliosis surgery in 1980 and single charles with laminar hooks placed with spinal tether wires.  Because of the surgery she has continued in a low level of generalized aches and pains and likely exacerbated by diagnoses of osteoarthritis and rheumatoid arthritis.  She endorses being a  over the past 21 years and raising their 5 kids prior to this, exhibiting how she has spent a long time standing on her feet.  Her symptoms are worsened at the end of the day when she has been standing during this time.  She has noticed the most at night when she lays down and has a sensation that she is still wearing her socks when in fact she has not.  Denies any bowel or bladder concerns or foot drop.  Denies diabetes or chemotherapy.  EMG results obtained showing a mild chronic L5 radiculopathy.  With patient's symptoms of numbness unilaterally extending proximally above the knee would likely indicate lumbosacral origin of nerve impingement and not solely upon mechanical stress or peroneal nerve impingement.  Will need to obtain lumbar MRI to evaluate soft tissue/neural evolvement leading to the symptoms.  Also obtain flexion-extension x-rays to evaluate for overall alignment and dynamic stability.  Was previously recommended to physical therapy and I would support this referral as unlikely to worsen her symptoms.      The above clinical summary has been dictated with voice recognition software. It has not been proofread for grammatical errors, typographical mistakes, or other semantic  inconsistencies.    Thank you for visiting our office today. It was our pleasure to take part in your healthcare.     Do not hesitate to call with any questions regarding your plan of care after leaving at [phone] M-F 8am-4pm.     To clinicians, thank you very much for this kind referral. It is a privilege to partner with you in the care of your patients. My office would be delighted to assist you with any further consultations or with questions regarding the plan of care outlined. Do not hesitate to call the office or contact me directly.       Sincerely,      MICHAEL Trevino, PA-C  Associate Physician Assistant, Neurosurgery  Clinical   [facility] [place] [address]    Phone: [phone]  Fax: [phone]

## 2024-07-02 ENCOUNTER — TELEPHONE (OUTPATIENT)
Dept: UROLOGY | Facility: CLINIC | Age: 69
End: 2024-07-02

## 2024-07-02 ENCOUNTER — APPOINTMENT (OUTPATIENT)
Dept: UROLOGY | Facility: CLINIC | Age: 69
End: 2024-07-02
Payer: COMMERCIAL

## 2024-07-02 VITALS
DIASTOLIC BLOOD PRESSURE: 84 MMHG | HEIGHT: 59 IN | HEART RATE: 73 BPM | TEMPERATURE: 97.9 F | WEIGHT: 118 LBS | SYSTOLIC BLOOD PRESSURE: 136 MMHG | BODY MASS INDEX: 23.79 KG/M2

## 2024-07-02 DIAGNOSIS — N39.46 MIXED INCONTINENCE URGE AND STRESS: ICD-10-CM

## 2024-07-02 DIAGNOSIS — N95.8 GENITOURINARY SYNDROME OF MENOPAUSE: ICD-10-CM

## 2024-07-02 DIAGNOSIS — N81.4 CYSTOCELE WITH PROLAPSE: Primary | ICD-10-CM

## 2024-07-02 PROCEDURE — 99213 OFFICE O/P EST LOW 20 MIN: CPT | Performed by: NURSE PRACTITIONER

## 2024-07-02 PROCEDURE — 3079F DIAST BP 80-89 MM HG: CPT | Performed by: NURSE PRACTITIONER

## 2024-07-02 PROCEDURE — 1159F MED LIST DOCD IN RCRD: CPT | Performed by: NURSE PRACTITIONER

## 2024-07-02 PROCEDURE — 1036F TOBACCO NON-USER: CPT | Performed by: NURSE PRACTITIONER

## 2024-07-02 PROCEDURE — 3075F SYST BP GE 130 - 139MM HG: CPT | Performed by: NURSE PRACTITIONER

## 2024-07-02 RX ORDER — ESTRADIOL 0.1 MG/G
1 CREAM VAGINAL 2 TIMES WEEKLY
Qty: 42.5 G | Refills: 5 | Status: SHIPPED | OUTPATIENT
Start: 2024-07-04

## 2024-07-02 NOTE — PATIENT INSTRUCTIONS
Estrogen 1 gram twice weekly per vagina w dispenser  Follow up 1 year  Call nurse line if any issues sooner  Nurse line [phone]

## 2024-07-02 NOTE — PROGRESS NOTES
"07/02/24   [id number]    Pessary check     Subjective      HPI Danielle Deshpande is a 69 y.o. female who presents for pessary check; Last seen 1/2/24 cleaning pessary per self w  assist for stage 4 anterior prolapse, VIVIANA on UDS, hx nephrolithiasis managed by Dr. Alejandro.  Using estrogen cream twice weekly; no abnormal discharge, light pink if she scrapes her self w estrogen dispenser, no erosion noted on exam today;     PMH, PSH, FH, SH reviewed  Rough 6mos w spinal, knee issues, WPW episode earlier in year    Objective     /84   Pulse 73   Temp 36.6 °C (97.9 °F)   Ht 1.486 m (4' 10.5\")   Wt 53.5 kg (118 lb)   BMI 24.24 kg/m²    Physical Exam  Genitourinary:     Comments: No erosion today noted; pessary removed, cleaned and reinserted, good fit;         General: Appears comfortable and in no apparent distress, well nourished  Head: Normocephalic, atraumatic  Neck: trachea midline  Respiratory: respirations unlabored, no wheezes, and no use of accessory muscles  Cardiovascular: at rest no dyspnea, well perfused  Skin: no visible rashes or lesions  Neurologic: grossly intact, oriented to person, place, and time  Psychiatric: mood and affect appropriate  Musculoskeletal: in chair for appt. no difficulty w upper body movement        Assessment/Plan   Problem List Items Addressed This Visit          Genitourinary and Reproductive    Mixed incontinence urge and stress     Other Visit Diagnoses       Cystocele with prolapse    -  Primary    Genitourinary syndrome of menopause        Relevant Medications    estradiol (Estrace) 0.01 % (0.1 mg/gram) vaginal cream (Start on 7/4/2024)            No orders of the defined types were placed in this encounter.     Estrogen 1 gram twice weekly per vagina w dispenser  Follow up 1 year  Call nurse line if any issues sooner  Nurse line [phone]       TRENTON Cordon-CNP  Lab Results   Component Value Date    GLUCOSE 88 04/30/2024    CALCIUM 8.8 04/30/2024    NA " 143 04/30/2024    K 4.0 04/30/2024    CO2 28 04/30/2024     (H) 04/30/2024    BUN 29 (H) 04/30/2024    CREATININE 0.76 04/30/2024

## 2024-07-02 NOTE — TELEPHONE ENCOUNTER
LM for pt to call and schedule yearly F/U appointment with Dr. Alejandro and to get lab draw and X-ray done at least one week prior to appointment.

## 2024-07-10 ENCOUNTER — APPOINTMENT (OUTPATIENT)
Dept: ORTHOPEDIC SURGERY | Facility: CLINIC | Age: 69
End: 2024-07-10
Payer: COMMERCIAL

## 2024-07-10 DIAGNOSIS — M17.10 ARTHRITIS OF KNEE: ICD-10-CM

## 2024-07-10 DIAGNOSIS — M17.0 PRIMARY OSTEOARTHRITIS OF BOTH KNEES: Primary | ICD-10-CM

## 2024-07-10 PROCEDURE — 1036F TOBACCO NON-USER: CPT | Performed by: EMERGENCY MEDICINE

## 2024-07-10 PROCEDURE — L1812 KO ELASTIC W/JOINTS PRE OTS: HCPCS | Performed by: EMERGENCY MEDICINE

## 2024-07-10 PROCEDURE — 99213 OFFICE O/P EST LOW 20 MIN: CPT | Performed by: EMERGENCY MEDICINE

## 2024-07-10 PROCEDURE — 1159F MED LIST DOCD IN RCRD: CPT | Performed by: EMERGENCY MEDICINE

## 2024-07-10 ASSESSMENT — PAIN - FUNCTIONAL ASSESSMENT: PAIN_FUNCTIONAL_ASSESSMENT: 0-10

## 2024-07-10 ASSESSMENT — PAIN DESCRIPTION - DESCRIPTORS: DESCRIPTORS: OTHER (COMMENT)

## 2024-07-10 ASSESSMENT — PAIN SCALES - GENERAL: PAINLEVEL_OUTOF10: 0 - NO PAIN

## 2024-07-10 NOTE — PROGRESS NOTES
Subjective    Patient ID: Danielle eDshpande is a 69 y.o. female.    Chief Complaint: Follow-up of the Left Knee (Wears knee brace just about everyday and it seems to help. States CSI seemed to help some on both knees. ) and Follow-up of the Right Knee (Still feels a little stiff. )     Last Surgery: No surgery found  Last Surgery Date: No surgery found    Danielle is a very pleasant 69-year-old female who is here today with her  of 47 years.  She has a history of rheumatoid arthritis and establish care with a new provider back in January.  She takes Plaquenil and is presenting here today after being referred by her PCP, Dr. Pizano.  Patient has been having acute on chronic bilateral knee pain most severe in the left knee and located in both medial compartments.  She has a history of scoliosis and is bowlegged.  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  She had a steroid injection once or twice in the past.  Both worked very well and the last time she had an injection was about 6 years ago.  She takes multiple medications for her rheumatoid arthritis.  She is not currently taking any NSAIDs or Tylenol.  She is interested in a brace if it would help and would like to avoid surgery if possible. We agreed to perform bilateral cortisone injections under ultrasound guidance in her knees here today.  The patient tolerated both procedures well without any complications and activity modifications were reviewed.  She was also given a medial  brace for her left knee to try over the next several weeks and also agreed to begin taking prescription dose Tylenol at 1000 mg 3 times daily.  She will then follow-up with me in about a month to determine her response to this plan and is interested in eventually trying viscosupplementation injections when indicated.    Update on 7/10/2024.  The patient is coming back in for a follow-up visit for  her acute on chronic bilateral knee pain secondary to medial compartment DJD.  We performed bilateral knee cortisone injections about a month ago and she is here today for follow-up visit.  Overall she reports that the left knee is feeling a little bit better.  She thinks that it is about 50% better overall and she is very happy with the medial  brace.  Her right knee is only about 30% better and she is still having it moderate to severe amount of stiffness in right knee joint.  She denies any traumatic events or injuries.  No other complaints here today.        Objective   Right Knee Exam     Muscle Strength   The patient has normal right knee strength.    Tenderness   The patient is experiencing tenderness in the medial joint line.    Range of Motion   The patient has normal right knee ROM.    Tests   Wolfgang:  Medial - negative Lateral - negative   Valgus: positive  Lachman:  Anterior - negative        Other   Erythema: absent  Sensation: normal  Pulse: present  Swelling: mild  Effusion: no effusion present    Comments:  Trace effusion      Left Knee Exam     Muscle Strength   The patient has normal left knee strength.    Tenderness   The patient is experiencing tenderness in the medial joint line.    Range of Motion   The patient has normal left knee ROM.    Tests   Wolfgang:  Medial - negative Lateral - negative   Valgus: positive  Lachman:  Anterior - negative        Other   Erythema: absent  Sensation: normal  Pulse: present  Swelling: mild  Effusion: effusion present    Comments:  Mild effusion        Exam grossly unchanged today    Image Results:  No new imaging today    Patient ID: Danielle Deshpande is a 69 y.o. female.    Procedures    Assessment/Plan   Encounter Diagnoses:  Primary osteoarthritis of both knees    No orders of the defined types were placed in this encounter.    No follow-ups on file.    We discussed her treatment options and agreed that she has not responded very well to the  cortisone injections.  We therefore agreed to pursue the approval of viscosupplementation injections in both knees.  She will continue her pain medication regimen and we are also going to fit her with a right medial  brace since she has responded very well using the one on her left knee.  I will then follow-up with her once her gel injections are approved and arrive.    **Patient was prescribed a medial  brace for [medial compartment DJD and genu varum].The patient is ambulatory with or without aid; but, has weakness, instability and/or deformity of their right knee which requires stabilization from this orthosis to improve their function.       Verbal and written instructions for the use, wear schedule, cleaning and application of this item were given.  Patient was instructed that should the brace result in increased pain, decreased sensation, increased swelling, or an overall worsening of their medical condition, to please contact our office immediately.      Orthotic management and training was provided for skin care, modifications due to healing tissues, edema changes, interruption in skin integrity, and safety precautions with the orthosis.**    ** Please excuse any errors in grammar or translation related to this dictation. Voice recognition software was utilized to prepare this document. **       Ang Alfaro MD  [facility]

## 2024-08-05 ENCOUNTER — LAB (OUTPATIENT)
Dept: LAB | Facility: LAB | Age: 69
End: 2024-08-05
Payer: COMMERCIAL

## 2024-08-05 ENCOUNTER — TELEPHONE (OUTPATIENT)
Dept: UROLOGY | Facility: CLINIC | Age: 69
End: 2024-08-05

## 2024-08-05 ENCOUNTER — HOSPITAL ENCOUNTER (OUTPATIENT)
Dept: RADIOLOGY | Facility: CLINIC | Age: 69
Discharge: HOME | End: 2024-08-05
Payer: COMMERCIAL

## 2024-08-05 DIAGNOSIS — N20.0 NEPHROLITHIASIS: ICD-10-CM

## 2024-08-05 LAB
ANION GAP SERPL CALC-SCNC: 11 MMOL/L (ref 10–20)
BUN SERPL-MCNC: 23 MG/DL (ref 6–23)
CALCIUM SERPL-MCNC: 9.1 MG/DL (ref 8.6–10.3)
CHLORIDE SERPL-SCNC: 103 MMOL/L (ref 98–107)
CO2 SERPL-SCNC: 30 MMOL/L (ref 21–32)
CREAT SERPL-MCNC: 0.72 MG/DL (ref 0.5–1.05)
EGFRCR SERPLBLD CKD-EPI 2021: >90 ML/MIN/1.73M*2
GLUCOSE SERPL-MCNC: 80 MG/DL (ref 74–99)
POTASSIUM SERPL-SCNC: 3.9 MMOL/L (ref 3.5–5.3)
SODIUM SERPL-SCNC: 140 MMOL/L (ref 136–145)

## 2024-08-05 PROCEDURE — 74018 RADEX ABDOMEN 1 VIEW: CPT

## 2024-08-05 PROCEDURE — 80048 BASIC METABOLIC PNL TOTAL CA: CPT

## 2024-08-05 PROCEDURE — 36415 COLL VENOUS BLD VENIPUNCTURE: CPT

## 2024-08-05 PROCEDURE — 74018 RADEX ABDOMEN 1 VIEW: CPT | Performed by: RADIOLOGY

## 2024-08-05 NOTE — TELEPHONE ENCOUNTER
Patient has appt next week and JJ's note states to do a BMP and Xray.  Need orders placed to call patient

## 2024-08-13 ENCOUNTER — APPOINTMENT (OUTPATIENT)
Dept: UROLOGY | Facility: CLINIC | Age: 69
End: 2024-08-13
Payer: COMMERCIAL

## 2024-08-13 VITALS
SYSTOLIC BLOOD PRESSURE: 136 MMHG | WEIGHT: 114.6 LBS | HEART RATE: 86 BPM | BODY MASS INDEX: 23.54 KG/M2 | DIASTOLIC BLOOD PRESSURE: 83 MMHG | TEMPERATURE: 98.2 F

## 2024-08-13 DIAGNOSIS — N20.0 NEPHROLITHIASIS: ICD-10-CM

## 2024-08-13 PROCEDURE — G2211 COMPLEX E/M VISIT ADD ON: HCPCS | Performed by: UROLOGY

## 2024-08-13 PROCEDURE — 3079F DIAST BP 80-89 MM HG: CPT | Performed by: UROLOGY

## 2024-08-13 PROCEDURE — 3075F SYST BP GE 130 - 139MM HG: CPT | Performed by: UROLOGY

## 2024-08-13 PROCEDURE — 99213 OFFICE O/P EST LOW 20 MIN: CPT | Performed by: UROLOGY

## 2024-08-13 PROCEDURE — 1159F MED LIST DOCD IN RCRD: CPT | Performed by: UROLOGY

## 2024-08-13 NOTE — PROGRESS NOTES
Subjective   Patient ID: Danielle Deshpande is a 69 y.o. female who presents for Nephrolithiasis. Last seen 9/26/23 when Patient is doing well with no concerning issues. We discussed her abdominal Xray results together. No interventions needed at this time. I would like her to follow up in 1 year with a KUB and a BMP.     HPI  Patient is doing well. We discussed the patients labs and imaging. Patient denies urinary symptoms. Patient denies hematuria and dysuria. She has some intermittent right-sided back pain but thinks it is related to her spine, not her kidney. Patient is taking cranberry extract regularly.     Patient has had some issues with her back secondary to scoliosis and some neuropathy.     She is continuing to work and renovating her house that she has lived in for almost 40 years.     KUB Results  IMPRESSION:  Bilateral renal calculi right more prominent than left, similar to  prior study.    Lab Results   Component Value Date    BUN 23 08/05/2024    CREATININE 0.72 08/05/2024    EGFR >90 08/05/2024       Review of Systems  A 12 system review was completed and is negative with the exception of those signs and symptoms noted in the history of present illness.    Objective   Physical Exam  General: in NAD, appears stated age  Head: normocephalic, atraumatic  Respiratory: normal effort, no use of accessory muscles  Cardiovascular: no edema noted  Skin: normal turgor, no rashes  Neurologic: grossly intact, oriented to person/place/time  Psychiatric: mode and affect appropriate     Assessment/Plan   Problem List Items Addressed This Visit             ICD-10-CM    Nephrolithiasis N20.0    Relevant Orders    Follow Up In Urology    XR abdomen 1 view    Basic Metabolic Panel     We discussed the patients labs and imaging. All questions and concerns were addressed. Patient verbalizes understanding and has no other questions at this time.  Order KUB and BMP for 1 year and follow-up with results.       Gopi  Attestation  By signing my name below, I, Haley March , Scribe   attest that this documentation has been prepared under the direction and in the presence of Dano Alejandro MD.

## 2024-08-27 ENCOUNTER — HOSPITAL ENCOUNTER (OUTPATIENT)
Dept: RADIOLOGY | Facility: CLINIC | Age: 69
Discharge: HOME | End: 2024-08-27
Payer: COMMERCIAL

## 2024-08-27 DIAGNOSIS — M54.17 LUMBOSACRAL RADICULOPATHY AT L5: ICD-10-CM

## 2024-08-27 DIAGNOSIS — M41.9 KYPHOSCOLIOSIS: ICD-10-CM

## 2024-08-27 PROCEDURE — 72120 X-RAY BEND ONLY L-S SPINE: CPT

## 2024-08-28 NOTE — PROGRESS NOTES
"Subjective   Patient ID: [id number]   Danielle Deshpande is a 69 y.o. female known to have RA, OA and chondrocalcinosis of the knees, Osteopenia, vitamin D def, kyphoscoliosis, spinal stenosis, kidney stones, hx of hypercalcemia, migraines, SNHL, HTN, WPW, PAF, and BPPV, presents for follow up     Current rheum meds:  -  mg QD  - Leflunomide 10 mg daily  - Fosamax 70 mg weekly started in 1/24     Previous IS:  - None     HPI   Saw cardio fo PWP acting up, can put her on meds if it becomes recurrent   Saw sports medicine Dr. Alfaro for knee OA, got injections 1/3 of the gel ones,  and a brace    Saw spine med for tingling -> ordered MRI and xrays and PT, referred to Dr. Adamson   Saw urology for cystocele prolapse   Some joint pains in the morning, goes away in a few minutes, related to the weather and activity  Swelling in the ankles, end of the day   Morning stiffness of a few minutes   Compliant with meds  No side effects reported  No episodes of eye inflammation  No sicca sx  No chest pain, cough or dyspnea  No infections    ROS:  As per HPI     Rheum hx:  Last eye exam in 9/23  She was seeing Dr. Umana for RA since around 2020, she just recently retired in 12/23  She had pain in her knees, hands (mostly in her MCPs) and feet and diagnosed as RA  No serology available  Patient has a history of kidney stones s/p nephrostomies   Reports currently pains in her knees, osteoarthritis \"bone on bone\", takes osteobiflex and that helps   Right foot pain, mid foot on the dorsal side, has not seen podiatry in a while   Joint pains are mechanical in nature  MS lasts around 1 hour, takes a shower   Swelling sometimes in her MCPs and feet   Feels like her symptoms and swelling improved with the medications     + Numbness in both legs   + occasional dry mouth   + photosensitivity on her arms   + nasal ulcers  + has problems with her     History of falls / fractures: yes fracture of the tibia and foot, without a cause "   Loss of height: yes 3 inches   Tobacco: no  Alcohol: no  Vitamin D supplementation: yes  Calcium supplementation: yes  Weight bearing exercise: no  Previous or planned invasive jaw surgery: no  History of radiation: no  Chronic steroid use: no  History of RA: yes  GERD: yes  Emelyn-en-y: no  CKD / GFR <30: yes  Parental hip fracture: yes, father    PSH: Spinal fusion, C sections, nephrostomies, finger surgery   Allergies: As per list   Habits: No alcohol, no tobacco, no drugs  Social hx: , 5 kids, healthy, works as a    FHx: No family history of autoimmune diseases     Patient Active Problem List   Diagnosis    Atrophy of vagina    Baker cyst, right    Benign essential hypertension    Cystocele with uterine prolapse    Dental caries extending into dentin    Kyphoscoliosis    Migraine    Mitral regurgitation    Mixed incontinence urge and stress    Nephrolithiasis    Osteoporosis, post-menopausal    Rheumatoid arthritis (Multi)    Spinal stenosis    Tricuspid regurgitation    Vitamin D deficiency    Felton-Parkinson-White (WPW) syndrome    Female bladder prolapse    BPPV (benign paroxysmal positional vertigo)    Hypercalciuria    Localized osteoarthrosis    Personal history of colonic polyps    Osteochondropathy    PAF (paroxysmal atrial fibrillation) (Multi)    Sensorineural hearing loss, bilateral    Neck pain    Back pain    Acute coronary syndrome (Multi)    Shortness of breath    Paroxysmal supraventricular tachycardia (CMS-HCC)      Past Medical History:   Diagnosis Date    Acute recurrent sinusitis, unspecified 02/04/2022    Acute recurrent sinusitis, unspecified location    Arthritis 2010    Colon polyp 1986    Heart disease     WPW    Hypertension 1993    Other signs and symptoms in breast 06/11/2014    Inversion of nipple    Personal history of other diseases of the circulatory system 04/02/2014    History of hypertension    Personal history of other diseases of the female genital  tract 2020    History of postmenopausal bleeding    Personal history of other diseases of the female genital tract 2022    History of vaginitis    Personal history of other diseases of the musculoskeletal system and connective tissue 2014    Personal history of scoliosis    Personal history of other specified conditions 2014    History of lump of right breast    Personal history of other specified conditions 2020    History of fatigue    PONV (postoperative nausea and vomiting) 1980    Unspecified hydronephrosis 2020    Hydronephrosis of right kidney     Past Surgical History:   Procedure Laterality Date    BACK SURGERY  2014    Back Surgery    BREAST BIOPSY  2014    Biopsy Breast Open     SECTION, LOW TRANSVERSE  1991    COLONOSCOPY  2014    Colonoscopy (Fiberoptic)    OTHER SURGICAL HISTORY  2022    Percutaneous nephrolithotomy    OTHER SURGICAL HISTORY  2022    Renal lithotripsy     Social History     Socioeconomic History    Marital status:      Spouse name: Not on file    Number of children: Not on file    Years of education: Not on file    Highest education level: Not on file   Occupational History    Not on file   Tobacco Use    Smoking status: Never    Smokeless tobacco: Never   Vaping Use    Vaping status: Never Used   Substance and Sexual Activity    Alcohol use: Not Currently    Drug use: Never    Sexual activity: Not Currently     Partners: Male     Birth control/protection: Post-menopausal   Other Topics Concern    Not on file   Social History Narrative    Not on file     Social Determinants of Health     Financial Resource Strain: Low Risk  (2024)    Overall Financial Resource Strain (CARDIA)     Difficulty of Paying Living Expenses: Not very hard   Food Insecurity: Not on file   Transportation Needs: No Transportation Needs (2024)    PRAPARE - Transportation     Lack of Transportation (Medical): No     Lack of  Transportation (Non-Medical): No   Physical Activity: Not on file   Stress: Not on file   Social Connections: Not on file   Intimate Partner Violence: Not on file   Housing Stability: Low Risk  (4/13/2024)    Housing Stability Vital Sign     Unable to Pay for Housing in the Last Year: No     Number of Places Lived in the Last Year: 1     Unstable Housing in the Last Year: No     Allergies   Allergen Reactions    Diphenhydramine Hcl Other     Confusion     Levaquin [Levofloxacin] Other     Confusion    Novocain [Procaine] Other     Palpatations      Current Outpatient Medications:     alendronate (Fosamax) 70 mg tablet, Take 1 tablet (70 mg) by mouth every 7 days. On Sundays. Take on an empty stomach. Do not lie down or eat for 1/2 hour after taking., Disp: 51 tablet, Rfl: 0    amitriptyline (Elavil) 10 mg tablet, Take 1 tablet (10 mg) by mouth once daily at bedtime., Disp: 90 tablet, Rfl: 3    biotin 5,000 mcg tablet,disintegrating, Take 1 tablet daily., Disp: , Rfl:     calcium carbonate (Oscal) 500 mg calcium (1,250 mg) tablet, Take 1 tablet (1,250 mg) by mouth once daily., Disp: , Rfl:     cholecalciferol (Vitamin D-3) 50 mcg (2,000 unit) capsule, Take 1 capsule daily, Disp: , Rfl:     cranberry 500 mg capsule, Take 1 capsule by mouth once daily., Disp: , Rfl:     docusate sodium (Colace) 100 mg capsule, Take 1 capsule (100 mg) by mouth once daily., Disp: , Rfl:     estradiol (Estrace) 0.01 % (0.1 mg/gram) vaginal cream, Insert 0.25 Applicatorfuls (1 g) into the vagina 2 times a week., Disp: 42.5 g, Rfl: 5    glucosamine-chondroitin (Osteo Bi-Flex) 250-200 mg tablet, Take 1 tablet by mouth 2 times a day., Disp: , Rfl:     leflunomide (Arava) 20 mg tablet, Take 1 tablet (20 mg) by mouth once daily., Disp: 180 tablet, Rfl: 1    magnesium glycinate 100 mg tablet, Take 1 tablet by mouth once daily., Disp: , Rfl:     metoprolol succinate XL (Toprol-XL) 50 mg 24 hr tablet, Take 1.5 tablets (75 mg) by mouth once daily.  "Do not crush or chew. (Patient taking differently: Take 1 tablet (50 mg) by mouth once daily. Do not crush or chew.- has been taking 50 mg daily), Disp: 135 tablet, Rfl: 3    multivitamin with minerals tablet, Take 0.5 tablets by mouth once daily., Disp: , Rfl:     turmeric root extract 500 mg capsule, Take 1 capsule by mouth 2 times a day., Disp: , Rfl:     lisinopril 10 mg tablet, Take 1 tablet (10 mg) by mouth once daily., Disp: 90 tablet, Rfl: 3     Objective   Visit Vitals  /77   Pulse 67   Ht 1.486 m (4' 10.5\")   Wt 51.7 kg (114 lb)   SpO2 96%   BMI 23.42 kg/m²   OB Status Postmenopausal   Smoking Status Never   BSA 1.46 m²     Physical Exam:  General: AAOx3, Cooperative  Head: normocephalic, atraumatic, no hair loss   Eyes: EOMI, conjunctiva clear, sclera white, anicteric  Ears: hearing intact  Nose: no deformity, no crusting   Throat/Mouth: No oral deformities, no cheek swelling, mucosa appear moist, no oral ulcers noted. Has some teeth missing (recently extracted) denture on the bottom   Skin: No rashes   MSK: Upper Extremities:  Hand/Fingers: Hypertrophy of bilateral 2nd-4th MCP. TTP of DIPs. No erythema, edema, tenderness or warmth at DIP, PIP, or MCP joints, FROM grossly. Good hand . Heberden's nodes bilaterally   Wrists: No erythema, edema, warmth or tenderness at wrist, FROM grossly  Elbows: No tenderness, edema, erythema or warmth at elbows, FROM grossly. No nodules   Shoulders: No edema, erythema, tenderness or warmth at shoulders. FROM  Lower Extremities:   Hips: No obvious deformities. No joint tenderness, normal ROM grossly. No trochanteric bursae TTP  Knees: No tenderness, deformities, edema, rashes, or warmth, normal ROM grossly. No crepitus, no pes anserine bursa TTP   Ankles, feet: Flexed toes. No tenderness, edema, erythema, ulceration, or warmth at the ankle or MTP/IP joints, normal ROM grossly  Spine: Significant scoliosis from the thoracic area down, to the right. No spinal " tenderness to palpation. No SI joint tenderness     Lab Results   Component Value Date    WBC 4.0 (L) 09/05/2024    HGB 12.9 09/05/2024    HCT 40.2 09/05/2024    MCV 93 09/05/2024     09/05/2024        Chemistry    Lab Results   Component Value Date/Time     09/05/2024 1612    K 3.7 09/05/2024 1612     09/05/2024 1612    CO2 29 09/05/2024 1612    BUN 20 09/05/2024 1612    CREATININE 0.73 09/05/2024 1612    Lab Results   Component Value Date/Time    CALCIUM 9.7 09/05/2024 1612    ALKPHOS 50 09/05/2024 1612    AST 22 09/05/2024 1612    ALT 14 09/05/2024 1612    BILITOT 0.6 09/05/2024 1612        Lab Results   Component Value Date    NEUTROABS 2.12 09/05/2024     Lab Results   Component Value Date    HEPCAB Nonreactive 10/13/2023      Lab Results   Component Value Date    ALT 14 09/05/2024    AST 22 09/05/2024    ALKPHOS 50 09/05/2024    BILITOT 0.6 09/05/2024     Lab Results   Component Value Date    URICACID 4.3 05/14/2021      Lab Results   Component Value Date    PTH 45.5 02/29/2024    CALCIUM 9.7 09/05/2024    PHOS 3.3 02/29/2024     MR lumbar spine wo IV contrast  Narrative: Interpreted By:  Carlos Roca,   STUDY:  MR LUMBAR SPINE WO IV CONTRAST;  9/4/2024 4:23 pm      INDICATION:  Signs/Symptoms:right sided leg numbness medial thorough thigh and  calf. ,M54.17 Radiculopathy, lumbosacral region,M41.9 Scoliosis,  unspecified      COMPARISON:  None.      ACCESSION NUMBER(S):  [id number]      ORDERING CLINICIAN:  HORTENCIA MCPHERSON      TECHNIQUE:  The lumbar spine was studied in the sagital, axial and coronal planes  utiliing T1 and T2 weighted images.      FINDINGS:  There is marked dextroscoliosis with its apex at L2/L3.      Single Mendez charles fixation extending from L4 lamina to the  midthoracic spine. Vertebral body height is normal. There are type 2  rotatory subluxation of T12 to the right relative to L1. Discogenic  marrow signal changes at L4/L5.. The conus and sacrum are  normal.  Images at each interspace reveal the following: T12/L1  Rotatory subluxation of T12 to the right relative to L1. Mild facet  hypertrophy without canal or foraminal narrowing L1/L2  Rotatory subluxation of L1 to the right relative to L2. Mild facet  hypertrophy without canal or foraminal narrowing L2/L3  Mild facet hypertrophy without canal or foraminal narrowing  L3/L4  Rotatory subluxation of L3 to the left relative to L4. Mild facet  hypertrophy without canal or foraminal narrowing L4/L5  Rotatory subluxation of L4 to the left relative to L5. Type 2 marrow  signal changes bilateral facet hypertrophy with bulging  intervertebral disc. No measurable central canal stenosis. Marked  bilateral neural foraminal narrowing L5/S1  Bilateral facet hypertrophy and circumferential bulging  intervertebral disc without canal or foraminal narrowing.          Impression: * Previous Mendez charles placement as described with residual  rotatory subluxation and dextroscoliosis with its apex at L2/L3 *No  evidence of focal disc herniation or measurable canal stenosis  *Foraminal narrowing as described.      MACRO:  none      Signed by: Carlos Roca 9/5/2024 9:02 AM  Dictation workstation:   [id number]     === 09/12/23 ===  XR ABDOMEN 1 VIEW  No definite change from CT 24 August 2022     === 10/24/18 ===  MRI CERVICAL SPINE WO CONTRAST  Mild multifocal degenerative changes as described above     === 10/31/23 ===  DEXA BONE DENSITY  LEFT FEMUR -TOTAL BMD: 0.754 T-Score -2.0   Bone Mineral Density change vs baseline: -11.3%  Bone Mineral Density change vs previous: -2.3%      LEFT FEMUR -NECK BMD: 0.798 T-Score -1.7     LEFT FOREARM Total BMD: 0.616 T-Score -1.0   UD Bone Mineral Density: 0.394 T-Score -1.5  1/3 BMD: 0.829 T-Score -0.5   Bone Mineral Density change vs baseline:  -1%  Bone Mineral Density change vs previous:  Not reported     10-year Fracture Risk:  Major Osteoporotic Fracture: 13.2%  Hip Fracture:  2.3%      Assessment/Plan    This is a This is a 69 y.o. female, known to have seronegative RA, chondrocalcinosis of the hands, Osteopenia, vitamin D def, OA, kyphoscoliosis, spinal stenosis, presents for follow up  Last seen in 5/24     Patient with a prior diagnosis of seronegative RA. She also has some symptoms suggestive of CTD. RF, CCP, SANJIV neg. She has osteopenia with low FRAX but 2 fragility fractures without injury. Discussion with the patient about BP and explained risks and benefits. Started on Fosamax     Labs:  9/24: WBCs 4K, ALC 0.93, rest of CBC, CMP, ESR, CRP wnl  4/24: Prt 6.2, rest of CMP, CBC, ESR, CRP wnl  4/24: CBC, CMP, Mg, ProBNP, trop wnl  2/24: CRP, SPEP, Ph, PTH wnl  1/24: ALC 0.78, Prt 6.2, rest of CMP, CBC, ESR, C3, C4, Uprt, vitamin D wnl  RF, CCP, SANJIV wnl  10/23: CMP wnl. Hep C neg  8/23: CBC, CMP, TSH wnl     Imaging:  EMG LL: Chronic mild right L5 radiculopathy with no active denervation    Xray hands: Arthritic changes are most severe involving the 3rd metacarpal  phalangeal joints, with left hand arthritis more severe than right. Triangular fibrocartilage calcifications most likely reflect calcium pyrophosphate deposition.    Xray feet: Periarticular erosions involving left 2nd through 4th metatarsal  heads and right 2nd and 3rd metatarsal heads. Calcaneal spurs and plantar fascial calcification    Xray knees: Hypertrophic degenerative arthritic changes are noted. Small bilateral effusions. Menisci are calcified bilaterally    DEXA 10/23:  LEFT FEMUR -TOTAL BMD: 0.754 T-Score -2.0     LEFT FEMUR -NECK BMD: 0.798 T-Score -1.7  1/3 forearm BMD: 0.829 T-Score -0.5   FRAX Major Osteoporotic Fracture: 13.2%. Hip Fracture: 2.3%    # RA, doing well, no active synovitis on exam today  # OA   # Osteopenia with low FRAX but reports hx of 2 fragility fractures. Started on Fosamax, secondary work up is negative    # Long term use of IS  # Vitamin D def, replenished   # LL neuropathy, EMG -> L5  radiculopathy, might get surgery with Dr. Adamson     - Keep  mg every day (weight 53.5 kg), refilled  - If cardiology recommends stopping HCQ, then we can stop it  - Eye doctor for HCQ toxicity, due this year, has been over 2 years that she had it examined   - Leflunomide 20 mg daily, refilled  - Continue Fosomax 70 mg once weekly   - Referred to PT for knee OA  - Follow up with ortho for knee OA, will be getting more gel shots   - Next DEXA due 1/26, 2 years after starting tx   - Labs before next dov (CBC,CMP, ESR, CRP)  - Continue vitamin D  - Follow up with Dr. Adamson for possible back surgery     RTC in 6 months    Plan, including risks and benefits, was discussed with the patient, informed on how to reach us.     To schedule an appointment, call [phone]  To reach the rheumatology office, call [phone]    Laila Alonso MD   Division of Rheumatology  [facility]

## 2024-09-04 ENCOUNTER — HOSPITAL ENCOUNTER (OUTPATIENT)
Dept: RADIOLOGY | Facility: EXTERNAL LOCATION | Age: 69
Discharge: HOME | End: 2024-09-04

## 2024-09-04 ENCOUNTER — HOSPITAL ENCOUNTER (OUTPATIENT)
Dept: RADIOLOGY | Facility: CLINIC | Age: 69
Discharge: HOME | End: 2024-09-04
Payer: COMMERCIAL

## 2024-09-04 ENCOUNTER — APPOINTMENT (OUTPATIENT)
Dept: RADIOLOGY | Facility: CLINIC | Age: 69
End: 2024-09-04
Payer: COMMERCIAL

## 2024-09-04 ENCOUNTER — APPOINTMENT (OUTPATIENT)
Dept: ORTHOPEDIC SURGERY | Facility: CLINIC | Age: 69
End: 2024-09-04
Payer: COMMERCIAL

## 2024-09-04 DIAGNOSIS — M41.9 KYPHOSCOLIOSIS: ICD-10-CM

## 2024-09-04 DIAGNOSIS — M17.0 PRIMARY OSTEOARTHRITIS OF BOTH KNEES: Primary | ICD-10-CM

## 2024-09-04 DIAGNOSIS — M54.17 LUMBOSACRAL RADICULOPATHY AT L5: ICD-10-CM

## 2024-09-04 PROCEDURE — 72148 MRI LUMBAR SPINE W/O DYE: CPT

## 2024-09-04 PROCEDURE — 1159F MED LIST DOCD IN RCRD: CPT | Performed by: EMERGENCY MEDICINE

## 2024-09-04 PROCEDURE — 20611 DRAIN/INJ JOINT/BURSA W/US: CPT | Performed by: EMERGENCY MEDICINE

## 2024-09-04 PROCEDURE — 72148 MRI LUMBAR SPINE W/O DYE: CPT | Performed by: RADIOLOGY

## 2024-09-04 PROCEDURE — 1036F TOBACCO NON-USER: CPT | Performed by: EMERGENCY MEDICINE

## 2024-09-04 ASSESSMENT — PAIN DESCRIPTION - DESCRIPTORS: DESCRIPTORS: ACHING

## 2024-09-04 ASSESSMENT — PAIN - FUNCTIONAL ASSESSMENT: PAIN_FUNCTIONAL_ASSESSMENT: 0-10

## 2024-09-04 ASSESSMENT — PAIN SCALES - GENERAL: PAINLEVEL_OUTOF10: 5 - MODERATE PAIN

## 2024-09-04 NOTE — PROGRESS NOTES
Subjective    Patient ID: Danielle Deshpande is a 69 y.o. female.    Chief Complaint: Pain of the Left Knee (1st Euflexxa injection - Dept provided - B/L knees) and Pain of the Right Knee     Last Surgery: No surgery found  Last Surgery Date: No surgery found    Danielle is a very pleasant 69-year-old female who is here today with her  of 47 years.  She has a history of rheumatoid arthritis and establish care with a new provider back in January.  She takes Plaquenil and is presenting here today after being referred by her PCP, Dr. Pizano.  Patient has been having acute on chronic bilateral knee pain most severe in the left knee and located in both medial compartments.  She has a history of scoliosis and is bowlegged.  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  She had a steroid injection once or twice in the past.  Both worked very well and the last time she had an injection was about 6 years ago.  She takes multiple medications for her rheumatoid arthritis.  She is not currently taking any NSAIDs or Tylenol.  She is interested in a brace if it would help and would like to avoid surgery if possible. We agreed to perform bilateral cortisone injections under ultrasound guidance in her knees here today.  The patient tolerated both procedures well without any complications and activity modifications were reviewed.  She was also given a medial  brace for her left knee to try over the next several weeks and also agreed to begin taking prescription dose Tylenol at 1000 mg 3 times daily.  She will then follow-up with me in about a month to determine her response to this plan and is interested in eventually trying viscosupplementation injections when indicated.    Update on 7/10/2024.  The patient is coming back in for a follow-up visit for her acute on chronic bilateral knee pain secondary to medial compartment DJD.  We performed  bilateral knee cortisone injections about a month ago and she is here today for follow-up visit.  Overall she reports that the left knee is feeling a little bit better.  She thinks that it is about 50% better overall and she is very happy with the medial  brace.  Her right knee is only about 30% better and she is still having it moderate to severe amount of stiffness in right knee joint.  She denies any traumatic events or injuries.  No other complaints here today. We agreed that she has not responded very well to the cortisone injections.  We therefore agreed to pursue the approval of viscosupplementation injections in both knees.  She will continue her pain medication regimen and we are also going to fit her with a right medial  brace since she has responded very well using the one on her left knee.  I will then follow-up with her once her gel injections are approved and arrive.    Update on 9/4/2024.  The patient is coming back in for a follow-up visit to receive her first Euflexxa injection in a series of 3 for her bilateral knee osteoarthritis.  No trauma recently.  No infectious symptoms.  She is still having pain.  No other complaints or today.        Objective   Right Knee Exam     Muscle Strength   The patient has normal right knee strength.    Tenderness   The patient is experiencing tenderness in the medial joint line.    Range of Motion   The patient has normal right knee ROM.    Tests   Wolfgang:  Medial - negative Lateral - negative   Valgus: positive  Lachman:  Anterior - negative        Other   Erythema: absent  Sensation: normal  Pulse: present  Swelling: mild  Effusion: no effusion present    Comments:  Trace effusion      Left Knee Exam     Muscle Strength   The patient has normal left knee strength.    Tenderness   The patient is experiencing tenderness in the medial joint line.    Range of Motion   The patient has normal left knee ROM.    Tests   Wolfgang:  Medial - negative  Lateral - negative   Valgus: positive  Lachman:  Anterior - negative        Other   Erythema: absent  Sensation: normal  Pulse: present  Swelling: mild  Effusion: effusion present    Comments:  Mild effusion        Exam grossly unchanged today    Image Results:  No new imaging today    Patient ID: Danielle Deshpande is a 69 y.o. female.    L Inj/Asp: bilateral knee on 9/4/2024 10:37 AM  Indications: pain  Details: 22 G needle, ultrasound-guided superolateral approach  Medications (Right): 20 mg sodium hyaluronate 10 mg/mL(mw 2.4 -3.6 million)  Medications (Left): 20 mg sodium hyaluronate 10 mg/mL(mw 2.4 -3.6 million)  Outcome: tolerated well, no immediate complications  Procedure, treatment alternatives, risks and benefits explained, specific risks discussed. Consent was given by the patient. Immediately prior to procedure a time out was called to verify the correct patient, procedure, equipment, support staff and site/side marked as required. Patient was prepped and draped in the usual sterile fashion.           Assessment/Plan   Encounter Diagnoses:  Primary osteoarthritis of both knees    Orders Placed This Encounter    L Inj/Asp    Point of Care Ultrasound     No follow-ups on file.    We performed the Euflexxa injections in both knees under ultrasound guidance.  These were injections 1 and a series of 3.  The patient tolerated the procedures well without any complications and activity modifications were reviewed.  She will follow-up with me next week for her second injections.    ** Please excuse any errors in grammar or translation related to this dictation. Voice recognition software was utilized to prepare this document. **       Ang Alfaro MD  [facility]

## 2024-09-05 ENCOUNTER — LAB (OUTPATIENT)
Dept: LAB | Facility: LAB | Age: 69
End: 2024-09-05
Payer: COMMERCIAL

## 2024-09-05 ENCOUNTER — TELEPHONE (OUTPATIENT)
Dept: NEUROSURGERY | Facility: HOSPITAL | Age: 69
End: 2024-09-05
Payer: COMMERCIAL

## 2024-09-05 DIAGNOSIS — M06.9 RHEUMATOID ARTHRITIS INVOLVING MULTIPLE SITES, UNSPECIFIED WHETHER RHEUMATOID FACTOR PRESENT (MULTI): ICD-10-CM

## 2024-09-05 DIAGNOSIS — M06.09 RHEUMATOID ARTHRITIS OF MULTIPLE SITES WITH NEGATIVE RHEUMATOID FACTOR (MULTI): ICD-10-CM

## 2024-09-05 DIAGNOSIS — E55.9 VITAMIN D DEFICIENCY: ICD-10-CM

## 2024-09-05 DIAGNOSIS — M41.9 KYPHOSCOLIOSIS: ICD-10-CM

## 2024-09-05 DIAGNOSIS — M15.9 PRIMARY OSTEOARTHRITIS INVOLVING MULTIPLE JOINTS: ICD-10-CM

## 2024-09-05 DIAGNOSIS — M85.852 OSTEOPENIA OF NECK OF LEFT FEMUR: ICD-10-CM

## 2024-09-05 DIAGNOSIS — G57.93 NEUROPATHY INVOLVING BOTH LOWER EXTREMITIES: ICD-10-CM

## 2024-09-05 DIAGNOSIS — M11.20 CHONDROCALCINOSIS: ICD-10-CM

## 2024-09-05 DIAGNOSIS — Z79.60 LONG-TERM USE OF IMMUNOSUPPRESSANT MEDICATION: ICD-10-CM

## 2024-09-05 LAB
ALBUMIN SERPL BCP-MCNC: 4.1 G/DL (ref 3.4–5)
ALP SERPL-CCNC: 50 U/L (ref 33–136)
ALT SERPL W P-5'-P-CCNC: 14 U/L (ref 7–45)
ANION GAP SERPL CALC-SCNC: 12 MMOL/L (ref 10–20)
AST SERPL W P-5'-P-CCNC: 22 U/L (ref 9–39)
BASOPHILS # BLD AUTO: 0.09 X10*3/UL (ref 0–0.1)
BASOPHILS NFR BLD AUTO: 2.3 %
BILIRUB SERPL-MCNC: 0.6 MG/DL (ref 0–1.2)
BUN SERPL-MCNC: 20 MG/DL (ref 6–23)
CALCIUM SERPL-MCNC: 9.7 MG/DL (ref 8.6–10.3)
CHLORIDE SERPL-SCNC: 106 MMOL/L (ref 98–107)
CO2 SERPL-SCNC: 29 MMOL/L (ref 21–32)
CREAT SERPL-MCNC: 0.73 MG/DL (ref 0.5–1.05)
CRP SERPL-MCNC: 0.11 MG/DL
EGFRCR SERPLBLD CKD-EPI 2021: 89 ML/MIN/1.73M*2
EOSINOPHIL # BLD AUTO: 0.2 X10*3/UL (ref 0–0.7)
EOSINOPHIL NFR BLD AUTO: 5.1 %
ERYTHROCYTE [DISTWIDTH] IN BLOOD BY AUTOMATED COUNT: 12.7 % (ref 11.5–14.5)
ERYTHROCYTE [SEDIMENTATION RATE] IN BLOOD BY WESTERGREN METHOD: 7 MM/H (ref 0–30)
GLUCOSE SERPL-MCNC: 74 MG/DL (ref 74–99)
HCT VFR BLD AUTO: 40.2 % (ref 36–46)
HGB BLD-MCNC: 12.9 G/DL (ref 12–16)
IMM GRANULOCYTES # BLD AUTO: 0.01 X10*3/UL (ref 0–0.7)
IMM GRANULOCYTES NFR BLD AUTO: 0.3 % (ref 0–0.9)
LYMPHOCYTES # BLD AUTO: 0.93 X10*3/UL (ref 1.2–4.8)
LYMPHOCYTES NFR BLD AUTO: 23.5 %
MCH RBC QN AUTO: 29.7 PG (ref 26–34)
MCHC RBC AUTO-ENTMCNC: 32.1 G/DL (ref 32–36)
MCV RBC AUTO: 93 FL (ref 80–100)
MONOCYTES # BLD AUTO: 0.6 X10*3/UL (ref 0.1–1)
MONOCYTES NFR BLD AUTO: 15.2 %
NEUTROPHILS # BLD AUTO: 2.12 X10*3/UL (ref 1.2–7.7)
NEUTROPHILS NFR BLD AUTO: 53.6 %
NRBC BLD-RTO: 0 /100 WBCS (ref 0–0)
PLATELET # BLD AUTO: 223 X10*3/UL (ref 150–450)
POTASSIUM SERPL-SCNC: 3.7 MMOL/L (ref 3.5–5.3)
PROT SERPL-MCNC: 6.5 G/DL (ref 6.4–8.2)
RBC # BLD AUTO: 4.34 X10*6/UL (ref 4–5.2)
SODIUM SERPL-SCNC: 143 MMOL/L (ref 136–145)
WBC # BLD AUTO: 4 X10*3/UL (ref 4.4–11.3)

## 2024-09-05 PROCEDURE — 86140 C-REACTIVE PROTEIN: CPT

## 2024-09-05 PROCEDURE — 85652 RBC SED RATE AUTOMATED: CPT

## 2024-09-05 PROCEDURE — 85025 COMPLETE CBC W/AUTO DIFF WBC: CPT

## 2024-09-05 PROCEDURE — 36415 COLL VENOUS BLD VENIPUNCTURE: CPT

## 2024-09-05 PROCEDURE — 80053 COMPREHEN METABOLIC PANEL: CPT

## 2024-09-05 NOTE — TELEPHONE ENCOUNTER
Result Communication    Resulted Orders   MR lumbar spine wo IV contrast    Narrative    Interpreted By:  Carlos Roca,   STUDY:  MR LUMBAR SPINE WO IV CONTRAST;  9/4/2024 4:23 pm      INDICATION:  Signs/Symptoms:right sided leg numbness medial thorough thigh and  calf. ,M54.17 Radiculopathy, lumbosacral region,M41.9 Scoliosis,  unspecified      COMPARISON:  None.      ACCESSION NUMBER(S):  [id number]      ORDERING CLINICIAN:  HORTENCIA MCPHERSON      TECHNIQUE:  The lumbar spine was studied in the sagital, axial and coronal planes  utiliing T1 and T2 weighted images.      FINDINGS:  There is marked dextroscoliosis with its apex at L2/L3.      Single Mendez charles fixation extending from L4 lamina to the  midthoracic spine. Vertebral body height is normal. There are type 2  rotatory subluxation of T12 to the right relative to L1. Discogenic  marrow signal changes at L4/L5.. The conus and sacrum are normal.  Images at each interspace reveal the following: T12/L1  Rotatory subluxation of T12 to the right relative to L1. Mild facet  hypertrophy without canal or foraminal narrowing L1/L2  Rotatory subluxation of L1 to the right relative to L2. Mild facet  hypertrophy without canal or foraminal narrowing L2/L3  Mild facet hypertrophy without canal or foraminal narrowing  L3/L4  Rotatory subluxation of L3 to the left relative to L4. Mild facet  hypertrophy without canal or foraminal narrowing L4/L5  Rotatory subluxation of L4 to the left relative to L5. Type 2 marrow  signal changes bilateral facet hypertrophy with bulging  intervertebral disc. No measurable central canal stenosis. Marked  bilateral neural foraminal narrowing L5/S1  Bilateral facet hypertrophy and circumferential bulging  intervertebral disc without canal or foraminal narrowing.            Impression    * Previous Mendez charles placement as described with residual  rotatory subluxation and dextroscoliosis with its apex at L2/L3 *No  evidence of focal disc  herniation or measurable canal stenosis  *Foraminal narrowing as described.      MACRO:  none      Signed by: Carlos Roca 9/5/2024 9:02 AM  Dictation workstation:   [id number]       1:20 PM      Results were successfully communicated with the patient and they acknowledged their understanding.    L5-S1 foraminal stenosis correlates with her symptoms. Would continue to recommend core strengthening through PT prior to office visit with Dr. Adamson for surgical discussion.

## 2024-09-09 ENCOUNTER — APPOINTMENT (OUTPATIENT)
Dept: RHEUMATOLOGY | Facility: CLINIC | Age: 69
End: 2024-09-09
Payer: COMMERCIAL

## 2024-09-09 ENCOUNTER — APPOINTMENT (OUTPATIENT)
Dept: PRIMARY CARE | Facility: CLINIC | Age: 69
End: 2024-09-09
Payer: COMMERCIAL

## 2024-09-09 VITALS
BODY MASS INDEX: 22.98 KG/M2 | DIASTOLIC BLOOD PRESSURE: 77 MMHG | WEIGHT: 114 LBS | HEART RATE: 67 BPM | HEIGHT: 59 IN | OXYGEN SATURATION: 96 % | SYSTOLIC BLOOD PRESSURE: 136 MMHG

## 2024-09-09 VITALS
RESPIRATION RATE: 16 BRPM | SYSTOLIC BLOOD PRESSURE: 134 MMHG | HEART RATE: 88 BPM | WEIGHT: 114 LBS | OXYGEN SATURATION: 98 % | BODY MASS INDEX: 22.98 KG/M2 | TEMPERATURE: 98 F | HEIGHT: 59 IN | DIASTOLIC BLOOD PRESSURE: 84 MMHG

## 2024-09-09 DIAGNOSIS — M06.9 RHEUMATOID ARTHRITIS INVOLVING MULTIPLE SITES, UNSPECIFIED WHETHER RHEUMATOID FACTOR PRESENT (MULTI): ICD-10-CM

## 2024-09-09 DIAGNOSIS — N81.4 CYSTOCELE WITH UTERINE PROLAPSE: ICD-10-CM

## 2024-09-09 DIAGNOSIS — I10 BENIGN ESSENTIAL HYPERTENSION: ICD-10-CM

## 2024-09-09 DIAGNOSIS — M41.9 KYPHOSCOLIOSIS: ICD-10-CM

## 2024-09-09 DIAGNOSIS — M15.9 PRIMARY OSTEOARTHRITIS INVOLVING MULTIPLE JOINTS: ICD-10-CM

## 2024-09-09 DIAGNOSIS — Z23 IMMUNIZATION DUE: ICD-10-CM

## 2024-09-09 DIAGNOSIS — Z79.60 LONG-TERM USE OF IMMUNOSUPPRESSANT MEDICATION: ICD-10-CM

## 2024-09-09 DIAGNOSIS — Z79.899 LONG-TERM USE OF PLAQUENIL: ICD-10-CM

## 2024-09-09 DIAGNOSIS — Z79.899 ON LONG TERM LEFLUNOMIDE THERAPY: ICD-10-CM

## 2024-09-09 DIAGNOSIS — M11.20 CHONDROCALCINOSIS: ICD-10-CM

## 2024-09-09 DIAGNOSIS — G43.909 MIGRAINE WITHOUT STATUS MIGRAINOSUS, NOT INTRACTABLE, UNSPECIFIED MIGRAINE TYPE: ICD-10-CM

## 2024-09-09 DIAGNOSIS — M05.79 RHEUMATOID ARTHRITIS INVOLVING MULTIPLE SITES WITH POSITIVE RHEUMATOID FACTOR (MULTI): ICD-10-CM

## 2024-09-09 DIAGNOSIS — E55.9 VITAMIN D DEFICIENCY: ICD-10-CM

## 2024-09-09 DIAGNOSIS — Z12.31 BREAST CANCER SCREENING BY MAMMOGRAM: Primary | ICD-10-CM

## 2024-09-09 DIAGNOSIS — M81.0 OSTEOPOROSIS, POST-MENOPAUSAL: ICD-10-CM

## 2024-09-09 DIAGNOSIS — I45.6 WOLFF-PARKINSON-WHITE (WPW) SYNDROME: ICD-10-CM

## 2024-09-09 DIAGNOSIS — M85.852 OSTEOPENIA OF NECK OF LEFT FEMUR: ICD-10-CM

## 2024-09-09 DIAGNOSIS — N20.0 NEPHROLITHIASIS: ICD-10-CM

## 2024-09-09 DIAGNOSIS — G57.93 NEUROPATHY INVOLVING BOTH LOWER EXTREMITIES: ICD-10-CM

## 2024-09-09 DIAGNOSIS — M54.2 NECK PAIN: ICD-10-CM

## 2024-09-09 DIAGNOSIS — M06.09 RHEUMATOID ARTHRITIS OF MULTIPLE SITES WITH NEGATIVE RHEUMATOID FACTOR (MULTI): Primary | ICD-10-CM

## 2024-09-09 DIAGNOSIS — Z00.00 ROUTINE GENERAL MEDICAL EXAMINATION AT HEALTH CARE FACILITY: ICD-10-CM

## 2024-09-09 DIAGNOSIS — Z00.00 HEALTHCARE MAINTENANCE: ICD-10-CM

## 2024-09-09 DIAGNOSIS — M54.17 LUMBOSACRAL RADICULOPATHY AT L5: ICD-10-CM

## 2024-09-09 PROBLEM — M54.9 BACK PAIN: Status: RESOLVED | Noted: 2023-10-10 | Resolved: 2024-09-09

## 2024-09-09 PROBLEM — H81.10 BPPV (BENIGN PAROXYSMAL POSITIONAL VERTIGO): Status: RESOLVED | Noted: 2021-01-04 | Resolved: 2024-09-09

## 2024-09-09 PROBLEM — I24.9 ACUTE CORONARY SYNDROME (MULTI): Status: RESOLVED | Noted: 2024-04-12 | Resolved: 2024-09-09

## 2024-09-09 PROBLEM — I48.0 PAF (PAROXYSMAL ATRIAL FIBRILLATION) (MULTI): Status: RESOLVED | Noted: 2020-01-13 | Resolved: 2024-09-09

## 2024-09-09 PROBLEM — R82.994 HYPERCALCIURIA: Status: RESOLVED | Noted: 2023-09-07 | Resolved: 2024-09-09

## 2024-09-09 PROBLEM — R06.02 SHORTNESS OF BREATH: Status: RESOLVED | Noted: 2024-04-12 | Resolved: 2024-09-09

## 2024-09-09 PROBLEM — I47.10 PAROXYSMAL SUPRAVENTRICULAR TACHYCARDIA (CMS-HCC): Status: RESOLVED | Noted: 2024-05-08 | Resolved: 2024-09-09

## 2024-09-09 PROBLEM — N81.10 FEMALE BLADDER PROLAPSE: Status: RESOLVED | Noted: 2023-02-11 | Resolved: 2024-09-09

## 2024-09-09 PROCEDURE — 1036F TOBACCO NON-USER: CPT | Performed by: INTERNAL MEDICINE

## 2024-09-09 PROCEDURE — 1170F FXNL STATUS ASSESSED: CPT | Performed by: INTERNAL MEDICINE

## 2024-09-09 PROCEDURE — 3008F BODY MASS INDEX DOCD: CPT | Performed by: INTERNAL MEDICINE

## 2024-09-09 PROCEDURE — 1126F AMNT PAIN NOTED NONE PRSNT: CPT | Performed by: INTERNAL MEDICINE

## 2024-09-09 PROCEDURE — 3008F BODY MASS INDEX DOCD: CPT | Performed by: STUDENT IN AN ORGANIZED HEALTH CARE EDUCATION/TRAINING PROGRAM

## 2024-09-09 PROCEDURE — 3075F SYST BP GE 130 - 139MM HG: CPT | Performed by: STUDENT IN AN ORGANIZED HEALTH CARE EDUCATION/TRAINING PROGRAM

## 2024-09-09 PROCEDURE — 99214 OFFICE O/P EST MOD 30 MIN: CPT | Performed by: INTERNAL MEDICINE

## 2024-09-09 PROCEDURE — 99397 PER PM REEVAL EST PAT 65+ YR: CPT | Performed by: INTERNAL MEDICINE

## 2024-09-09 PROCEDURE — 90471 IMMUNIZATION ADMIN: CPT | Performed by: INTERNAL MEDICINE

## 2024-09-09 PROCEDURE — 1125F AMNT PAIN NOTED PAIN PRSNT: CPT | Performed by: STUDENT IN AN ORGANIZED HEALTH CARE EDUCATION/TRAINING PROGRAM

## 2024-09-09 PROCEDURE — 3079F DIAST BP 80-89 MM HG: CPT | Performed by: INTERNAL MEDICINE

## 2024-09-09 PROCEDURE — 1036F TOBACCO NON-USER: CPT | Performed by: STUDENT IN AN ORGANIZED HEALTH CARE EDUCATION/TRAINING PROGRAM

## 2024-09-09 PROCEDURE — 99214 OFFICE O/P EST MOD 30 MIN: CPT | Performed by: STUDENT IN AN ORGANIZED HEALTH CARE EDUCATION/TRAINING PROGRAM

## 2024-09-09 PROCEDURE — 1160F RVW MEDS BY RX/DR IN RCRD: CPT | Performed by: INTERNAL MEDICINE

## 2024-09-09 PROCEDURE — 90662 IIV NO PRSV INCREASED AG IM: CPT | Performed by: INTERNAL MEDICINE

## 2024-09-09 PROCEDURE — 1159F MED LIST DOCD IN RCRD: CPT | Performed by: STUDENT IN AN ORGANIZED HEALTH CARE EDUCATION/TRAINING PROGRAM

## 2024-09-09 PROCEDURE — 3078F DIAST BP <80 MM HG: CPT | Performed by: STUDENT IN AN ORGANIZED HEALTH CARE EDUCATION/TRAINING PROGRAM

## 2024-09-09 PROCEDURE — 3075F SYST BP GE 130 - 139MM HG: CPT | Performed by: INTERNAL MEDICINE

## 2024-09-09 PROCEDURE — 1124F ACP DISCUSS-NO DSCNMKR DOCD: CPT | Performed by: INTERNAL MEDICINE

## 2024-09-09 PROCEDURE — 1160F RVW MEDS BY RX/DR IN RCRD: CPT | Performed by: STUDENT IN AN ORGANIZED HEALTH CARE EDUCATION/TRAINING PROGRAM

## 2024-09-09 PROCEDURE — 1159F MED LIST DOCD IN RCRD: CPT | Performed by: INTERNAL MEDICINE

## 2024-09-09 RX ORDER — HYDROXYCHLOROQUINE SULFATE 200 MG/1
200 TABLET, FILM COATED ORAL DAILY
Qty: 90 TABLET | Refills: 1 | Status: SHIPPED | OUTPATIENT
Start: 2024-09-09 | End: 2025-03-08

## 2024-09-09 RX ORDER — METOPROLOL SUCCINATE 50 MG/1
50 TABLET, EXTENDED RELEASE ORAL DAILY
Qty: 90 TABLET | Refills: 3 | Status: SHIPPED | OUTPATIENT
Start: 2024-09-09 | End: 2025-09-09

## 2024-09-09 RX ORDER — ALENDRONATE SODIUM 70 MG/1
70 TABLET ORAL
Qty: 12 TABLET | Refills: 3 | Status: SHIPPED | OUTPATIENT
Start: 2024-09-09 | End: 2025-09-09

## 2024-09-09 RX ORDER — AMITRIPTYLINE HYDROCHLORIDE 10 MG/1
10 TABLET, FILM COATED ORAL NIGHTLY
Qty: 90 TABLET | Refills: 3 | Status: SHIPPED | OUTPATIENT
Start: 2024-09-09 | End: 2025-09-09

## 2024-09-09 RX ORDER — LISINOPRIL 10 MG/1
10 TABLET ORAL DAILY
Qty: 90 TABLET | Refills: 3 | Status: SHIPPED | OUTPATIENT
Start: 2024-09-09 | End: 2025-09-09

## 2024-09-09 RX ORDER — LEFLUNOMIDE 20 MG/1
20 TABLET ORAL DAILY
Qty: 180 TABLET | Refills: 1 | Status: SHIPPED | OUTPATIENT
Start: 2024-09-09 | End: 2025-09-04

## 2024-09-09 ASSESSMENT — ENCOUNTER SYMPTOMS
LOSS OF SENSATION IN FEET: 1
LOSS OF SENSATION IN FEET: 0
CHILLS: 0
TROUBLE SWALLOWING: 0
PALPITATIONS: 0
ABDOMINAL PAIN: 0
OCCASIONAL FEELINGS OF UNSTEADINESS: 0
DEPRESSION: 0
SHORTNESS OF BREATH: 0
SORE THROAT: 0
FEVER: 0
OCCASIONAL FEELINGS OF UNSTEADINESS: 1
DEPRESSION: 0

## 2024-09-09 ASSESSMENT — ACTIVITIES OF DAILY LIVING (ADL)
GROCERY_SHOPPING: INDEPENDENT
BATHING: INDEPENDENT
TAKING_MEDICATION: INDEPENDENT
MANAGING_FINANCES: INDEPENDENT
DOING_HOUSEWORK: INDEPENDENT
DRESSING: INDEPENDENT

## 2024-09-09 ASSESSMENT — PATIENT HEALTH QUESTIONNAIRE - PHQ9
2. FEELING DOWN, DEPRESSED OR HOPELESS: NOT AT ALL
SUM OF ALL RESPONSES TO PHQ9 QUESTIONS 1 AND 2: 0
1. LITTLE INTEREST OR PLEASURE IN DOING THINGS: NOT AT ALL
SUM OF ALL RESPONSES TO PHQ9 QUESTIONS 1 AND 2: 0
1. LITTLE INTEREST OR PLEASURE IN DOING THINGS: NOT AT ALL
2. FEELING DOWN, DEPRESSED OR HOPELESS: NOT AT ALL

## 2024-09-09 ASSESSMENT — PAIN SCALES - GENERAL
PAINLEVEL: 3
PAINLEVEL: 0-NO PAIN

## 2024-09-09 NOTE — ASSESSMENT & PLAN NOTE
Orders:    metoprolol succinate XL (Toprol-XL) 50 mg 24 hr tablet; Take 1 tablet (50 mg) by mouth once daily. Do not crush or chew.- has been taking 50 mg daily

## 2024-09-09 NOTE — ASSESSMENT & PLAN NOTE
Orders:    metoprolol succinate XL (Toprol-XL) 50 mg 24 hr tablet; Take 1 tablet (50 mg) by mouth once daily. Do not crush or chew.- has been taking 50 mg daily    lisinopril 10 mg tablet; Take 1 tablet (10 mg) by mouth once daily.

## 2024-09-09 NOTE — PROGRESS NOTES
"Subjective   Reason for Visit: Danielle Deshpande is an 69 y.o. female here for a Medicare Wellness visit.     Past Medical, Surgical, and Family History reviewed and updated in chart.    Reviewed all medications by prescribing practitioner or clinical pharmacist (such as prescriptions, OTCs, herbal therapies and supplements) and documented in the medical record.    Patient has living will and POA but not on file   Patient would like her flu vaccine   Mammogram: 3.24.2023  Colonoscopy: 11.13.2023  Dexa: 10.31.2023  Patient is due for her second shingles vaccine but would like to wait for it     Has Gel shots for both knees with Dr. Alfaro and is using knee braces.          Patient Care Team:  West Pizano DO as PCP - General (Internal Medicine)  West Pizano DO as PCP - MMO [facility] PCP  Christofer Dalton MD as Cardiologist (Electrophysiology)  Laila Alonso MD as Consulting Physician (Rheumatology)  Ang Alfaro MD as Consulting Physician (Sports Medicine)  Edward Adamson MD as Surgeon (Neurosurgery)  Dano Alejandro MD as Surgeon (Urology)  Regina Kim MD as Obstetrician (Obstetrics and Gynecology)     Review of Systems   Constitutional:  Negative for chills and fever.   HENT:  Negative for sore throat and trouble swallowing.    Respiratory:  Negative for shortness of breath.    Cardiovascular:  Negative for chest pain, palpitations and leg swelling.   Gastrointestinal:  Negative for abdominal pain.   Skin:  Negative for rash.       Objective   Vitals:  /84   Pulse 88   Temp 36.7 °C (98 °F)   Resp 16   Ht 1.486 m (4' 10.5\")   Wt 51.7 kg (114 lb)   SpO2 98%   BMI 23.42 kg/m²       Physical Exam  Constitutional:       Appearance: Normal appearance.   HENT:      Head: Normocephalic.   Eyes:      Conjunctiva/sclera: Conjunctivae normal.   Cardiovascular:      Rate and Rhythm: Normal rate and regular rhythm.      Heart sounds: Normal heart sounds.   Pulmonary:      " Effort: Pulmonary effort is normal.      Breath sounds: Normal breath sounds.   Musculoskeletal:      Cervical back: Neck supple.   Skin:     General: Skin is warm and dry.   Neurological:      Mental Status: She is alert.         Assessment & Plan  Routine general medical examination at health care facility  Reviewed problem list and medications, refills ordered.       Breast cancer screening by mammogram    Orders:    BI mammo bilateral screening tomosynthesis; Future    Migraine without status migrainosus, not intractable, unspecified migraine type    Orders:    metoprolol succinate XL (Toprol-XL) 50 mg 24 hr tablet; Take 1 tablet (50 mg) by mouth once daily. Do not crush or chew.- has been taking 50 mg daily    Benign essential hypertension    Orders:    metoprolol succinate XL (Toprol-XL) 50 mg 24 hr tablet; Take 1 tablet (50 mg) by mouth once daily. Do not crush or chew.- has been taking 50 mg daily    lisinopril 10 mg tablet; Take 1 tablet (10 mg) by mouth once daily.    Osteopenia of neck of left femur    Orders:    alendronate (Fosamax) 70 mg tablet; Take 1 tablet (70 mg) by mouth every 7 days. On Sundays. Take on an empty stomach. Do not lie down or eat for 1/2 hour after taking.    Long-term use of immunosuppressant medication    Orders:    alendronate (Fosamax) 70 mg tablet; Take 1 tablet (70 mg) by mouth every 7 days. On Sundays. Take on an empty stomach. Do not lie down or eat for 1/2 hour after taking.    Vitamin D deficiency    Orders:    alendronate (Fosamax) 70 mg tablet; Take 1 tablet (70 mg) by mouth every 7 days. On Sundays. Take on an empty stomach. Do not lie down or eat for 1/2 hour after taking.    Kyphoscoliosis    Orders:    alendronate (Fosamax) 70 mg tablet; Take 1 tablet (70 mg) by mouth every 7 days. On Sundays. Take on an empty stomach. Do not lie down or eat for 1/2 hour after taking.    Rheumatoid arthritis involving multiple sites, unspecified whether rheumatoid factor present  (Multi)    Orders:    alendronate (Fosamax) 70 mg tablet; Take 1 tablet (70 mg) by mouth every 7 days. On Sundays. Take on an empty stomach. Do not lie down or eat for 1/2 hour after taking.    Rheumatoid arthritis involving multiple sites with positive rheumatoid factor (Multi)    Orders:    alendronate (Fosamax) 70 mg tablet; Take 1 tablet (70 mg) by mouth every 7 days. On Sundays. Take on an empty stomach. Do not lie down or eat for 1/2 hour after taking.    Healthcare maintenance    Orders:    amitriptyline (Elavil) 10 mg tablet; Take 1 tablet (10 mg) by mouth once daily at bedtime.    Neck pain    Orders:    Referral to Physical Therapy; Future    Lumbosacral radiculopathy at L5    Orders:    Referral to Physical Therapy; Future    Immunization due    Orders:    Flu vaccine, trivalent, preservative free, HIGH-DOSE, age 65y+ (Fluzone)    Felton-Parkinson-White (WPW) syndrome         Osteoporosis, post-menopausal         Nephrolithiasis         Cystocele with uterine prolapse                West Pizano DO

## 2024-09-09 NOTE — ASSESSMENT & PLAN NOTE
Orders:    alendronate (Fosamax) 70 mg tablet; Take 1 tablet (70 mg) by mouth every 7 days. On Sundays. Take on an empty stomach. Do not lie down or eat for 1/2 hour after taking.

## 2024-09-11 ENCOUNTER — APPOINTMENT (OUTPATIENT)
Dept: ORTHOPEDIC SURGERY | Facility: CLINIC | Age: 69
End: 2024-09-11
Payer: COMMERCIAL

## 2024-09-11 ENCOUNTER — HOSPITAL ENCOUNTER (OUTPATIENT)
Dept: RADIOLOGY | Facility: EXTERNAL LOCATION | Age: 69
Discharge: HOME | End: 2024-09-11

## 2024-09-11 DIAGNOSIS — M17.0 PRIMARY OSTEOARTHRITIS OF BOTH KNEES: Primary | ICD-10-CM

## 2024-09-11 PROCEDURE — 1159F MED LIST DOCD IN RCRD: CPT | Performed by: EMERGENCY MEDICINE

## 2024-09-11 PROCEDURE — 1123F ACP DISCUSS/DSCN MKR DOCD: CPT | Performed by: EMERGENCY MEDICINE

## 2024-09-11 PROCEDURE — 1125F AMNT PAIN NOTED PAIN PRSNT: CPT | Performed by: EMERGENCY MEDICINE

## 2024-09-11 PROCEDURE — 20611 DRAIN/INJ JOINT/BURSA W/US: CPT | Performed by: EMERGENCY MEDICINE

## 2024-09-11 ASSESSMENT — PAIN SCALES - GENERAL: PAINLEVEL_OUTOF10: 2

## 2024-09-11 ASSESSMENT — PAIN DESCRIPTION - DESCRIPTORS: DESCRIPTORS: TENDER

## 2024-09-11 ASSESSMENT — PAIN - FUNCTIONAL ASSESSMENT: PAIN_FUNCTIONAL_ASSESSMENT: 0-10

## 2024-09-11 NOTE — PROGRESS NOTES
Euflexxa B/L knee injections    Subjective    Patient ID: Danielle Deshpande is a 69 y.o. female.    Chief Complaint: Pain of the Left Knee and Pain of the Right Knee     Last Surgery: No surgery found  Last Surgery Date: No surgery found    Danielle is a very pleasant 69-year-old female who is here today with her  of 47 years.  She has a history of rheumatoid arthritis and establish care with a new provider back in January.  She takes Plaquenil and is presenting here today after being referred by her PCP, Dr. Pizano.  Patient has been having acute on chronic bilateral knee pain most severe in the left knee and located in both medial compartments.  She has a history of scoliosis and is bowlegged.  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  She had a steroid injection once or twice in the past.  Both worked very well and the last time she had an injection was about 6 years ago.  She takes multiple medications for her rheumatoid arthritis.  She is not currently taking any NSAIDs or Tylenol.  She is interested in a brace if it would help and would like to avoid surgery if possible. We agreed to perform bilateral cortisone injections under ultrasound guidance in her knees here today.  The patient tolerated both procedures well without any complications and activity modifications were reviewed.  She was also given a medial  brace for her left knee to try over the next several weeks and also agreed to begin taking prescription dose Tylenol at 1000 mg 3 times daily.  She will then follow-up with me in about a month to determine her response to this plan and is interested in eventually trying viscosupplementation injections when indicated.    Update on 7/10/2024.  The patient is coming back in for a follow-up visit for her acute on chronic bilateral knee pain secondary to medial compartment DJD.  We performed bilateral knee cortisone  injections about a month ago and she is here today for follow-up visit.  Overall she reports that the left knee is feeling a little bit better.  She thinks that it is about 50% better overall and she is very happy with the medial  brace.  Her right knee is only about 30% better and she is still having it moderate to severe amount of stiffness in right knee joint.  She denies any traumatic events or injuries.  No other complaints here today. We agreed that she has not responded very well to the cortisone injections.  We therefore agreed to pursue the approval of viscosupplementation injections in both knees.  She will continue her pain medication regimen and we are also going to fit her with a right medial  brace since she has responded very well using the one on her left knee.  I will then follow-up with her once her gel injections are approved and arrive.    Update on 9/4/2024.  The patient is coming back in for a follow-up visit to receive her first Euflexxa injection in a series of 3 for her bilateral knee osteoarthritis.  No trauma recently.  No infectious symptoms.  She is still having pain.  No other complaints or today. We performed the Euflexxa injections in both knees under ultrasound guidance.  These were injections 1 and a series of 3.  The patient tolerated the procedures well without any complications and activity modifications were reviewed.  She will follow-up with me next week for her second injections.    9/11/2024.  The patient is coming back in for her second Euflexxa injections saying that she is already starting to experience some improved range of motion and decreased pain.  No complaints here today.        Objective   Right Knee Exam     Muscle Strength   The patient has normal right knee strength.    Tenderness   The patient is experiencing tenderness in the medial joint line.    Range of Motion   The patient has normal right knee ROM.    Tests   Wolfgang:  Medial - negative  Lateral - negative   Valgus: positive  Lachman:  Anterior - negative        Other   Erythema: absent  Sensation: normal  Pulse: present  Swelling: mild    Comments:  Trace effusion      Left Knee Exam     Muscle Strength   The patient has normal left knee strength.    Tenderness   The patient is experiencing tenderness in the medial joint line.    Range of Motion   The patient has normal left knee ROM.    Tests   Wolfgang:  Medial - negative Lateral - negative   Valgus: positive  Lachman:  Anterior - negative        Other   Erythema: absent  Sensation: normal  Pulse: present  Swelling: mild    Comments:  Trace effusion        Exam grossly unchanged today    Image Results:  No new imaging today    Patient ID: Danielle Deshpande is a 69 y.o. female.    L Inj/Asp: bilateral knee on 9/11/2024 9:57 AM  Indications: pain  Details: 22 G needle, ultrasound-guided superolateral approach  Medications (Right): 20 mg sodium hyaluronate 10 mg/mL(mw 2.4 -3.6 million)  Medications (Left): 20 mg sodium hyaluronate 10 mg/mL(mw 2.4 -3.6 million)  Outcome: tolerated well, no immediate complications  Procedure, treatment alternatives, risks and benefits explained, specific risks discussed. Consent was given by the patient. Immediately prior to procedure a time out was called to verify the correct patient, procedure, equipment, support staff and site/side marked as required. Patient was prepped and draped in the usual sterile fashion.           Assessment/Plan   Encounter Diagnoses:  Primary osteoarthritis of both knees    Orders Placed This Encounter    L Inj/Asp    Point of Care Ultrasound     No follow-ups on file.    We performed Euflexxa injections in both knees under ultrasound guidance.  These were injections 2 in a series of 3.  The patient tolerated the procedures well without any complications and activity modifications were reviewed.  She will follow-up with me next week for her 3rd injections.    ** Please excuse any errors in  grammar or translation related to this dictation. Voice recognition software was utilized to prepare this document. **       Ang Alfaro MD  [facility]

## 2024-09-14 ENCOUNTER — HOSPITAL ENCOUNTER (EMERGENCY)
Facility: HOSPITAL | Age: 69
Discharge: HOME | End: 2024-09-14
Attending: STUDENT IN AN ORGANIZED HEALTH CARE EDUCATION/TRAINING PROGRAM
Payer: COMMERCIAL

## 2024-09-14 VITALS
DIASTOLIC BLOOD PRESSURE: 70 MMHG | SYSTOLIC BLOOD PRESSURE: 125 MMHG | WEIGHT: 114 LBS | OXYGEN SATURATION: 97 % | HEART RATE: 76 BPM | RESPIRATION RATE: 18 BRPM | TEMPERATURE: 97.5 F | HEIGHT: 58 IN | BODY MASS INDEX: 23.93 KG/M2

## 2024-09-14 DIAGNOSIS — R13.10 DYSPHAGIA, UNSPECIFIED TYPE: Primary | ICD-10-CM

## 2024-09-14 PROCEDURE — 99281 EMR DPT VST MAYX REQ PHY/QHP: CPT

## 2024-09-14 PROCEDURE — 99283 EMERGENCY DEPT VISIT LOW MDM: CPT | Performed by: STUDENT IN AN ORGANIZED HEALTH CARE EDUCATION/TRAINING PROGRAM

## 2024-09-14 ASSESSMENT — PAIN SCALES - GENERAL
PAINLEVEL_OUTOF10: 0 - NO PAIN

## 2024-09-14 ASSESSMENT — PAIN - FUNCTIONAL ASSESSMENT
PAIN_FUNCTIONAL_ASSESSMENT: 0-10
PAIN_FUNCTIONAL_ASSESSMENT: 0-10

## 2024-09-14 NOTE — ED PROVIDER NOTES
EMERGENCY DEPARTMENT ENCOUNTER      Pt Name: Danielle Deshpande  MRN: [id number]  Birthdate 1955  Date of evaluation: 9/14/2024  Provider: Yoabny Jung MD    CHIEF COMPLAINT       Chief Complaint   Patient presents with    Dysphagia    increased mucous     HISTORY OF PRESENT ILLNESS    Danielle Deshpande is a 69 y.o. year old female who presents to the ER for an episode of dysphagia.  Patient reports that happened during lunch while she was eating munira chips, apple, and water.  The patient reports that usually she can overcome her dysphagia by drinking sips of water but this episode lasted over an hour.  She was concerned so she went to the ED.  Patient also reports having mouthfuls of mucus and gurgling.  The patient denies aspiration, syncope, difficulty breathing, wheezing, coughing.  The patient's past medical history is significant for rheumatoid arthritis, tricuspid regurgitation, WPW, kidney stones, and dysphagia.      PAST MEDICAL HISTORY     Past Medical History:   Diagnosis Date    Acute recurrent sinusitis, unspecified 02/04/2022    Acute recurrent sinusitis, unspecified location    Arthritis 2010    Colon polyp 1986    Heart disease     WPW    Hypertension 1993    Other signs and symptoms in breast 06/11/2014    Inversion of nipple    Personal history of other diseases of the circulatory system 04/02/2014    History of hypertension    Personal history of other diseases of the female genital tract 08/14/2020    History of postmenopausal bleeding    Personal history of other diseases of the female genital tract 01/18/2022    History of vaginitis    Personal history of other diseases of the musculoskeletal system and connective tissue 04/02/2014    Personal history of scoliosis    Personal history of other specified conditions 06/11/2014    History of lump of right breast    Personal history of other specified conditions 09/01/2020    History of fatigue    PONV (postoperative nausea and vomiting) 1980     Unspecified hydronephrosis 2020    Hydronephrosis of right kidney     CURRENT MEDICATIONS       Previous Medications    ALENDRONATE (FOSAMAX) 70 MG TABLET    Take 1 tablet (70 mg) by mouth every 7 days. On Sundays. Take on an empty stomach. Do not lie down or eat for 1/2 hour after taking.    AMITRIPTYLINE (ELAVIL) 10 MG TABLET    Take 1 tablet (10 mg) by mouth once daily at bedtime.    BIOTIN 5,000 MCG TABLET,DISINTEGRATING    Take 1 tablet daily.    CALCIUM CARBONATE (OSCAL) 500 MG CALCIUM (1,250 MG) TABLET    Take 1 tablet (1,250 mg) by mouth once daily.    CHOLECALCIFEROL (VITAMIN D-3) 50 MCG (2,000 UNIT) CAPSULE    Take 1 capsule daily    CRANBERRY 500 MG CAPSULE    Take 1 capsule by mouth once daily.    DOCUSATE SODIUM (COLACE) 100 MG CAPSULE    Take 1 capsule (100 mg) by mouth once daily.    ESTRADIOL (ESTRACE) 0.01 % (0.1 MG/GRAM) VAGINAL CREAM    Insert 0.25 Applicatorfuls (1 g) into the vagina 2 times a week.    GLUCOSAMINE-CHONDROITIN (OSTEO BI-FLEX) 250-200 MG TABLET    Take 1 tablet by mouth 2 times a day.    HYDROXYCHLOROQUINE (PLAQUENIL) 200 MG TABLET    Take 1 tablet (200 mg) by mouth once daily.    LEFLUNOMIDE (ARAVA) 20 MG TABLET    Take 1 tablet (20 mg) by mouth once daily.    LISINOPRIL 10 MG TABLET    Take 1 tablet (10 mg) by mouth once daily.    MAGNESIUM GLYCINATE 100 MG TABLET    Take 1 tablet by mouth once daily.    METOPROLOL SUCCINATE XL (TOPROL-XL) 50 MG 24 HR TABLET    Take 1 tablet (50 mg) by mouth once daily. Do not crush or chew.- has been taking 50 mg daily    MULTIVITAMIN WITH MINERALS TABLET    Take 0.5 tablets by mouth once daily.    TURMERIC ROOT EXTRACT 500 MG CAPSULE    Take 1 capsule by mouth 2 times a day.     SURGICAL HISTORY       Past Surgical History:   Procedure Laterality Date    BACK SURGERY  2014    Back Surgery    BREAST BIOPSY  2014    Biopsy Breast Open     SECTION, LOW TRANSVERSE      COLONOSCOPY  2014    Colonoscopy  (Fiberoptic)    OTHER SURGICAL HISTORY  01/18/2022    Percutaneous nephrolithotomy    OTHER SURGICAL HISTORY  01/18/2022    Renal lithotripsy     ALLERGIES     Diphenhydramine hcl, Levaquin [levofloxacin], and Novocain [procaine]  FAMILY HISTORY       Family History   Problem Relation Name Age of Onset    Other (Non-Hodkins Lymphoma) Mother carmen Osborn     Thyroid disease Mother carmen Osborn     Hypertension Mother carmen Osborn     Emphysema Father      Cancer Father          Urinary Bladder    Stomach cancer Mother's Brother Ortiz Suh      SOCIAL HISTORY       Social History     Tobacco Use    Smoking status: Never    Smokeless tobacco: Never   Vaping Use    Vaping status: Never Used   Substance Use Topics    Alcohol use: Not Currently    Drug use: Never     PHYSICAL EXAM  (up to 7 for level 4, 8 or more for level 5)     ED Triage Vitals [09/14/24 1321]   Temperature Heart Rate Respirations BP   36.4 °C (97.5 °F) (!) 114 20 155/84      Pulse Ox Temp Source Heart Rate Source Patient Position   99 % Tympanic Monitor Sitting      BP Location FiO2 (%)     Right arm --       Physical Exam  Constitutional:       Appearance: Normal appearance.   HENT:      Mouth/Throat:      Mouth: Mucous membranes are moist.   Cardiovascular:      Rate and Rhythm: Normal rate and regular rhythm.   Pulmonary:      Effort: Pulmonary effort is normal.      Breath sounds: Normal breath sounds.   Abdominal:      General: Abdomen is flat. Bowel sounds are normal.      Palpations: Abdomen is soft.   Musculoskeletal:      Cervical back: Normal range of motion and neck supple.      Thoracic back: Scoliosis present.   Skin:     General: Skin is warm and dry.   Neurological:      Mental Status: She is alert.        DIAGNOSTIC RESULTS   LABS:  Labs Reviewed - No data to display  All other labs were within normal range or not returned as of this dictation.  Imaging  No orders to display      Procedure  Procedures  EMERGENCY DEPARTMENT  "COURSE/MDM:   Medical Decision Making  Patient is a 69-year-old female who comes to the ED for an episode of dysphagia.  Patient reports that happened during lunch while she was eating munira chips, apple, and water.  The patient reports that usually she can overcome her dysphagia by drinking sips of water but this episode lasted over an hour.  She was concerned so she went to the ED.  Patient also reports having mouthfuls of mucus and gurgling.  The patient denies aspiration, syncope, difficulty breathing, wheezing, coughing.  The patient's past medical history is significant for rheumatoid arthritis, tricuspid regurgitation, WPW, kidney stones, and dysphagia.     On physical examination the patient was able to swallow without any problems, lungs were clear bilaterally to auscultation.  Dentition and mucosa are within normal limits.  The patient was not in any distress.  Patient was monitored for her tachycardia which resolved and she was given a p.o. challenge which she passed.  The patient was discharged with return precautions and instructions to follow-up with her primary care provider and GI referral.      Vitals:    Vitals:    09/14/24 1321 09/14/24 1446   BP: 155/84 138/74   BP Location: Right arm Right arm   Patient Position: Sitting Sitting   Pulse: (!) 114 87   Resp: 20 18   Temp: 36.4 °C (97.5 °F)    TempSrc: Tympanic    SpO2: 99% 98%   Weight: 51.7 kg (114 lb)    Height: 1.473 m (4' 10\")          ED Course as of 09/14/24 1524   Sat Sep 14, 2024   1455 Tachycardia is resolved.  I do not suspect underlying complication with patient's WPW at this time.  Inspection was mildly anxious on arrival.  Still pending results of p.o. challenge. [TL]      ED Course User Index  [TL] Ronaldo Ramos DO         Diagnoses as of 09/14/24 1524   Dysphagia, unspecified type         External Records Reviewed: I reviewed recent and relevant outside records including inpatient notes, outpatient records      Shared decision " making for disposition  Patient and/or patient´s representative was counseled regarding labs, imaging, likely diagnosis. All questions were answered. Recommendation was made for discharge home with return precautions and instructions to follow up with PCP and GI referral.     ED Medications administered this visit:  Medications - No data to display    New Prescriptions from this visit:    New Prescriptions    No medications on file       Follow-up:  West Pizano DO  [address] [facility] [address]  [facility] [id number] [phone]    In 1 week      Sera VINSON MD  [address] [facility] [address]  [facility] [address]  [phone]              Final Impression:   1. Dysphagia, unspecified type          Please excuse any misspellings or unintended errors related to the Dragon speech recognition software used to dictate this note.    I reviewed the case with the attending ED physician. The attending ED physician agrees with the plan.      Yobany Jung MD  Resident  09/14/24 [id number]

## 2024-09-14 NOTE — ED TRIAGE NOTES
Addended by: MICHELINE REHMAN on: 1/14/2022 11:58 AM     Modules accepted: Orders     Pt states she was at work eating her lunch and she began to have difficulty swallowing along with increased mucous production. Pt states this has never happened before. States history of RA and GERD.

## 2024-09-14 NOTE — DISCHARGE INSTRUCTIONS
Return to the ED if you are unable to eat and drink, you develop a fever, and if you have any other concerns.  Follow-up with your primary care provider in 1 week and follow up with the referral to GI.

## 2024-09-18 ENCOUNTER — APPOINTMENT (OUTPATIENT)
Dept: ORTHOPEDIC SURGERY | Facility: CLINIC | Age: 69
End: 2024-09-18
Payer: COMMERCIAL

## 2024-09-18 ENCOUNTER — TELEPHONE (OUTPATIENT)
Dept: NEUROSURGERY | Facility: CLINIC | Age: 69
End: 2024-09-18

## 2024-09-18 ENCOUNTER — HOSPITAL ENCOUNTER (OUTPATIENT)
Dept: RADIOLOGY | Facility: EXTERNAL LOCATION | Age: 69
Discharge: HOME | End: 2024-09-18

## 2024-09-18 DIAGNOSIS — M17.0 PRIMARY OSTEOARTHRITIS OF BOTH KNEES: Primary | ICD-10-CM

## 2024-09-18 PROCEDURE — 20611 DRAIN/INJ JOINT/BURSA W/US: CPT | Performed by: EMERGENCY MEDICINE

## 2024-09-18 PROCEDURE — 1036F TOBACCO NON-USER: CPT | Performed by: EMERGENCY MEDICINE

## 2024-09-18 PROCEDURE — 1123F ACP DISCUSS/DSCN MKR DOCD: CPT | Performed by: EMERGENCY MEDICINE

## 2024-09-18 PROCEDURE — 1159F MED LIST DOCD IN RCRD: CPT | Performed by: EMERGENCY MEDICINE

## 2024-09-18 PROCEDURE — 1125F AMNT PAIN NOTED PAIN PRSNT: CPT | Performed by: EMERGENCY MEDICINE

## 2024-09-18 ASSESSMENT — PAIN DESCRIPTION - DESCRIPTORS: DESCRIPTORS: ACHING

## 2024-09-18 ASSESSMENT — PAIN SCALES - GENERAL: PAINLEVEL_OUTOF10: 2

## 2024-09-18 NOTE — PROGRESS NOTES
Euflexxa B/L knee injections    Subjective    Patient ID: Danielle Deshpande is a 69 y.o. female.    Chief Complaint: Pain of the Left Knee (3rd Euflexxa injection Dept provided B/L knees.) and Pain of the Right Knee     Last Surgery: No surgery found  Last Surgery Date: No surgery found    Danielle is a very pleasant 69-year-old female who is here today with her  of 47 years.  She has a history of rheumatoid arthritis and establish care with a new provider back in January.  She takes Plaquenil and is presenting here today after being referred by her PCP, Dr. Pizano.  Patient has been having acute on chronic bilateral knee pain most severe in the left knee and located in both medial compartments.  She has a history of scoliosis and is bowlegged.  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  She had a steroid injection once or twice in the past.  Both worked very well and the last time she had an injection was about 6 years ago.  She takes multiple medications for her rheumatoid arthritis.  She is not currently taking any NSAIDs or Tylenol.  She is interested in a brace if it would help and would like to avoid surgery if possible. We agreed to perform bilateral cortisone injections under ultrasound guidance in her knees here today.  The patient tolerated both procedures well without any complications and activity modifications were reviewed.  She was also given a medial  brace for her left knee to try over the next several weeks and also agreed to begin taking prescription dose Tylenol at 1000 mg 3 times daily.  She will then follow-up with me in about a month to determine her response to this plan and is interested in eventually trying viscosupplementation injections when indicated.    Update on 7/10/2024.  The patient is coming back in for a follow-up visit for her acute on chronic bilateral knee pain secondary to medial  compartment DJD.  We performed bilateral knee cortisone injections about a month ago and she is here today for follow-up visit.  Overall she reports that the left knee is feeling a little bit better.  She thinks that it is about 50% better overall and she is very happy with the medial  brace.  Her right knee is only about 30% better and she is still having it moderate to severe amount of stiffness in right knee joint.  She denies any traumatic events or injuries.  No other complaints here today. We agreed that she has not responded very well to the cortisone injections.  We therefore agreed to pursue the approval of viscosupplementation injections in both knees.  She will continue her pain medication regimen and we are also going to fit her with a right medial  brace since she has responded very well using the one on her left knee.  I will then follow-up with her once her gel injections are approved and arrive.    Update on 9/4/2024.  The patient is coming back in for a follow-up visit to receive her first Euflexxa injection in a series of 3 for her bilateral knee osteoarthritis.  No trauma recently.  No infectious symptoms.  She is still having pain.  No other complaints or today. We performed the Euflexxa injections in both knees under ultrasound guidance.  These were injections 1 and a series of 3.  The patient tolerated the procedures well without any complications and activity modifications were reviewed.  She will follow-up with me next week for her second injections.    9/11/2024.  The patient is coming back in for her second Euflexxa injections saying that she is already starting to experience some improved range of motion and decreased pain.  No complaints here today. We performed Euflexxa injections in both knees under ultrasound guidance.  These were injections 2 in a series of 3.  The patient tolerated the procedures well without any complications and activity modifications were reviewed.   She will follow-up with me next week for her 3rd injections.    Update on 9/18/2024.  The patient is coming back in for her third Euflexxa injections saying that she is still experiencing some improved range of motion and some mild improvement in her pain level.  No other complaints here today.        Objective   Right Knee Exam     Muscle Strength   The patient has normal right knee strength.    Tenderness   The patient is experiencing tenderness in the medial joint line.    Range of Motion   The patient has normal right knee ROM.    Tests   Wolfgang:  Medial - negative Lateral - negative   Valgus: positive  Lachman:  Anterior - negative        Other   Erythema: absent  Sensation: normal  Pulse: present  Swelling: mild    Comments:  Trace effusion      Left Knee Exam     Muscle Strength   The patient has normal left knee strength.    Tenderness   The patient is experiencing tenderness in the medial joint line.    Range of Motion   The patient has normal left knee ROM.    Tests   Wolfgang:  Medial - negative Lateral - negative   Valgus: positive  Lachman:  Anterior - negative        Other   Erythema: absent  Sensation: normal  Pulse: present  Swelling: mild    Comments:  Trace effusion        Exam grossly unchanged today    Image Results:  No new imaging today    Patient ID: Danielle Deshpande is a 69 y.o. female.    L Inj/Asp: bilateral knee on 9/18/2024 10:33 AM  Indications: pain  Details: 22 G needle, ultrasound-guided superolateral approach  Medications (Right): 20 mg sodium hyaluronate 10 mg/mL(mw 2.4 -3.6 million)  Medications (Left): 20 mg sodium hyaluronate 10 mg/mL(mw 2.4 -3.6 million)  Outcome: tolerated well, no immediate complications  Procedure, treatment alternatives, risks and benefits explained, specific risks discussed. Consent was given by the patient. Immediately prior to procedure a time out was called to verify the correct patient, procedure, equipment, support staff and site/side marked as  required. Patient was prepped and draped in the usual sterile fashion.           Assessment/Plan   Encounter Diagnoses:  Primary osteoarthritis of both knees    Orders Placed This Encounter    L Inj/Asp: bilateral knee    Point of Care Ultrasound     No follow-ups on file.    We performed Euflexxa injections in both knees under ultrasound guidance.  These were injections 3 in a series of 3.  The patient tolerated the procedures well without any complications and activity modifications were reviewed.  She will follow-up with me as needed moving forward depending on her response to these injections.    ** Please excuse any errors in grammar or translation related to this dictation. Voice recognition software was utilized to prepare this document. **       Ang Alfaro MD  [facility]

## 2024-09-18 NOTE — TELEPHONE ENCOUNTER
----- Message from Ulises Alcala sent at 9/5/2024  1:22 PM EDT -----  Regarding: L5-S1 foraminal stenosis  MRI does show stenosis at L5-S1 foramen that correlates with her numbness. I called her with results, but can you call and schedule her with Dr. Adamson?

## 2024-09-23 ENCOUNTER — OFFICE VISIT (OUTPATIENT)
Dept: PRIMARY CARE | Facility: CLINIC | Age: 69
End: 2024-09-23
Payer: COMMERCIAL

## 2024-09-23 VITALS
DIASTOLIC BLOOD PRESSURE: 78 MMHG | WEIGHT: 111.4 LBS | HEART RATE: 80 BPM | SYSTOLIC BLOOD PRESSURE: 136 MMHG | OXYGEN SATURATION: 98 % | RESPIRATION RATE: 13 BRPM | BODY MASS INDEX: 23.28 KG/M2 | TEMPERATURE: 97.6 F

## 2024-09-23 DIAGNOSIS — J01.90 ACUTE BACTERIAL SINUSITIS: Primary | ICD-10-CM

## 2024-09-23 DIAGNOSIS — B96.89 ACUTE BACTERIAL SINUSITIS: Primary | ICD-10-CM

## 2024-09-23 PROCEDURE — 99213 OFFICE O/P EST LOW 20 MIN: CPT | Performed by: NURSE PRACTITIONER

## 2024-09-23 PROCEDURE — 3075F SYST BP GE 130 - 139MM HG: CPT | Performed by: NURSE PRACTITIONER

## 2024-09-23 PROCEDURE — 1159F MED LIST DOCD IN RCRD: CPT | Performed by: NURSE PRACTITIONER

## 2024-09-23 PROCEDURE — 1123F ACP DISCUSS/DSCN MKR DOCD: CPT | Performed by: NURSE PRACTITIONER

## 2024-09-23 PROCEDURE — 3078F DIAST BP <80 MM HG: CPT | Performed by: NURSE PRACTITIONER

## 2024-09-23 PROCEDURE — 1036F TOBACCO NON-USER: CPT | Performed by: NURSE PRACTITIONER

## 2024-09-23 PROCEDURE — 1160F RVW MEDS BY RX/DR IN RCRD: CPT | Performed by: NURSE PRACTITIONER

## 2024-09-23 RX ORDER — AMOXICILLIN AND CLAVULANATE POTASSIUM 875; 125 MG/1; MG/1
875 TABLET, FILM COATED ORAL 2 TIMES DAILY
Qty: 20 TABLET | Refills: 0 | Status: SHIPPED
Start: 2024-09-23 | End: 2024-09-23

## 2024-09-23 RX ORDER — DOXYCYCLINE 100 MG/1
100 CAPSULE ORAL 2 TIMES DAILY
Qty: 20 CAPSULE | Refills: 0 | Status: SHIPPED | OUTPATIENT
Start: 2024-09-23 | End: 2024-10-03

## 2024-09-23 ASSESSMENT — ENCOUNTER SYMPTOMS
MYALGIAS: 0
SHORTNESS OF BREATH: 0
WHEEZING: 0
APPETITE CHANGE: 1
COUGH: 1
CHILLS: 0
HEADACHES: 0
SINUS PAIN: 1
SINUS PRESSURE: 1
FEVER: 0
SORE THROAT: 0
DIARRHEA: 0
FATIGUE: 1
NAUSEA: 0
VOMITING: 0
ABDOMINAL PAIN: 0
RHINORRHEA: 0

## 2024-09-23 NOTE — PROGRESS NOTES
Patient was in the ER on 9/14/24 for dysphagia. She says that her left ear and her left maxillary sinus felt congested. Her ear feels clogged. Pt has taken flonase and it helped slightly. Pt has post nasal drainage on the left side of her throat. Pt declines the need for COVID testing.     Review of Systems   Constitutional:  Positive for appetite change and fatigue. Negative for chills and fever.   HENT:  Positive for congestion, ear pain, postnasal drip, sinus pressure and sinus pain. Negative for rhinorrhea and sore throat.    Respiratory:  Positive for cough. Negative for shortness of breath and wheezing.    Gastrointestinal:  Negative for abdominal pain, diarrhea, nausea and vomiting.   Musculoskeletal:  Negative for myalgias.   Neurological:  Negative for headaches.       Objective   /78   Pulse 80   Temp 36.4 °C (97.6 °F) (Skin)   Resp 13   Wt 50.5 kg (111 lb 6.4 oz)   SpO2 98%   BMI 23.28 kg/m²     Physical Exam  Vitals reviewed.   Constitutional:       General: She is not in acute distress.     Appearance: Normal appearance. She is not toxic-appearing.   HENT:      Head: Atraumatic.      Right Ear: Tympanic membrane, ear canal and external ear normal.      Left Ear: Ear canal and external ear normal. A middle ear effusion is present.      Nose: Congestion present. No rhinorrhea.      Right Sinus: No maxillary sinus tenderness.      Left Sinus: Maxillary sinus tenderness present.      Mouth/Throat:      Mouth: Mucous membranes are moist.      Pharynx: Oropharynx is clear. No oropharyngeal exudate.   Eyes:      Conjunctiva/sclera: Conjunctivae normal.   Cardiovascular:      Rate and Rhythm: Normal rate and regular rhythm.      Heart sounds: Normal heart sounds. No murmur heard.  Pulmonary:      Effort: Pulmonary effort is normal.      Breath sounds: Normal breath sounds. No wheezing or rhonchi.   Musculoskeletal:         General: Normal range of motion.   Skin:     General: Skin is warm and dry.    Neurological:      General: No focal deficit present.      Mental Status: She is alert.     Assessment/Plan   Problem List Items Addressed This Visit    None  Visit Diagnoses         Codes    Acute bacterial sinusitis    -  Primary J01.90, B96.89    Relevant Medications    doxycycline (Vibramycin) 100 mg capsule        Patient started on doxycycline at this time. She is to continue to use flonase. Advised patient on use of humidifier and hot steam treatments. Discussed that patient is to drink plenty of fluids and stay well hydrated. Can take tylenol as needed for any fevers or discomfort. Discussed that patient is to go to the ER for any chest pain, difficulty breathing, shortness of breath or new/concerning symptoms; she agreed. Pt to follow up in 2-3 days if no better despite the use of the medications; she agreed.

## 2024-10-18 ENCOUNTER — DOCUMENTATION (OUTPATIENT)
Dept: INFUSION THERAPY | Facility: CLINIC | Age: 69
End: 2024-10-18
Payer: COMMERCIAL

## 2024-10-18 DIAGNOSIS — M81.0 OSTEOPOROSIS, POST-MENOPAUSAL: Primary | ICD-10-CM

## 2024-10-18 RX ORDER — ALBUTEROL SULFATE 0.83 MG/ML
3 SOLUTION RESPIRATORY (INHALATION) AS NEEDED
OUTPATIENT
Start: 2024-10-21

## 2024-10-18 RX ORDER — ZOLEDRONIC ACID 5 MG/100ML
5 INJECTION, SOLUTION INTRAVENOUS ONCE
OUTPATIENT
Start: 2024-10-21

## 2024-10-18 RX ORDER — ZOLEDRONIC ACID 5 MG/100ML
5 INJECTION, SOLUTION INTRAVENOUS
Qty: 100 ML | Refills: 0 | OUTPATIENT
Start: 2024-10-18

## 2024-10-18 RX ORDER — FAMOTIDINE 10 MG/ML
20 INJECTION INTRAVENOUS ONCE AS NEEDED
OUTPATIENT
Start: 2024-10-21

## 2024-10-18 RX ORDER — DIPHENHYDRAMINE HYDROCHLORIDE 50 MG/ML
50 INJECTION INTRAMUSCULAR; INTRAVENOUS AS NEEDED
OUTPATIENT
Start: 2024-10-21

## 2024-10-18 RX ORDER — EPINEPHRINE 0.3 MG/.3ML
0.3 INJECTION SUBCUTANEOUS EVERY 5 MIN PRN
OUTPATIENT
Start: 2024-10-21

## 2024-10-18 NOTE — PROGRESS NOTES
CLINICAL CLEARANCE FOR OUTPATIENT INFUSION      Patient to be scheduled for  New Start of Reclast infusions  For Diagnosis: Osteoporosis     Labs… (must be within 28 days of scheduled infusion)  Lab Results   Component Value Date    CREATININE 0.73 09/05/2024    There were no vitals filed for this visit.  CrCl: 48.8 (hold / contact prescribing provider if <35ml/min)       Lab Results   Component Value Date    CALCIUM 9.7 09/05/2024    PHOS 3.3 02/29/2024      Lab Results   Component Value Date    ALBUMIN 4.1 09/05/2024        Calcium and Vitamin D supplement noted on medication list? Yes  (if no nurse to encourage discussion of supplementation at visit)  Current Outpatient Medications on File Prior to Visit   Medication Sig Dispense Refill    amitriptyline (Elavil) 10 mg tablet Take 1 tablet (10 mg) by mouth once daily at bedtime. 90 tablet 3    biotin 5,000 mcg tablet,disintegrating Take 1 tablet daily.      calcium carbonate (Oscal) 500 mg calcium (1,250 mg) tablet Take 1 tablet (1,250 mg) by mouth once daily.      cholecalciferol (Vitamin D-3) 50 mcg (2,000 unit) capsule Take 1 capsule daily      cranberry 500 mg capsule Take 1 capsule by mouth once daily.      docusate sodium (Colace) 100 mg capsule Take 1 capsule (100 mg) by mouth once daily.      estradiol (Estrace) 0.01 % (0.1 mg/gram) vaginal cream Insert 0.25 Applicatorfuls (1 g) into the vagina 2 times a week. 42.5 g 5    glucosamine-chondroitin (Osteo Bi-Flex) 250-200 mg tablet Take 1 tablet by mouth 2 times a day.      hydroxychloroquine (Plaquenil) 200 mg tablet Take 1 tablet (200 mg) by mouth once daily. 90 tablet 1    leflunomide (Arava) 20 mg tablet Take 1 tablet (20 mg) by mouth once daily. 180 tablet 1    lisinopril 10 mg tablet Take 1 tablet (10 mg) by mouth once daily. 90 tablet 3    magnesium glycinate 100 mg tablet Take 1 tablet by mouth once daily.      metoprolol succinate XL (Toprol-XL) 50 mg 24 hr tablet Take 1 tablet (50 mg) by mouth once  daily. Do not crush or chew.- has been taking 50 mg daily 90 tablet 3    multivitamin with minerals tablet Take 0.5 tablets by mouth once daily.      turmeric root extract 500 mg capsule Take 1 capsule by mouth 2 times a day.      zoledronic acid (Reclast) 5 mg/100 mL piggyback Infuse 100 mL (5 mg) at 400 mL/hr over 15 minutes into a venous catheter yearly. 100 mL 0    [DISCONTINUED] alendronate (Fosamax) 70 mg tablet Take 1 tablet (70 mg) by mouth every 7 days. On Sundays. Take on an empty stomach. Do not lie down or eat for 1/2 hour after taking. 12 tablet 3     No current facility-administered medications on file prior to visit.        Is the patient receiving or have an allergy to Zometa? No  Allergies   Allergen Reactions    Diphenhydramine Hcl Other     Confusion     Levaquin [Levofloxacin] Other     Confusion    Novocain [Procaine] Other     Palpatations        No obvious recent dental work per chart review. Nurse to confirm no dental within past/next 4 weeks at encounter.    Urine Hcg test ordered prior to first infusion? Not applicable (If female pt <60 years of age and with reproductive capability)  (If no please order)    Start date: anytime - insurance has approved. Patient will be called to schedule the appt in the next week.    Labs drawn no more than 30 days prior to their scheduled appointment    Okay to schedule for treatment as ordered by prescribing provider.     [facility]   [facility]  [id number] [place] [address] [address]  [place] [address]  Phone: [phone]  Fax: [phone]  [email or web address]

## 2024-10-18 NOTE — PROGRESS NOTES
Per Dr. Alonso- Patient with OP on fosamax, having terrible GERD and chest pain   Can we get PA for Reclast please?     Entering Reclast therapy plan for [facility] , CMP already ordered

## 2024-10-28 ENCOUNTER — HOSPITAL ENCOUNTER (OUTPATIENT)
Dept: RADIOLOGY | Facility: HOSPITAL | Age: 69
Discharge: HOME | End: 2024-10-28
Payer: COMMERCIAL

## 2024-10-28 VITALS — WEIGHT: 114 LBS | BODY MASS INDEX: 23.93 KG/M2 | HEIGHT: 58 IN

## 2024-10-28 DIAGNOSIS — Z12.31 BREAST CANCER SCREENING BY MAMMOGRAM: ICD-10-CM

## 2024-10-28 PROCEDURE — 77063 BREAST TOMOSYNTHESIS BI: CPT | Performed by: RADIOLOGY

## 2024-10-28 PROCEDURE — 77067 SCR MAMMO BI INCL CAD: CPT

## 2024-10-28 PROCEDURE — 77067 SCR MAMMO BI INCL CAD: CPT | Performed by: RADIOLOGY

## 2024-12-02 ENCOUNTER — APPOINTMENT (OUTPATIENT)
Dept: GASTROENTEROLOGY | Facility: CLINIC | Age: 69
End: 2024-12-02
Payer: COMMERCIAL

## 2024-12-02 VITALS
HEART RATE: 79 BPM | SYSTOLIC BLOOD PRESSURE: 159 MMHG | HEIGHT: 58 IN | OXYGEN SATURATION: 96 % | WEIGHT: 113 LBS | DIASTOLIC BLOOD PRESSURE: 96 MMHG | RESPIRATION RATE: 18 BRPM | BODY MASS INDEX: 23.72 KG/M2

## 2024-12-02 DIAGNOSIS — R13.10 DYSPHAGIA, UNSPECIFIED TYPE: ICD-10-CM

## 2024-12-02 DIAGNOSIS — K21.9 GASTROESOPHAGEAL REFLUX DISEASE, UNSPECIFIED WHETHER ESOPHAGITIS PRESENT: Primary | ICD-10-CM

## 2024-12-02 PROCEDURE — 1123F ACP DISCUSS/DSCN MKR DOCD: CPT | Performed by: NURSE PRACTITIONER

## 2024-12-02 PROCEDURE — 3077F SYST BP >= 140 MM HG: CPT | Performed by: NURSE PRACTITIONER

## 2024-12-02 PROCEDURE — 1160F RVW MEDS BY RX/DR IN RCRD: CPT | Performed by: NURSE PRACTITIONER

## 2024-12-02 PROCEDURE — 1036F TOBACCO NON-USER: CPT | Performed by: NURSE PRACTITIONER

## 2024-12-02 PROCEDURE — 3080F DIAST BP >= 90 MM HG: CPT | Performed by: NURSE PRACTITIONER

## 2024-12-02 PROCEDURE — 99213 OFFICE O/P EST LOW 20 MIN: CPT | Performed by: NURSE PRACTITIONER

## 2024-12-02 PROCEDURE — 1159F MED LIST DOCD IN RCRD: CPT | Performed by: NURSE PRACTITIONER

## 2024-12-02 PROCEDURE — 3008F BODY MASS INDEX DOCD: CPT | Performed by: NURSE PRACTITIONER

## 2024-12-02 RX ORDER — OMEPRAZOLE 40 MG/1
40 CAPSULE, DELAYED RELEASE ORAL
Qty: 90 CAPSULE | Refills: 3 | Status: SHIPPED | OUTPATIENT
Start: 2024-12-02 | End: 2025-12-02

## 2024-12-02 NOTE — PROGRESS NOTES
Subjective   Patient ID: Danielle Deshpande is a 69 y.o. female who presents for Dysphagia (ED in Sept for dysphagia. She states that she has had some issues swallowing in the past with dry foods. Feels food getting stuck in esophagus. After eating munira chips, she felt one get stuck. She felt liquid coming up throat when this happened. She feels like she has GERD, burning/heaviness. Has never had EGD.).  HPI  Background:  Patient seen in the ED on 9/14/2024 with dysphagia after eating lunch.  She had complaints of increased mucus and gurgling.  Her symptoms spontaneously resolved and she was discharged home.      Patient presents to the GI clinic with complaints of dysphagia to solids only.  This waxes and wanes.  She has been experiencing this for the past 1 to 2 years.  She reports increased GERD with belching.  Was taking Fosamax, this made her GERD symptoms worse.  She discontinued this medication 2 months ago.  No nausea or vomiting.  Weight and appetite are stable.  No smoking, excessive alcohol or NSAID use.  Will occasionally experience left sided abdominal pain, nothing for the past week.    ->Colonoscopy 2023:  Scattered diverticulosis of moderate severity causing mild luminal narrowing in the descending colon and sigmoid colon  Hemorrhoids  Subcentimeter polyp in the sigmoid colon was removed with cold snare     Current Outpatient Medications on File Prior to Visit   Medication Sig Dispense Refill    amitriptyline (Elavil) 10 mg tablet Take 1 tablet (10 mg) by mouth once daily at bedtime. 90 tablet 3    biotin 5,000 mcg tablet,disintegrating Take 1 tablet daily.      calcium carbonate (Oscal) 500 mg calcium (1,250 mg) tablet Take 1 tablet (1,250 mg) by mouth once daily.      cholecalciferol (Vitamin D-3) 50 mcg (2,000 unit) capsule Take 1 capsule daily      cranberry 500 mg capsule Take 1 capsule by mouth once daily.      docusate sodium (Colace) 100 mg capsule Take 1 capsule (100 mg) by mouth once daily.       estradiol (Estrace) 0.01 % (0.1 mg/gram) vaginal cream Insert 0.25 Applicatorfuls (1 g) into the vagina 2 times a week. 42.5 g 5    glucosamine-chondroitin (Osteo Bi-Flex) 250-200 mg tablet Take 1 tablet by mouth 2 times a day.      hydroxychloroquine (Plaquenil) 200 mg tablet Take 1 tablet (200 mg) by mouth once daily. 90 tablet 1    leflunomide (Arava) 20 mg tablet Take 1 tablet (20 mg) by mouth once daily. 180 tablet 1    lisinopril 10 mg tablet Take 1 tablet (10 mg) by mouth once daily. 90 tablet 3    magnesium glycinate 100 mg tablet Take 1 tablet by mouth once daily.      metoprolol succinate XL (Toprol-XL) 50 mg 24 hr tablet Take 1 tablet (50 mg) by mouth once daily. Do not crush or chew.- has been taking 50 mg daily 90 tablet 3    multivitamin with minerals tablet Take 0.5 tablets by mouth once daily.      turmeric root extract 500 mg capsule Take 1 capsule by mouth 2 times a day.      zoledronic acid (Reclast) 5 mg/100 mL piggyback Infuse 100 mL (5 mg) at 400 mL/hr over 15 minutes into a venous catheter yearly. 100 mL 0     No current facility-administered medications on file prior to visit.        Review of Systems   All other systems reviewed and are negative.      Objective   Physical Exam  Vitals reviewed.   Constitutional:       General: She is awake. She is not in acute distress.     Appearance: Normal appearance. She is well-developed and normal weight. She is not ill-appearing, toxic-appearing or diaphoretic.   HENT:      Head: Normocephalic and atraumatic.   Eyes:      General: No scleral icterus.     Pupils: Pupils are equal, round, and reactive to light.   Cardiovascular:      Rate and Rhythm: Normal rate and regular rhythm.      Heart sounds: Normal heart sounds. No murmur heard.  Pulmonary:      Effort: Pulmonary effort is normal. No respiratory distress.      Breath sounds: Normal breath sounds.   Abdominal:      General: Abdomen is flat. Bowel sounds are normal.      Palpations: Abdomen is  "soft. There is no hepatomegaly.      Tenderness: There is no abdominal tenderness. There is no guarding or rebound.   Musculoskeletal:         General: Normal range of motion.      Cervical back: Normal range of motion.      Right lower leg: No edema.      Left lower leg: No edema.      Comments: Scoliosis;kyphotic->right    Skin:     General: Skin is warm and dry.      Coloration: Skin is not jaundiced or pale.   Neurological:      General: No focal deficit present.      Mental Status: She is alert and oriented to person, place, and time.      Motor: No weakness.      Gait: Gait normal.   Psychiatric:         Mood and Affect: Mood normal.         Behavior: Behavior is cooperative.         Thought Content: Thought content normal.         Judgment: Judgment normal.       BP (!) 159/96   Pulse 79   Resp 18   Ht 1.473 m (4' 10\")   Wt 51.3 kg (113 lb)   SpO2 96%   BMI 23.62 kg/m²      Lab Results   Component Value Date    WBC 4.0 (L) 09/05/2024    HGB 12.9 09/05/2024    HCT 40.2 09/05/2024    MCV 93 09/05/2024     09/05/2024           Lab Results   Component Value Date    TSH 2.37 02/29/2024         Assessment/Plan   Diagnoses and all orders for this visit:  Gastroesophageal reflux disease, unspecified whether esophagitis present    Acid suppression medication    - Advised on the correct dose and timing of the PPI, Preferably 1/2 hour before breakfast.   - Anti-reflux lifestyle modification discussed at length   --Avoid spicy acidic based foods   --Limit coffee, tea   --Limit the amount of food during meal time, avoid gorging   --Avoid bedtime snacks and eat meals 3 to 4 hrs before lying down   --Elevate the head of the bed with blocks  - EGD requested to assess/exclude for esophagitis/hiatal hernia. (Procedure risks and instruction discussed).  -     omeprazole (PriLOSEC) 40 mg DR capsule; Take 1 capsule (40 mg) by mouth once daily in the morning. Take before meals. Do not crush or chew.  Dysphagia, " unspecified type  Dysphagia to solids only.  Was seen in the ED in September, 2024 with possible food bolus that spontaneously resolved.  EGD next.  Need to rule out Schatzki ring, esophagitis, GE junction mass, dysmotility?  -     Esophagogastroduodenoscopy (EGD); Future      Anna Silva CNP

## 2025-01-09 ENCOUNTER — OFFICE VISIT (OUTPATIENT)
Facility: CLINIC | Age: 70
End: 2025-01-09
Payer: COMMERCIAL

## 2025-01-09 VITALS
SYSTOLIC BLOOD PRESSURE: 142 MMHG | HEIGHT: 59 IN | WEIGHT: 110 LBS | BODY MASS INDEX: 22.18 KG/M2 | DIASTOLIC BLOOD PRESSURE: 90 MMHG | TEMPERATURE: 97.4 F

## 2025-01-09 DIAGNOSIS — M41.35 THORACOGENIC SCOLIOSIS OF THORACOLUMBAR REGION: Primary | ICD-10-CM

## 2025-01-09 ASSESSMENT — PATIENT HEALTH QUESTIONNAIRE - PHQ9
2. FEELING DOWN, DEPRESSED OR HOPELESS: NOT AT ALL
1. LITTLE INTEREST OR PLEASURE IN DOING THINGS: NOT AT ALL
SUM OF ALL RESPONSES TO PHQ9 QUESTIONS 1 & 2: 0

## 2025-01-09 ASSESSMENT — PAIN SCALES - GENERAL: PAINLEVEL_OUTOF10: 4

## 2025-01-09 NOTE — PROGRESS NOTES
[facility]  Department of Neurological Surgery  Established Patient Visit    History of Present Illness:  Danielle Deshpande is a 69 y.o. year old female who presents to the spine clinic in follow up with bilateral lower extremity numbness and tingling right greater than left.  She states that approximately 1 and half years ago she started to experience dullness in her right foot.  Since this was noticed it has gradually spread proximally as well as included the left leg as well.  It is somewhat symmetric though continues right more significant than left.  She denies any new radiating pain down her leg though she continues with a constant dull ache in right lower lumbar region.  She does have history of thoracolumbar scoliosis surgery in 1980 and single charles with laminar hooks placed with spinal tether wires.  Because of the surgery she has continued in a low level of generalized aches and pains and likely exacerbated by diagnoses of osteoarthritis and rheumatoid arthritis.  She endorses being a  over the past 21 years and raising their 5 kids prior to this, exhibiting how she has spent a long time standing on her feet.  Her symptoms are worsened at the end of the day when she has been standing during this time.  She has noticed the most at night when she lays down and has a sensation that she is still wearing her socks when in fact she has not.  Denies any bowel or bladder concerns or foot drop.  Denies diabetes or chemotherapy.  EMG results obtained showing a mild chronic L5 radiculopathy.      Since her last visit, her symptoms have not changed. She continues with L4 and L5 lumbar radiculopathy, left leg worse than right.    Patient's BMI is Body mass index is 22.6 kg/m².    Diabetic: no     Osteoporosis: borderline  No DXA results found for the past 12 months    Review of Systems:  14/14 systems reviewed and negative other than what is listed in the history of present  illness    Patient Active Problem List   Diagnosis    Atrophy of vagina    Baker cyst, right    Benign essential hypertension    Cystocele with uterine prolapse    Dental caries extending into dentin    Kyphoscoliosis    Migraine    Mitral regurgitation    Mixed incontinence urge and stress    Nephrolithiasis    Osteoporosis, post-menopausal    Rheumatoid arthritis    Spinal stenosis    Tricuspid regurgitation    Vitamin D deficiency    Felton-Parkinson-White (WPW) syndrome    Personal history of colonic polyps    Sensorineural hearing loss, bilateral    Lumbosacral radiculopathy at L5     Past Medical History:   Diagnosis Date    Acute recurrent sinusitis, unspecified 2022    Acute recurrent sinusitis, unspecified location    Arthritis 2010    Colon polyp 1986    Heart disease     WPW    Hypertension 1993    Other signs and symptoms in breast 2014    Inversion of nipple    Personal history of other diseases of the circulatory system 2014    History of hypertension    Personal history of other diseases of the female genital tract 2020    History of postmenopausal bleeding    Personal history of other diseases of the female genital tract 2022    History of vaginitis    Personal history of other diseases of the musculoskeletal system and connective tissue 2014    Personal history of scoliosis    Personal history of other specified conditions 2014    History of lump of right breast    Personal history of other specified conditions 2020    History of fatigue    PONV (postoperative nausea and vomiting) 1980    Unspecified hydronephrosis 2020    Hydronephrosis of right kidney     Past Surgical History:   Procedure Laterality Date    BACK SURGERY  2014    Back Surgery    BREAST BIOPSY  2014    Biopsy Breast Open     SECTION, LOW TRANSVERSE  1991    COLONOSCOPY  2014    Colonoscopy (Fiberoptic)    OTHER SURGICAL HISTORY  2022     Percutaneous nephrolithotomy    OTHER SURGICAL HISTORY  01/18/2022    Renal lithotripsy     Social History     Tobacco Use    Smoking status: Never    Smokeless tobacco: Never   Substance Use Topics    Alcohol use: Not Currently     family history includes Cancer in her father; Emphysema in her father; Hypertension in her mother; Non-Hodkins Lymphoma in her mother; Stomach cancer in her mother's brother; Thyroid disease in her mother.    Current Outpatient Medications:     amitriptyline (Elavil) 10 mg tablet, Take 1 tablet (10 mg) by mouth once daily at bedtime., Disp: 90 tablet, Rfl: 3    biotin 5,000 mcg tablet,disintegrating, Take 1 tablet daily., Disp: , Rfl:     calcium carbonate (Oscal) 500 mg calcium (1,250 mg) tablet, Take 1 tablet (1,250 mg) by mouth once daily., Disp: , Rfl:     cholecalciferol (Vitamin D-3) 50 mcg (2,000 unit) capsule, Take 1 capsule daily, Disp: , Rfl:     cranberry 500 mg capsule, Take 1 capsule by mouth once daily., Disp: , Rfl:     docusate sodium (Colace) 100 mg capsule, Take 1 capsule (100 mg) by mouth once daily., Disp: , Rfl:     estradiol (Estrace) 0.01 % (0.1 mg/gram) vaginal cream, Insert 0.25 Applicatorfuls (1 g) into the vagina 2 times a week., Disp: 42.5 g, Rfl: 5    glucosamine-chondroitin (Osteo Bi-Flex) 250-200 mg tablet, Take 1 tablet by mouth 2 times a day., Disp: , Rfl:     hydroxychloroquine (Plaquenil) 200 mg tablet, Take 1 tablet (200 mg) by mouth once daily., Disp: 90 tablet, Rfl: 1    leflunomide (Arava) 20 mg tablet, Take 1 tablet (20 mg) by mouth once daily., Disp: 180 tablet, Rfl: 1    lisinopril 10 mg tablet, Take 1 tablet (10 mg) by mouth once daily., Disp: 90 tablet, Rfl: 3    magnesium glycinate 100 mg tablet, Take 1 tablet by mouth once daily., Disp: , Rfl:     metoprolol succinate XL (Toprol-XL) 50 mg 24 hr tablet, Take 1 tablet (50 mg) by mouth once daily. Do not crush or chew.- has been taking 50 mg daily, Disp: 90 tablet, Rfl: 3    multivitamin with  minerals tablet, Take 0.5 tablets by mouth once daily., Disp: , Rfl:     omeprazole (PriLOSEC) 40 mg DR capsule, Take 1 capsule (40 mg) by mouth once daily in the morning. Take before meals. Do not crush or chew., Disp: 90 capsule, Rfl: 3    turmeric root extract 500 mg capsule, Take 1 capsule by mouth 2 times a day., Disp: , Rfl:     zoledronic acid (Reclast) 5 mg/100 mL piggyback, Infuse 100 mL (5 mg) at 400 mL/hr over 15 minutes into a venous catheter yearly. (Patient not taking: Reported on 1/9/2025), Disp: 100 mL, Rfl: 0  Allergies   Allergen Reactions    Diphenhydramine Hcl Other     Confusion     Levaquin [Levofloxacin] Other     Confusion    Novocain [Procaine] Other     Palpatations       Physical Examination:    General: Well developed, awake/alert/oriented x3, no distress, alert and cooperative  Skin: Warm and dry, no lesions, no rashes  ENMT: Mucous membranes moist, no apparent injury, no lesions seen  Head/Neck: Neck Supple, no apparent injury  Respiratory/Thorax: Normal breath sounds with good chest expansion, thorax symmetric  Cardiovascular: No pitting edema, no JVD    Motor Strength: 5/5 Throughout all extremities    Muscle Bulk: Normal and symmetric in all extremities    Posture:   -- Cervical: Normal  -- Thoracic: Normal  -- Lumbar : significant scoliosis  Paraspinal muscle spasm/tenderness absent.     Sensation: intact to light touch    Results:  I personally reviewed and interpreted the imaging results which included corrected adolescent idiopathic scoliosis with a large construct from the mid thoracic spine to the L3 vertebra. Significant adjacent segment disease with asymmetric collapse of the disc spaces and moderate to severe foraminal stenosis L3-S1.     decompensated adult scoliosis.  att, has borderline osteo. has lumb rad but can perform ADLs rn. had long discuss abt rnb of major reosnctruice operation and encouraged her to pursue cons options first. make ref for spinal stumlator eval  and pain mgmt for injections. will fu with me for srugical considerations.     Assessment and Plan:      Danielle Deshpande is a 69 y.o. year old female who presents to the spine clinic in follow up with decompensated adult scoliosis. At this time, she has borderline osteoporosis and lumbar radiculopathy but is able to perform all ADLs. I had a long discussion with her about the risks and benefits of a major reconstructive operation and encouraged her to pursue conservative options first. I will make a referral for a spinal cord stimulator evaluation and injections. She will follow up with me for surgical considerations.      I have reviewed all prior documentation and reviewed the electronic medical record since admission. I have personally have reviewed all advanced imaging not just the reports and used my interpretation as documented as the relevant findings. I have reviewed the risks and benefits of all treatment recommendations listed in this note with the patient and family.       The above clinical summary has been dictated with voice recognition software. It has not been proofread for grammatical errors, typographical mistakes, or other semantic inconsistencies.    Thank you for visiting our office today. It was our pleasure to take part in your healthcare.     Do not hesitate to call with any questions regarding your plan of care after leaving at [phone] M-F 8am-4pm.     To clinicians, thank you very much for this kind referral. It is a privilege to partner with you in the care of your patients. My office would be delighted to assist you with any further consultations or with questions regarding the plan of care outlined. Do not hesitate to call the office or contact me directly.       Sincerely,      Edward Adamson MD, [facility]  Spine , [facility]  Gonzalo SINGLETON and An Lau Chair in Spinal Neurosurgery  Complex Spine Surgery Fellowship  Director   of Neurological Surgery  [facility] [employer]  Phone: [phone]  Fax: [phone]        Scribe Attestation  By signing my name below, I, Gopi Grijalva   attest that this documentation has been prepared under the direction and in the presence of Edward Adamson MD.

## 2025-01-13 ENCOUNTER — ANESTHESIA (OUTPATIENT)
Dept: GASTROENTEROLOGY | Facility: HOSPITAL | Age: 70
End: 2025-01-13
Payer: COMMERCIAL

## 2025-01-13 ENCOUNTER — HOSPITAL ENCOUNTER (OUTPATIENT)
Dept: GASTROENTEROLOGY | Facility: HOSPITAL | Age: 70
Discharge: HOME | End: 2025-01-13
Payer: COMMERCIAL

## 2025-01-13 ENCOUNTER — ANESTHESIA EVENT (OUTPATIENT)
Dept: GASTROENTEROLOGY | Facility: HOSPITAL | Age: 70
End: 2025-01-13
Payer: COMMERCIAL

## 2025-01-13 VITALS
DIASTOLIC BLOOD PRESSURE: 64 MMHG | RESPIRATION RATE: 18 BRPM | BODY MASS INDEX: 23.09 KG/M2 | HEIGHT: 58 IN | HEART RATE: 72 BPM | WEIGHT: 110 LBS | TEMPERATURE: 97.5 F | SYSTOLIC BLOOD PRESSURE: 124 MMHG | OXYGEN SATURATION: 93 %

## 2025-01-13 DIAGNOSIS — K21.9 GASTROESOPHAGEAL REFLUX DISEASE, UNSPECIFIED WHETHER ESOPHAGITIS PRESENT: ICD-10-CM

## 2025-01-13 DIAGNOSIS — K25.3 ACUTE GASTRIC ULCER WITHOUT HEMORRHAGE OR PERFORATION: Primary | ICD-10-CM

## 2025-01-13 DIAGNOSIS — R13.10 DYSPHAGIA, UNSPECIFIED TYPE: ICD-10-CM

## 2025-01-13 PROCEDURE — 7100000010 HC PHASE TWO TIME - EACH INCREMENTAL 1 MINUTE

## 2025-01-13 PROCEDURE — A43239 PR EDG TRANSORAL BIOPSY SINGLE/MULTIPLE: Performed by: ANESTHESIOLOGY

## 2025-01-13 PROCEDURE — 3700000002 HC GENERAL ANESTHESIA TIME - EACH INCREMENTAL 1 MINUTE

## 2025-01-13 PROCEDURE — A43239 PR EDG TRANSORAL BIOPSY SINGLE/MULTIPLE: Performed by: NURSE ANESTHETIST, CERTIFIED REGISTERED

## 2025-01-13 PROCEDURE — 7100000009 HC PHASE TWO TIME - INITIAL BASE CHARGE

## 2025-01-13 PROCEDURE — 3700000001 HC GENERAL ANESTHESIA TIME - INITIAL BASE CHARGE

## 2025-01-13 PROCEDURE — 43239 EGD BIOPSY SINGLE/MULTIPLE: CPT | Performed by: INTERNAL MEDICINE

## 2025-01-13 PROCEDURE — 2500000004 HC RX 250 GENERAL PHARMACY W/ HCPCS (ALT 636 FOR OP/ED): Performed by: NURSE ANESTHETIST, CERTIFIED REGISTERED

## 2025-01-13 RX ORDER — OMEPRAZOLE 40 MG/1
40 CAPSULE, DELAYED RELEASE ORAL
Qty: 120 CAPSULE | Refills: 0 | Status: SHIPPED | OUTPATIENT
Start: 2025-01-13 | End: 2025-03-14

## 2025-01-13 RX ORDER — LIDOCAINE HYDROCHLORIDE 20 MG/ML
INJECTION, SOLUTION INFILTRATION; PERINEURAL AS NEEDED
Status: DISCONTINUED | OUTPATIENT
Start: 2025-01-13 | End: 2025-01-13

## 2025-01-13 RX ORDER — ONDANSETRON HYDROCHLORIDE 2 MG/ML
INJECTION, SOLUTION INTRAVENOUS AS NEEDED
Status: DISCONTINUED | OUTPATIENT
Start: 2025-01-13 | End: 2025-01-13

## 2025-01-13 RX ORDER — PROPOFOL 10 MG/ML
INJECTION, EMULSION INTRAVENOUS AS NEEDED
Status: DISCONTINUED | OUTPATIENT
Start: 2025-01-13 | End: 2025-01-13

## 2025-01-13 RX ORDER — OMEPRAZOLE 40 MG/1
40 CAPSULE, DELAYED RELEASE ORAL
Qty: 120 CAPSULE | Refills: 0 | Status: SHIPPED | OUTPATIENT
Start: 2025-01-13 | End: 2025-01-13 | Stop reason: SDUPTHER

## 2025-01-13 RX ADMIN — ONDANSETRON 4 MG: 2 INJECTION, SOLUTION INTRAMUSCULAR; INTRAVENOUS at 09:06

## 2025-01-13 RX ADMIN — PROPOFOL 100 MG: 10 INJECTION, EMULSION INTRAVENOUS at 09:08

## 2025-01-13 RX ADMIN — LIDOCAINE HYDROCHLORIDE 40 MG: 20 INJECTION, SOLUTION INFILTRATION; PERINEURAL at 09:06

## 2025-01-13 RX ADMIN — PROPOFOL 30 MG: 10 INJECTION, EMULSION INTRAVENOUS at 09:20

## 2025-01-13 RX ADMIN — PROPOFOL 30 MG: 10 INJECTION, EMULSION INTRAVENOUS at 09:14

## 2025-01-13 SDOH — HEALTH STABILITY: MENTAL HEALTH: CURRENT SMOKER: 0

## 2025-01-13 ASSESSMENT — PAIN SCALES - GENERAL: PAINLEVEL_OUTOF10: 0 - NO PAIN

## 2025-01-13 ASSESSMENT — COLUMBIA-SUICIDE SEVERITY RATING SCALE - C-SSRS
2. HAVE YOU ACTUALLY HAD ANY THOUGHTS OF KILLING YOURSELF?: NO
6. HAVE YOU EVER DONE ANYTHING, STARTED TO DO ANYTHING, OR PREPARED TO DO ANYTHING TO END YOUR LIFE?: NO
1. IN THE PAST MONTH, HAVE YOU WISHED YOU WERE DEAD OR WISHED YOU COULD GO TO SLEEP AND NOT WAKE UP?: NO

## 2025-01-13 ASSESSMENT — PAIN - FUNCTIONAL ASSESSMENT: PAIN_FUNCTIONAL_ASSESSMENT: 0-10

## 2025-01-13 NOTE — DISCHARGE INSTRUCTIONS
Patient Instructions Post Endoscopy Procedure      The anesthetics, sedatives or narcotics which were given to you today will be acting in your body for the next 24 hours, so you might feel a little sleepy or groggy.  This feeling should slowly wear off. Carefully read and follow the instructions.     You received sedation today:  - Do not drive or operate any machinery or power tools of any kind.   - No alcoholic beverages today, not even beer or wine.  - Do not make any important decisions or sign any legal documents.  - No over the counter medications that contain alcohol or that may cause drowsiness.    While it is common to experience mild to moderate abdominal distention, gas, or belching after your procedure, if any of these symptoms occur following discharge from the GI Lab or within one week of having your procedure, call the [facility] [place] to be advised whether a visit to your nearest Urgent Care or Emergency Department is indicated.  Take this paper with you if you go.   - If you develop an allergic reaction to the medications that were given during your procedure such as difficulty breathing, rash, hives, severe nausea, vomiting or lightheadedness.  - If you experience chest pain, shortness of breath, severe abdominal pain, fevers and chills.  -If you develop signs and symptoms of bleeding such as blood in your spit, if your stools turn black, tarry, or bloody  - If you have not urinated within 8 hours following your procedure.  - If your IV site becomes painful, red, inflamed, or looks infected.      Your physician recommends the additional following instructions:    -You have a contact number available for emergencies. The signs and symptoms of potential delayed complications were discussed with you. You may return to normal activities tomorrow.  -Resume your previous diet or other if specified.  -Continue your present medications.   -We are waiting for your pathology results, if  applicable. The results will be available in [employer]. I will send you a message with any recommendations.  -The findings and recommendations have been discussed with you and/or family.  -Please see Medication Reconciliation Form for new medication/medications prescribed.       In the event of an emergency please go to the closest Emergency Department or call Dr. Ng at [phone]

## 2025-01-13 NOTE — H&P
Outpatient Hospital Procedure    Patient Profile-Procedures  Initial Info  Patient Demographics  Name Danielle Deshpande  Date of Birth 1955  MRN [id number]  Address   [address] [id number] [place] [address]    Primary Phone Number [phone]  Secondary Phone Number    PCP [place]    Procedures   EGD      Indication:  Dysphagia - recently on fosamex    Primary contact name and number   Extended Emergency Contact Information  Primary Emergency Contact: Adalberto Deshpande  Home Phone: [phone]  Relation: Spouse    General Health  Weight   Vitals:    01/13/25 0718   Weight: 49.9 kg (110 lb)     BMI Body mass index is 22.99 kg/m².    Allergies  Allergies   Allergen Reactions    Diphenhydramine Hcl Other     Confusion     Levaquin [Levofloxacin] Other     Confusion    Novocain [Procaine] Other     Palpatations       Past Medical History   Past Medical History:   Diagnosis Date    Acute recurrent sinusitis, unspecified 02/04/2022    Acute recurrent sinusitis, unspecified location    Arthritis 2010    Colon polyp 1986    Heart disease     WPW    Hypertension 1993    Other signs and symptoms in breast 06/11/2014    Inversion of nipple    Personal history of other diseases of the circulatory system 04/02/2014    History of hypertension    Personal history of other diseases of the female genital tract 08/14/2020    History of postmenopausal bleeding    Personal history of other diseases of the female genital tract 01/18/2022    History of vaginitis    Personal history of other diseases of the musculoskeletal system and connective tissue 04/02/2014    Personal history of scoliosis    Personal history of other specified conditions 06/11/2014    History of lump of right breast    Personal history of other specified conditions 09/01/2020    History of fatigue    PONV (postoperative nausea and vomiting) 1980    Unspecified hydronephrosis 09/01/2020    Hydronephrosis  of right kidney       Provider assessment  Diagnosis  Medication Reviewed - yes  Prior to Admission medications    Medication Sig Start Date End Date Taking? Authorizing Provider   amitriptyline (Elavil) 10 mg tablet Take 1 tablet (10 mg) by mouth once daily at bedtime. 9/9/24 9/9/25 Yes [place] DO   biotin 5,000 mcg tablet,disintegrating Take 1 tablet daily.   Yes Historical Provider, MD   calcium carbonate (Oscal) 500 mg calcium (1,250 mg) tablet Take 1 tablet (1,250 mg) by mouth once daily.   Yes Historical Provider, MD   cholecalciferol (Vitamin D-3) 50 mcg (2,000 unit) capsule Take 1 capsule daily   Yes Historical Provider, MD   cranberry 500 mg capsule Take 1 capsule by mouth once daily.   Yes Historical Provider, MD   docusate sodium (Colace) 100 mg capsule Take 1 capsule (100 mg) by mouth once daily.   Yes Historical Provider, MD   estradiol (Estrace) 0.01 % (0.1 mg/gram) vaginal cream Insert 0.25 Applicatorfuls (1 g) into the vagina 2 times a week. 7/4/24  Yes Silvia Camacho APRN-CNP   glucosamine-chondroitin (Osteo Bi-Flex) 250-200 mg tablet Take 1 tablet by mouth 2 times a day.   Yes Historical Provider, MD   hydroxychloroquine (Plaquenil) 200 mg tablet Take 1 tablet (200 mg) by mouth once daily. 9/9/24 3/8/25 Yes Laila Alonso MD   leflunomide (Arava) 20 mg tablet Take 1 tablet (20 mg) by mouth once daily. 9/9/24 9/4/25 Yes Laila Alonso MD   lisinopril 10 mg tablet Take 1 tablet (10 mg) by mouth once daily. 9/9/24 9/9/25 Yes [place] DO   magnesium glycinate 100 mg tablet Take 1 tablet by mouth once daily.   Yes Historical Provider, MD   metoprolol succinate XL (Toprol-XL) 50 mg 24 hr tablet Take 1 tablet (50 mg) by mouth once daily. Do not crush or chew.- has been taking 50 mg daily 9/9/24 9/9/25 Yes [place] DO   multivitamin with minerals tablet Take 0.5 tablets by mouth once daily.   Yes Historical Provider, MD   omeprazole (PriLOSEC) 40 mg DR capsule Take 1  capsule (40 mg) by mouth once daily in the morning. Take before meals. Do not crush or chew. 12/2/24 12/2/25 Yes TRENTON Haji-CNP   turmeric root extract 500 mg capsule Take 1 capsule by mouth 2 times a day.   Yes Historical Provider, MD   zoledronic acid (Reclast) 5 mg/100 mL piggyback Infuse 100 mL (5 mg) at 400 mL/hr over 15 minutes into a venous catheter yearly.  Patient not taking: Reported on 1/13/2025 10/18/24   Laila Alonso MD       This is my H&P    Physical Exam  Physical Exam  Constitutional:       Comments: Awake   HENT:      Head: Normocephalic.   Cardiovascular:      Rate and Rhythm: Normal rate and regular rhythm.   Pulmonary:      Effort: Pulmonary effort is normal.      Breath sounds: Normal breath sounds.   Abdominal:      General: Bowel sounds are normal.      Palpations: Abdomen is soft.   Neurological:      Mental Status: She is alert.   Psychiatric:         Mood and Affect: Mood normal.           Oropharyngeal Classification II (hard and soft palate, upper portion of tonsils and uvula visible)  ASA PS Classification 3  Sedation Plan Deep  Procedure Plan - pre-procedural (re)assesment completed by physician:  discharge/transfer patient when discharge criteria met    Sera Ng MD  1/13/2025 9:07 AM

## 2025-01-13 NOTE — ANESTHESIA POSTPROCEDURE EVALUATION
Patient: Danielle Deshpande    Procedure Summary       Date: 01/13/25 [place] [facility] [place]    Anesthesia Start: 0903 Anesthesia Stop: 0930    Procedure: EGD Diagnosis:       Dysphagia, unspecified type      Gastroesophageal reflux disease, unspecified whether esophagitis present    Scheduled Providers: Sera VINSON MD Responsible Provider: Roma Fairbanks MD    Anesthesia Type: general, MAC ASA Status: 3            Anesthesia Type: general, MAC    Vitals Value Taken Time   /589 01/13/25 0930   Temp 36.2 01/13/25 0930   Pulse 88 01/13/25 0930   Resp 16 01/13/25 0930   SpO2 96 01/13/25 0930       Anesthesia Post Evaluation    Patient location during evaluation: PACU  Patient participation: complete - patient participated  Level of consciousness: awake  Pain management: adequate  Airway patency: patent  Cardiovascular status: acceptable  Respiratory status: acceptable and room air  Hydration status: acceptable  Postoperative Nausea and Vomiting: none      No notable events documented.

## 2025-01-13 NOTE — ANESTHESIA PREPROCEDURE EVALUATION
Patient: Danielle Deshpande    Procedure Information       Date/Time: 01/13/25 0840    Scheduled providers: Sera VINSON MD    Procedure: EGD    Location: [facility]            Relevant Problems   Cardiac   (+) Benign essential hypertension   (+) Mitral regurgitation   (+) Tricuspid regurgitation   (+) Felton-Parkinson-White (WPW) syndrome      Neuro   (+) Lumbosacral radiculopathy at L5      /Renal   (+) Nephrolithiasis      Musculoskeletal   (+) Kyphoscoliosis   (+) Rheumatoid arthritis   (+) Spinal stenosis      HEENT   (+) Sensorineural hearing loss, bilateral       Clinical information reviewed:   Tobacco  Allergies  Meds  Problems  Med Hx  Surg Hx  OB Status    Fam Hx  Soc Hx        NPO Detail:  NPO/Void Status  Carbohydrate Drink Given Prior to Surgery? : N  Date of Last Liquid: 01/12/25  Time of Last Liquid: 2330  Date of Last Solid: 01/12/25  Time of Last Solid: 2000  Last Intake Type: Clear fluids  Time of Last Void: 0715         Physical Exam    Airway  Mallampati: II  TM distance: >3 FB  Neck ROM: full     Cardiovascular - normal exam  Rhythm: regular  Rate: normal     Dental - normal exam     Pulmonary - normal exam  Breath sounds clear to auscultation     Abdominal - normal exam  Abdomen: soft  Bowel sounds: normal           Anesthesia Plan    History of general anesthesia?: yes  History of complications of general anesthesia?: no    ASA 3     general and MAC   (MAC or TIVA)  The patient is not a current smoker.  Patient was previously instructed to abstain from smoking on day of procedure.  Patient did not smoke on day of procedure.  Education provided regarding risk of obstructive sleep apnea.  intravenous induction   Anesthetic plan and risks discussed with patient and spouse.  Use of blood products discussed with patient and spouse who consented to blood products.    Plan discussed with CRNA.

## 2025-01-27 ENCOUNTER — TELEPHONE (OUTPATIENT)
Dept: GASTROENTEROLOGY | Facility: CLINIC | Age: 70
End: 2025-01-27

## 2025-01-27 ENCOUNTER — APPOINTMENT (OUTPATIENT)
Dept: PRIMARY CARE | Facility: CLINIC | Age: 70
End: 2025-01-27
Payer: COMMERCIAL

## 2025-01-27 NOTE — TELEPHONE ENCOUNTER
I left a message to call [phone] to reschedule appointment for 2/17/25. follow up/post terrad     JOSE L Silva will not be in office.  I cancelled the appointment.

## 2025-02-06 ENCOUNTER — APPOINTMENT (OUTPATIENT)
Dept: PRIMARY CARE | Facility: CLINIC | Age: 70
End: 2025-02-06
Payer: COMMERCIAL

## 2025-02-06 VITALS
BODY MASS INDEX: 23.36 KG/M2 | HEART RATE: 111 BPM | RESPIRATION RATE: 16 BRPM | DIASTOLIC BLOOD PRESSURE: 98 MMHG | SYSTOLIC BLOOD PRESSURE: 158 MMHG | HEIGHT: 58 IN | OXYGEN SATURATION: 99 % | TEMPERATURE: 98.4 F | WEIGHT: 111.3 LBS

## 2025-02-06 DIAGNOSIS — I05.1 RHEUMATIC MITRAL REGURGITATION: ICD-10-CM

## 2025-02-06 DIAGNOSIS — I45.6 WOLFF-PARKINSON-WHITE (WPW) SYNDROME: ICD-10-CM

## 2025-02-06 DIAGNOSIS — I10 BENIGN ESSENTIAL HYPERTENSION: Primary | ICD-10-CM

## 2025-02-06 PROCEDURE — 1036F TOBACCO NON-USER: CPT | Performed by: INTERNAL MEDICINE

## 2025-02-06 PROCEDURE — 3077F SYST BP >= 140 MM HG: CPT | Performed by: INTERNAL MEDICINE

## 2025-02-06 PROCEDURE — 1160F RVW MEDS BY RX/DR IN RCRD: CPT | Performed by: INTERNAL MEDICINE

## 2025-02-06 PROCEDURE — 99212 OFFICE O/P EST SF 10 MIN: CPT | Performed by: INTERNAL MEDICINE

## 2025-02-06 PROCEDURE — 3008F BODY MASS INDEX DOCD: CPT | Performed by: INTERNAL MEDICINE

## 2025-02-06 PROCEDURE — 1159F MED LIST DOCD IN RCRD: CPT | Performed by: INTERNAL MEDICINE

## 2025-02-06 PROCEDURE — 3080F DIAST BP >= 90 MM HG: CPT | Performed by: INTERNAL MEDICINE

## 2025-02-06 PROCEDURE — 1123F ACP DISCUSS/DSCN MKR DOCD: CPT | Performed by: INTERNAL MEDICINE

## 2025-02-06 RX ORDER — LISINOPRIL 40 MG/1
40 TABLET ORAL DAILY
Qty: 90 TABLET | Refills: 3 | Status: SHIPPED | OUTPATIENT
Start: 2025-02-06 | End: 2026-02-06

## 2025-02-06 RX ORDER — LISINOPRIL 10 MG/1
40 TABLET ORAL DAILY
Qty: 360 TABLET | Refills: 3 | Status: SHIPPED
Start: 2025-02-06 | End: 2025-02-06

## 2025-02-06 ASSESSMENT — ENCOUNTER SYMPTOMS
FEVER: 0
PALPITATIONS: 0
SORE THROAT: 0
HYPERTENSION: 1
TROUBLE SWALLOWING: 0
ABDOMINAL PAIN: 0
CHILLS: 0
SHORTNESS OF BREATH: 0

## 2025-02-06 NOTE — PROGRESS NOTES
"Subjective   Danielle Deshpande is a 70 y.o. female who presents for Follow-up and Hypertension.      Patient was recently diagnosised with scoliosis   Patient does not check blood pressure at home often     Hypertension  This is a recurrent problem. The current episode started more than 1 year ago. The problem has been gradually improving since onset. The problem is controlled. Pertinent negatives include no chest pain, palpitations or shortness of breath. There are no associated agents to hypertension. Past treatments include ACE inhibitors. The current treatment provides moderate improvement. There are no compliance problems.        Review of Systems   Constitutional:  Negative for chills and fever.   HENT:  Negative for sore throat and trouble swallowing.    Respiratory:  Negative for shortness of breath.    Cardiovascular:  Negative for chest pain, palpitations and leg swelling.   Gastrointestinal:  Negative for abdominal pain.   Skin:  Negative for rash.   All other systems reviewed and are negative.      Objective   BP (!) 158/98   Pulse (!) 111   Temp 36.9 °C (98.4 °F)   Resp 16   Ht 1.473 m (4' 10\")   Wt 50.5 kg (111 lb 4.8 oz)   SpO2 99%   BMI 23.26 kg/m²       Physical Exam  Constitutional:       Appearance: Normal appearance.   HENT:      Head: Normocephalic.   Pulmonary:      Effort: Pulmonary effort is normal.   Musculoskeletal:      Cervical back: Neck supple.   Skin:     General: Skin is warm and dry.   Psychiatric:         Mood and Affect: Mood normal.         Assessment & Plan  Benign essential hypertension  Persistently elevated BP and will incresase to 40 mg of lisinopril.    Orders:    lisinopril 40 mg tablet; Take 1 tablet (40 mg) by mouth once daily.    Felton-Parkinson-White (WPW) syndrome  Will follow up with EP and referral made        Rheumatic mitral regurgitation    Orders:    lisinopril 40 mg tablet; Take 1 tablet (40 mg) by mouth once daily.         [place], DO   "

## 2025-02-07 NOTE — ASSESSMENT & PLAN NOTE
Persistently elevated BP and will incresase to 40 mg of lisinopril.    Orders:    lisinopril 40 mg tablet; Take 1 tablet (40 mg) by mouth once daily.

## 2025-02-10 ENCOUNTER — APPOINTMENT (OUTPATIENT)
Dept: GASTROENTEROLOGY | Facility: CLINIC | Age: 70
End: 2025-02-10
Payer: COMMERCIAL

## 2025-02-11 ENCOUNTER — TELEPHONE (OUTPATIENT)
Dept: PRIMARY CARE | Facility: CLINIC | Age: 70
End: 2025-02-11
Payer: COMMERCIAL

## 2025-02-11 NOTE — TELEPHONE ENCOUNTER
Patient stated her lisinopril dosage was changed from 10mg to 40mg.    She plans to start new change this evening.  (She didn't earlier because her  had a surgery and she wanted to be sure she was okay for that)      Patient is concerned about the large dosage change.  She would like to know if a gradual change would be okay.  To start the increase with just 20mg then gradually go up to the 40.    Please call her with guidance.

## 2025-02-14 NOTE — PROGRESS NOTES
Subjective   Patient ID: Danielle Deshpande is a 70 y.o. female who presents for Results (Review procedure results).  HPI  LOV 12/2/24:  Gastroesophageal reflux disease, unspecified whether esophagitis present  Acid suppression medication    - Advised on the correct dose and timing of the PPI, Preferably 1/2 hour before breakfast.   - Anti-reflux lifestyle modification discussed at length              --Avoid spicy acidic based foods              --Limit coffee, tea              --Limit the amount of food during meal time, avoid gorging              --Avoid bedtime snacks and eat meals 3 to 4 hrs before lying down              --Elevate the head of the bed with blocks  - EGD requested to assess/exclude for esophagitis/hiatal hernia. (Procedure risks and instruction discussed).  -     omeprazole (PriLOSEC) 40 mg DR capsule; Take 1 capsule (40 mg) by mouth once daily in the morning. Take before meals. Do not crush or chew.  Dysphagia, unspecified type  Dysphagia to solids only.  Was seen in the ED in September, 2024 with possible food bolus that spontaneously resolved.  EGD next.  Need to rule out Schatzki ring, esophagitis, GE junction mass, dysmotility?  -     Esophagogastroduodenoscopy (EGD); Future  ->Colonoscopy 2023:  Scattered diverticulosis of moderate severity causing mild luminal narrowing in the descending colon and sigmoid colon  Hemorrhoids  Subcentimeter polyp in the sigmoid colon was removed with cold snare  ->EGD 1/13/25:Scheduled for repeat EGD in March, 2025  The esophagus appeared normal. Performed random biopsy to rule out eosinophilic esophagitis.  2 cm type I hiatal hernia  Single ulcer in the antrum with clean base (Matt III); performed cold forceps biopsy  Edematous, erythematous mucosa with erosion in the antrum; performed cold forceps biopsy to rule out H. pylori  Edematous, erythematous mucosa with erosion in the duodenal bulb  Diverticulosis in the 2nd part of the duodenum    [facility] follow-up  2/19/2025:  Patient following up after undergoing an upper endoscopy in January, 2025.  She is taking PPI twice daily and is no longer experiencing dysphagia.  Stomach burning has resolved.  However, does experience upper abdominal pain from time to time.  No melena, hematochezia or hematemesis.  Weight and appetite are stable.  She endorses chronic constipation, has history of rectal and bladder prolapse.  Has a pessary in place.  She uses Colace at night as needed with good response.  However, does not drink enough water throughout the day due to her work schedule.  Consumes a low fiber diet.    Review of Systems   All other systems reviewed and are negative.      Objective   Physical Exam  Vitals reviewed.   Constitutional:       General: She is awake. She is not in acute distress.     Appearance: Normal appearance. She is well-developed and normal weight. She is not ill-appearing, toxic-appearing or diaphoretic.   HENT:      Head: Normocephalic and atraumatic.   Eyes:      Pupils: Pupils are equal, round, and reactive to light.   Neck:      Thyroid: No thyroid mass.      Trachea: Phonation normal.   Pulmonary:      Effort: Pulmonary effort is normal. No respiratory distress.      Breath sounds: Normal air entry. No decreased breath sounds.   Abdominal:      General: Bowel sounds are normal. There is no distension.      Palpations: Abdomen is soft.      Tenderness: There is no abdominal tenderness.   Musculoskeletal:      Cervical back: Neck supple.      Right lower leg: No edema.      Left lower leg: No edema.   Skin:     General: Skin is warm.      Capillary Refill: Capillary refill takes less than 2 seconds.      Coloration: Skin is not cyanotic, jaundiced or pale.   Neurological:      General: No focal deficit present.      Mental Status: She is alert and oriented to person, place, and time.      Cranial Nerves: Cranial nerves 2-12 are intact.      Motor: Motor function is intact. No weakness.      Gait: Gait  normal.   Psychiatric:         Attention and Perception: Attention and perception normal.         Mood and Affect: Mood normal.         Speech: Speech normal.         Behavior: Behavior normal. Behavior is cooperative.         Assessment/Plan   Diagnoses and all orders for this visit:  PUD (peptic ulcer disease)  70-year-old female status post EGD in January, 2025 with findings of an antral ulcer and duodenitis.  Biopsies negative for H. pylori.  Taking PPI twice daily with good response to GERD symptoms.  Dysphagia has completely resolved with PPI, likely secondary to spasms from GERD.  Patient will continue empiric dosing of PPI until her EGD is repeated to check for healing.  Peptic ulcer disease likely secondary to Fosamax.  Gastroesophageal reflux disease without esophagitis  -     omeprazole (PriLOSEC) 40 mg DR capsule; Take 1 capsule (40 mg) by mouth 2 times a day before meals. Do not crush or chew.  Duodenitis  Chronic idiopathic constipation  Patient with chronic constipation, likely with pelvic floor dysfunction.  Colonoscopy is up-to-date and no red flag symptoms noted.  Recommend a bowel regimen, detailed instruction provided to the patient.  She will follow-up with me in 3 months to assess her response.  At that time if she continues to experience constipation we can escalate treatment to Linzess or Trulance and I will recommend pelvic floor therapy.         TRENTON Haji-CNP 02/19/25 8:28 AM

## 2025-02-17 ENCOUNTER — APPOINTMENT (OUTPATIENT)
Dept: GASTROENTEROLOGY | Facility: CLINIC | Age: 70
End: 2025-02-17
Payer: COMMERCIAL

## 2025-02-17 ENCOUNTER — OFFICE VISIT (OUTPATIENT)
Dept: ORTHOPEDIC SURGERY | Facility: CLINIC | Age: 70
End: 2025-02-17
Payer: COMMERCIAL

## 2025-02-17 ENCOUNTER — HOSPITAL ENCOUNTER (OUTPATIENT)
Dept: RADIOLOGY | Facility: EXTERNAL LOCATION | Age: 70
Discharge: HOME | End: 2025-02-17

## 2025-02-17 ENCOUNTER — HOSPITAL ENCOUNTER (OUTPATIENT)
Dept: RADIOLOGY | Facility: CLINIC | Age: 70
Discharge: HOME | End: 2025-02-17
Payer: COMMERCIAL

## 2025-02-17 DIAGNOSIS — M25.562 ACUTE PAIN OF LEFT KNEE: ICD-10-CM

## 2025-02-17 DIAGNOSIS — M17.12 PRIMARY OSTEOARTHRITIS OF LEFT KNEE: Primary | ICD-10-CM

## 2025-02-17 PROCEDURE — 20611 DRAIN/INJ JOINT/BURSA W/US: CPT | Mod: LT | Performed by: INTERNAL MEDICINE

## 2025-02-17 PROCEDURE — 73562 X-RAY EXAM OF KNEE 3: CPT | Mod: LT

## 2025-02-17 PROCEDURE — 99213 OFFICE O/P EST LOW 20 MIN: CPT | Mod: 25 | Performed by: INTERNAL MEDICINE

## 2025-02-17 PROCEDURE — 1123F ACP DISCUSS/DSCN MKR DOCD: CPT | Performed by: INTERNAL MEDICINE

## 2025-02-17 PROCEDURE — 99203 OFFICE O/P NEW LOW 30 MIN: CPT | Performed by: INTERNAL MEDICINE

## 2025-02-17 PROCEDURE — 73562 X-RAY EXAM OF KNEE 3: CPT | Mod: LEFT SIDE | Performed by: INTERNAL MEDICINE

## 2025-02-17 PROCEDURE — 2500000004 HC RX 250 GENERAL PHARMACY W/ HCPCS (ALT 636 FOR OP/ED): Performed by: INTERNAL MEDICINE

## 2025-02-17 RX ORDER — LIDOCAINE HYDROCHLORIDE 10 MG/ML
6 INJECTION, SOLUTION INFILTRATION; PERINEURAL
Status: COMPLETED | OUTPATIENT
Start: 2025-02-17 | End: 2025-02-17

## 2025-02-17 RX ORDER — BETAMETHASONE SODIUM PHOSPHATE AND BETAMETHASONE ACETATE 3; 3 MG/ML; MG/ML
3 INJECTION, SUSPENSION INTRA-ARTICULAR; INTRALESIONAL; INTRAMUSCULAR; SOFT TISSUE
Status: COMPLETED | OUTPATIENT
Start: 2025-02-17 | End: 2025-02-17

## 2025-02-17 RX ADMIN — BETAMETHASONE SODIUM PHOSPHATE AND BETAMETHASONE ACETATE 3 ML: 3; 3 INJECTION, SUSPENSION INTRA-ARTICULAR; INTRALESIONAL; INTRAMUSCULAR at 16:39

## 2025-02-17 RX ADMIN — LIDOCAINE HYDROCHLORIDE 6 ML: 10 INJECTION, SOLUTION INFILTRATION; PERINEURAL at 16:39

## 2025-02-17 NOTE — PROGRESS NOTES
Acute Injury New Patient Visit    CC:   Chief Complaint   Patient presents with    Left Knee - Pain       HPI: Danielle is a 70 y.o. female presents today for evaluation for left acute knee pain which started two weeks ago. No trauma or fall. She states that she has had both viscosupplementation and corticosteroid injections in the past. She is here for initial evaluation and x-rays. She as RA and scoliosis.         Review of Systems   GENERAL: Negative for malaise, significant weight loss, fever  MUSCULOSKELETAL: See HPI  NEURO:  Negative for numbness / tingling     Past Medical History  Past Medical History:   Diagnosis Date    Acute recurrent sinusitis, unspecified 02/04/2022    Acute recurrent sinusitis, unspecified location    Arthritis 2010    Colon polyp 1986    Heart disease     WPW    Hypertension 1993    Other signs and symptoms in breast 06/11/2014    Inversion of nipple    Personal history of other diseases of the circulatory system 04/02/2014    History of hypertension    Personal history of other diseases of the female genital tract 08/14/2020    History of postmenopausal bleeding    Personal history of other diseases of the female genital tract 01/18/2022    History of vaginitis    Personal history of other diseases of the musculoskeletal system and connective tissue 04/02/2014    Personal history of scoliosis    Personal history of other specified conditions 06/11/2014    History of lump of right breast    Personal history of other specified conditions 09/01/2020    History of fatigue    PONV (postoperative nausea and vomiting) 1980    Unspecified hydronephrosis 09/01/2020    Hydronephrosis of right kidney       Medication review  Medication Documentation Review Audit       Reviewed by West Pizano DO (Physician) on 02/06/25 at 1556      Medication Order Taking? Sig Documenting Provider Last Dose Status   amitriptyline (Elavil) 10 mg tablet [id number] Yes Take 1 tablet (10 mg) by mouth once  daily at bedtime. West Pizano,  1/12/2025 Bedtime Active   biotin 5,000 mcg tablet,disintegrating [id number] Yes Take 1 tablet daily. Historical Provider, MD 1/12/2025 Active   calcium carbonate (Oscal) 500 mg calcium (1,250 mg) tablet [id number] Yes Take 1 tablet (1,250 mg) by mouth once daily. Historical Provider, MD 1/12/2025 Active   cholecalciferol (Vitamin D-3) 50 mcg (2,000 unit) capsule [id number] Yes Take 1 capsule daily Historical Provider, MD 1/12/2025 Active   cranberry 500 mg capsule [id number] Yes Take 1 capsule by mouth once daily. Historical Provider, MD Past Week Active   docusate sodium (Colace) 100 mg capsule [id number] Yes Take 1 capsule (100 mg) by mouth once daily. Historical Provider, MD 1/12/2025 Active   estradiol (Estrace) 0.01 % (0.1 mg/gram) vaginal cream [id number] Yes Insert 0.25 Applicatorfuls (1 g) into the vagina 2 times a week. Silvia Camacho, APRN-CNP Past Week Active   glucosamine-chondroitin (Osteo Bi-Flex) 250-200 mg tablet [id number] Yes Take 1 tablet by mouth 2 times a day. Historical Provider, MD Past Week Active   hydroxychloroquine (Plaquenil) 200 mg tablet [id number] Yes Take 1 tablet (200 mg) by mouth once daily. Laila Alonso MD 1/12/2025 Active   leflunomide (Arava) 20 mg tablet [id number] Yes Take 1 tablet (20 mg) by mouth once daily. Laila Alonso MD 1/12/2025 Active   lisinopril 10 mg tablet [id number] Yes Take 1 tablet (10 mg) by mouth once daily. West Pizano DO 1/12/2025 Active   magnesium glycinate 100 mg tablet [id number] Yes Take 1 tablet by mouth once daily. Britt Moss MD 1/12/2025 Active   metoprolol succinate XL (Toprol-XL) 50 mg 24 hr tablet [id number] Yes Take 1 tablet (50 mg) by mouth once daily. Do not crush or chew.- has been taking 50 mg daily West Pizano DO 1/12/2025 Active   multivitamin with minerals tablet [id number] Yes Take 0.5 tablets by mouth once daily. Historical Provider, MD Past Week Active   omeprazole (PriLOSEC) 40 mg   capsule [id number] Yes Take 1 capsule (40 mg) by mouth 2 times a day before meals. Do not crush or chew. Sera VINSON MD  Active   turmeric root extract 500 mg capsule [id number] Yes Take 1 capsule by mouth 2 times a day. Historical Provider, MD Past Week Active   zoledronic acid (Reclast) 5 mg/100 mL piggyback [id number] Yes Infuse 100 mL (5 mg) at 400 mL/hr over 15 minutes into a venous catheter yearly. Laila Alonso MD Not Taking Active                    Allergies  Allergies   Allergen Reactions    Diphenhydramine Hcl Other     Confusion     Levaquin [Levofloxacin] Other     Confusion    Novocain [Procaine] Other     Palpatations       Social History  Social History     Socioeconomic History    Marital status:      Spouse name: Not on file    Number of children: Not on file    Years of education: Not on file    Highest education level: Not on file   Occupational History    Not on file   Tobacco Use    Smoking status: Never    Smokeless tobacco: Never   Vaping Use    Vaping status: Never Used   Substance and Sexual Activity    Alcohol use: Not Currently    Drug use: Never    Sexual activity: Not Currently     Partners: Male     Birth control/protection: Post-menopausal   Other Topics Concern    Not on file   Social History Narrative    Not on file     Social Drivers of Health     Financial Resource Strain: Low Risk  (4/13/2024)    Overall Financial Resource Strain (CARDIA)     Difficulty of Paying Living Expenses: Not very hard   Food Insecurity: Not on file   Transportation Needs: No Transportation Needs (4/13/2024)    PRAPARE - Transportation     Lack of Transportation (Medical): No     Lack of Transportation (Non-Medical): No   Physical Activity: Not on file   Stress: Not on file   Social Connections: Not on file   Intimate Partner Violence: Not on file   Housing Stability: Low Risk  (4/13/2024)    Housing Stability Vital Sign     Unable to Pay for Housing in the Last Year: No     Number of Places  Lived in the Last Year: 1     Unstable Housing in the Last Year: No       Surgical History  Past Surgical History:   Procedure Laterality Date    BACK SURGERY  2014    Back Surgery    BREAST BIOPSY  2014    Biopsy Breast Open     SECTION, LOW TRANSVERSE  1991    COLONOSCOPY  2014    Colonoscopy (Fiberoptic)    OTHER SURGICAL HISTORY  2022    Percutaneous nephrolithotomy    OTHER SURGICAL HISTORY  2022    Renal lithotripsy       Physical Exam:  GENERAL:  Patient is awake, alert, and oriented to person place and time.  Patient appears well nourished and well kept.  Affect Calm, Not Acutely Distressed.  HEENT:  Normocephalic, Atraumatic, EOMI  CARDIOVASCULAR:  Hemodynamically stable.  RESPIRATORY:  Normal respirations with unlabored breathing.  Extremity: Left knee examination shows skin is intact.  There is no erythema or warmth.  Trace to 1+ effusion.  Can flex the right knee to 130 degrees.  Full extension at 0 degrees.  Pain over the medial joint line.  Pain over the lateral joint line.  There is no pain over the patellar or quadricep tendon.  No pain over the proximal tibia.  No pain over the popliteal fossa.  Negative valgus stress test.  Negative varus stress test.  Negative Wolfgang's test medially with no instability.  Negative Wolfgang's test laterally with no instability.  Negative Lachman's test.  Patellar and quadricep mechanism intact.  Negative anterior and posterior drawer test.  Negative patellar apprehension test.  Distal pulses are palpable, neurovascularly intact.  Walking with no significant antalgic gait.      Diagnostics: X-rays reviewed      Procedure: L Inj/Asp: L knee on 2025 4:39 PM  Indications: pain  Details: 22 G needle, ultrasound-guided lateral approach  Medications: 3 mL betamethasone acet,sod phos 6 mg/mL; 6 mL lidocaine 10 mg/mL (1 %)  Outcome: tolerated well, no immediate complications  Procedure, treatment alternatives, risks and benefits  explained, specific risks discussed. Consent was given by the patient. Immediately prior to procedure a time out was called to verify the correct patient, procedure, equipment, support staff and site/side marked as required. Patient was prepped and draped in the usual sterile fashion.             Assessment: Left knee osteoarthritis    Plan: Danielle presents today for evaluation for acute left knee pain which started two weeks ago. No trauma or fall. X-rays showed no obvious fractures, with advanced arthritic changes.  Had previous gel injections with minimal relief.  We did offer an ultrasound-guided corticosteroid injection to the left knee, risk and benefits of the procedure were discussed.  She felt relief after the injection.  She would like to follow-up with 1 my [facility] at the [facility], and to discuss potential knee replacement options pending on outcome of the injection.      Orders Placed This Encounter    XR knee left 3 views      At the conclusion of the visit there were no further questions by the patient/family regarding their plan of care.  Patient was instructed to call or return with any issues, questions, or concerns regarding their injury and/or treatment plan described above.     02/17/25 at 1:02 PM - Kennedy Ribeiro MD  Scribe Attestation  By signing my name below, I, Lincoln Mcgraw, Scribe   attest that this documentation has been prepared under the direction and in the presence of Kennedy Ribeiro MD.    Office: [phone]    This note was prepared using voice recognition software.  The details of this note are correct and have been reviewed, and corrected to the best of my ability.  Some grammatical errors may persist related to the Dragon software.

## 2025-02-18 ENCOUNTER — APPOINTMENT (OUTPATIENT)
Dept: GASTROENTEROLOGY | Facility: CLINIC | Age: 70
End: 2025-02-18
Payer: COMMERCIAL

## 2025-02-19 ENCOUNTER — APPOINTMENT (OUTPATIENT)
Dept: GASTROENTEROLOGY | Facility: CLINIC | Age: 70
End: 2025-02-19
Payer: COMMERCIAL

## 2025-02-19 VITALS
HEART RATE: 72 BPM | WEIGHT: 111.3 LBS | HEIGHT: 58 IN | OXYGEN SATURATION: 98 % | SYSTOLIC BLOOD PRESSURE: 136 MMHG | BODY MASS INDEX: 23.36 KG/M2 | DIASTOLIC BLOOD PRESSURE: 80 MMHG | RESPIRATION RATE: 16 BRPM

## 2025-02-19 DIAGNOSIS — K21.9 GASTROESOPHAGEAL REFLUX DISEASE WITHOUT ESOPHAGITIS: ICD-10-CM

## 2025-02-19 DIAGNOSIS — K27.9 PUD (PEPTIC ULCER DISEASE): Primary | ICD-10-CM

## 2025-02-19 DIAGNOSIS — K29.80 DUODENITIS: ICD-10-CM

## 2025-02-19 DIAGNOSIS — K59.04 CHRONIC IDIOPATHIC CONSTIPATION: ICD-10-CM

## 2025-02-19 PROCEDURE — 1123F ACP DISCUSS/DSCN MKR DOCD: CPT | Performed by: NURSE PRACTITIONER

## 2025-02-19 PROCEDURE — 99213 OFFICE O/P EST LOW 20 MIN: CPT | Performed by: NURSE PRACTITIONER

## 2025-02-19 PROCEDURE — 1159F MED LIST DOCD IN RCRD: CPT | Performed by: NURSE PRACTITIONER

## 2025-02-19 PROCEDURE — 3008F BODY MASS INDEX DOCD: CPT | Performed by: NURSE PRACTITIONER

## 2025-02-19 PROCEDURE — 1036F TOBACCO NON-USER: CPT | Performed by: NURSE PRACTITIONER

## 2025-02-19 PROCEDURE — 3075F SYST BP GE 130 - 139MM HG: CPT | Performed by: NURSE PRACTITIONER

## 2025-02-19 PROCEDURE — 3079F DIAST BP 80-89 MM HG: CPT | Performed by: NURSE PRACTITIONER

## 2025-02-19 RX ORDER — OMEPRAZOLE 40 MG/1
40 CAPSULE, DELAYED RELEASE ORAL
Qty: 60 CAPSULE | Refills: 1 | Status: SHIPPED | OUTPATIENT
Start: 2025-02-19 | End: 2025-03-21

## 2025-02-19 NOTE — PATIENT INSTRUCTIONS
-Continue omeprazole until your repeat EGD.  -Start Metamucil sugar free 1 tsp daily in 6 oz of water  -Colace 1-2 capsules nightly  -Try and drink 64 oz of water daily  -Squatty potty with bowel movements   -See me in 3 months

## 2025-02-20 ENCOUNTER — APPOINTMENT (OUTPATIENT)
Dept: PRIMARY CARE | Facility: CLINIC | Age: 70
End: 2025-02-20
Payer: COMMERCIAL

## 2025-02-20 VITALS — SYSTOLIC BLOOD PRESSURE: 152 MMHG | HEART RATE: 88 BPM | DIASTOLIC BLOOD PRESSURE: 78 MMHG

## 2025-02-20 DIAGNOSIS — I10 BENIGN ESSENTIAL HYPERTENSION: ICD-10-CM

## 2025-02-27 NOTE — PROGRESS NOTES
Subjective   Patient ID: [id number]   Danielle Deshpande is a 70 y.o. female known to have RA, OA and chondrocalcinosis of the knees, Osteopenia, vitamin D def, kyphoscoliosis, spinal stenosis, kidney stones, hx of hypercalcemia, migraines, SNHL, HTN, WPW, PAF, and BPPV, presents for follow up     Current rheum meds:  -  mg QD  - Leflunomide 10 mg daily  - Reclast     Previous IS:  - Fosamax 70 mg weekly started in 1/24, stopped in 10/24 having terrible GERD and chest pain     HPI   Did not receive Reclast yet, she did not schedule it   Saw ortho again, gel injections did not help, got a CSI of the left knee in 2/25  Saw neurosurgery Dr. Adamson for LL numbness, scoliosis -> referral for a spinal cord stimulator evaluation and injections. She will follow up for surgical considerations.   Seeing Dr. Rojas in [place] next week   Saw GI for dysphagia, instructed on GERD meds and lifestyle changes, ordered EGD  EGD -> antral ulcer and duodenitis. Biopsies negative for H. pylori. Taking PPI twice daily with good response to GERD symptoms. Dysphagia has completely resolved with PPI, likely secondary to spasms from GERD. Patient will continue empiric dosing of PPI until her EGD is repeated to check for healing. Peptic ulcer disease likely secondary to Fosamax  Saw cardio fo PWP acting up, can put her on meds if it becomes recurrent   Saw sports medicine Dr. Alfaro for bilateral knee OA, got injections 3/3 of the gel ones, and a brace   Seeing Dr. Reardon today since her regular ortho moved to another facility   Last eye exam w few months ago, no HCQ toxicity per the patient   Saw urology for cystocele prolapse   Doing exercises for her scoliosis and her rib started to bother her, it got better on its own  1 episode of swelling in her MCPs but without pain, lasted a few days  No current pains  No current swelling  Morning stiffness of a few minutes   Compliant with meds  No side effects reported  No episodes of eye  "inflammation  No sicca sx  No chest pain, cough or dyspnea  No infections    ROS:  As per HPI     Rheum hx:  Last eye exam in 9/23  She was seeing Dr. Umana for RA since around 2020, she just recently retired in 12/23  She had pain in her knees, hands (mostly in her MCPs) and feet and diagnosed as RA  No serology available  Patient has a history of kidney stones s/p nephrostomies   Reports currently pains in her knees, osteoarthritis \"bone on bone\", takes osteobiflex and that helps   Right foot pain, mid foot on the dorsal side, has not seen podiatry in a while   Joint pains are mechanical in nature  MS lasts around 1 hour, takes a shower   Swelling sometimes in her MCPs and feet   Feels like her symptoms and swelling improved with the medications     + Numbness in both legs   + occasional dry mouth   + photosensitivity on her arms   + nasal ulcers  + has problems with her     History of falls / fractures: yes fracture of the tibia and foot, without a cause   Loss of height: yes 3 inches   Tobacco: no  Alcohol: no  Vitamin D supplementation: yes  Calcium supplementation: yes  Weight bearing exercise: no  Previous or planned invasive jaw surgery: no  History of radiation: no  Chronic steroid use: no  History of RA: yes  GERD: yes  Emelyn-en-y: no  CKD / GFR <30: yes  Parental hip fracture: yes, father    PSH: Spinal fusion, C sections, nephrostomies, finger surgery   Allergies: As per list   Habits: No alcohol, no tobacco, no drugs  Social hx: , 5 kids, healthy, works as a    FHx: No family history of autoimmune diseases     Patient Active Problem List   Diagnosis    Atrophy of vagina    Baker cyst, right    Benign essential hypertension    Cystocele with uterine prolapse    Dental caries extending into dentin    Kyphoscoliosis    Migraine    Mitral regurgitation    Mixed incontinence urge and stress    Nephrolithiasis    Osteoporosis, post-menopausal    Rheumatoid arthritis    Spinal " stenosis    Tricuspid regurgitation    Vitamin D deficiency    Felton-Parkinson-White (WPW) syndrome    Personal history of colonic polyps    Sensorineural hearing loss, bilateral    Lumbosacral radiculopathy at L5    Gastroesophageal reflux disease without esophagitis    Duodenitis    Chronic idiopathic constipation    PUD (peptic ulcer disease)      Past Medical History:   Diagnosis Date    Acute recurrent sinusitis, unspecified 2022    Acute recurrent sinusitis, unspecified location    Arthritis 2010    Colon polyp 1986    Heart disease     WPW    Hypertension 1993    Other signs and symptoms in breast 2014    Inversion of nipple    Personal history of other diseases of the circulatory system 2014    History of hypertension    Personal history of other diseases of the female genital tract 2020    History of postmenopausal bleeding    Personal history of other diseases of the female genital tract 2022    History of vaginitis    Personal history of other diseases of the musculoskeletal system and connective tissue 2014    Personal history of scoliosis    Personal history of other specified conditions 2014    History of lump of right breast    Personal history of other specified conditions 2020    History of fatigue    PONV (postoperative nausea and vomiting) 1980    Unspecified hydronephrosis 2020    Hydronephrosis of right kidney     Past Surgical History:   Procedure Laterality Date    BACK SURGERY  2014    Back Surgery    BREAST BIOPSY  2014    Biopsy Breast Open     SECTION, LOW TRANSVERSE  1991    COLONOSCOPY  2014    Colonoscopy (Fiberoptic)    OTHER SURGICAL HISTORY  2022    Percutaneous nephrolithotomy    OTHER SURGICAL HISTORY  2022    Renal lithotripsy     Social History     Socioeconomic History    Marital status:      Spouse name: Not on file    Number of children: Not on file    Years of education: Not on  file    Highest education level: Not on file   Occupational History    Not on file   Tobacco Use    Smoking status: Never    Smokeless tobacco: Never   Vaping Use    Vaping status: Never Used   Substance and Sexual Activity    Alcohol use: Not Currently    Drug use: Never    Sexual activity: Not Currently     Partners: Male     Birth control/protection: Post-menopausal   Other Topics Concern    Not on file   Social History Narrative    Not on file     Social Drivers of Health     Financial Resource Strain: Low Risk  (4/13/2024)    Overall Financial Resource Strain (CARDIA)     Difficulty of Paying Living Expenses: Not very hard   Food Insecurity: Not on file   Transportation Needs: No Transportation Needs (4/13/2024)    PRAPARE - Transportation     Lack of Transportation (Medical): No     Lack of Transportation (Non-Medical): No   Physical Activity: Not on file   Stress: Not on file   Social Connections: Not on file   Intimate Partner Violence: Not on file   Housing Stability: Low Risk  (4/13/2024)    Housing Stability Vital Sign     Unable to Pay for Housing in the Last Year: No     Number of Places Lived in the Last Year: 1     Unstable Housing in the Last Year: No     Allergies   Allergen Reactions    Diphenhydramine Hcl Other     Confusion     Levaquin [Levofloxacin] Other     Confusion    Novocain [Procaine] Other     Palpatations      Current Outpatient Medications:     amitriptyline (Elavil) 10 mg tablet, Take 1 tablet (10 mg) by mouth once daily at bedtime., Disp: 90 tablet, Rfl: 3    biotin 5,000 mcg tablet,disintegrating, Take 1 tablet daily., Disp: , Rfl:     calcium carbonate (Oscal) 500 mg calcium (1,250 mg) tablet, Take 1 tablet (1,250 mg) by mouth once daily., Disp: , Rfl:     cholecalciferol (Vitamin D-3) 50 mcg (2,000 unit) capsule, Take 1 capsule daily, Disp: , Rfl:     cranberry 500 mg capsule, Take 1 capsule by mouth once daily., Disp: , Rfl:     docusate sodium (Colace) 100 mg capsule, Take 1  capsule (100 mg) by mouth once daily., Disp: , Rfl:     estradiol (Estrace) 0.01 % (0.1 mg/gram) vaginal cream, Insert 0.25 Applicatorfuls (1 g) into the vagina 2 times a week., Disp: 42.5 g, Rfl: 5    glucosamine-chondroitin (Osteo Bi-Flex) 250-200 mg tablet, Take 1 tablet by mouth 2 times a day., Disp: , Rfl:     leflunomide (Arava) 20 mg tablet, Take 1 tablet (20 mg) by mouth once daily., Disp: 180 tablet, Rfl: 1    lisinopril 40 mg tablet, Take 1 tablet (40 mg) by mouth once daily. (Patient taking differently: Take 0.5 tablets (20 mg) by mouth once daily.), Disp: 90 tablet, Rfl: 3    magnesium glycinate 100 mg tablet, Take 1 tablet by mouth once daily., Disp: , Rfl:     metoprolol succinate XL (Toprol-XL) 50 mg 24 hr tablet, Take 1 tablet (50 mg) by mouth once daily. Do not crush or chew.- has been taking 50 mg daily, Disp: 90 tablet, Rfl: 3    multivitamin with minerals tablet, Take 0.5 tablets by mouth once daily., Disp: , Rfl:     omeprazole (PriLOSEC) 40 mg DR capsule, Take 1 capsule (40 mg) by mouth 2 times a day before meals. Do not crush or chew., Disp: 60 capsule, Rfl: 1    turmeric root extract 500 mg capsule, Take 1 capsule by mouth 2 times a day., Disp: , Rfl:     zoledronic acid (Reclast) 5 mg/100 mL piggyback, Infuse 100 mL (5 mg) at 400 mL/hr over 15 minutes into a venous catheter yearly., Disp: 100 mL, Rfl: 0     Objective   Visit Vitals  /83   Pulse 82   Temp 36.4 °C (97.6 °F)   Resp 20   Wt 49 kg (108 lb)   BMI 22.57 kg/m²   OB Status Postmenopausal   Smoking Status Never   BSA 1.42 m²     Physical Exam:  General: AAOx3, Cooperative  Head: normocephalic, atraumatic, no hair loss   Eyes: EOMI, conjunctiva clear, sclera white, anicteric  Ears: hearing intact  Nose: no deformity, no crusting   Throat/Mouth: No oral deformities, no cheek swelling, mucosa appear moist, no oral ulcers noted. Has some teeth missing (recently extracted) denture on the bottom   Skin: No rashes   MSK: Upper  Extremities:  Hand/Fingers: Hypertrophy of bilateral 2nd-4th MCP. TTP of DIPs. No erythema, edema, tenderness or warmth at DIP, PIP, or MCP joints, FROM grossly. Good hand . Heberden's nodes bilaterally   Wrists: No erythema, edema, warmth or tenderness at wrist, FROM grossly  Elbows: No tenderness, edema, erythema or warmth at elbows, FROM grossly. No nodules   Shoulders: No edema, erythema, tenderness or warmth at shoulders. FROM  Lower Extremities:   Hips: No obvious deformities. No joint tenderness, normal ROM grossly. No trochanteric bursae TTP  Knees: No tenderness, deformities, edema, rashes, or warmth, normal ROM grossly. No crepitus, no pes anserine bursa TTP   Ankles, feet: Flexed toes. No tenderness, edema, erythema, ulceration, or warmth at the ankle or MTP/IP joints, normal ROM grossly  Spine: Significant scoliosis from the thoracic area down, to the right. No spinal tenderness to palpation. No SI joint tenderness     Lab Results   Component Value Date    WBC 4.0 (L) 09/05/2024    HGB 12.9 09/05/2024    HCT 40.2 09/05/2024    MCV 93 09/05/2024     09/05/2024        Chemistry    Lab Results   Component Value Date/Time     09/05/2024 1612    K 3.7 09/05/2024 1612     09/05/2024 1612    CO2 29 09/05/2024 1612    BUN 20 09/05/2024 1612    CREATININE 0.73 09/05/2024 1612    Lab Results   Component Value Date/Time    CALCIUM 9.7 09/05/2024 1612    ALKPHOS 50 09/05/2024 1612    AST 22 09/05/2024 1612    ALT 14 09/05/2024 1612    BILITOT 0.6 09/05/2024 1612        Lab Results   Component Value Date    NEUTROABS 2.12 09/05/2024     Lab Results   Component Value Date    HEPCAB Nonreactive 10/13/2023      Lab Results   Component Value Date    ALT 14 09/05/2024    AST 22 09/05/2024    ALKPHOS 50 09/05/2024    BILITOT 0.6 09/05/2024     Lab Results   Component Value Date    URICACID 4.3 05/14/2021      Lab Results   Component Value Date    PTH 45.5 02/29/2024    CALCIUM 9.7 09/05/2024    PHOS  3.3 02/29/2024     XR knee left 3 views  Interpreted By:  Kennedy Oconnor,   STUDY:  XR KNEE LEFT 3 VIEWS, 2/17/2025 1:07 pm      INDICATION:  Signs/Symptoms:pain      ACCESSION NUMBER(S):  [id number]      ORDERING CLINICIAN:  KENNEDY OCONNOR      FINDINGS:  Left knee x-rays three views AP, lateral and sunrise view: No acute  fractures, no dislocation. Advanced tricompartmental degenerative  joint disease, most pronounced at the medial compartment with  significant joint space narrowing and osteophyte formation.  Chondrocalcinosis of the lateral compartment.          Signed by: Kennedy Oconnor 2/17/2025 5:56 PM  Dictation workstation:   [id number]  Point of Care Ultrasound  These images are not reportable by radiology and will not be interpreted   by  Radiologists.     === 09/12/23 ===  XR ABDOMEN 1 VIEW  No definite change from CT 24 August 2022     === 10/24/18 ===  MRI CERVICAL SPINE WO CONTRAST  Mild multifocal degenerative changes as described above     === 10/31/23 ===  DEXA BONE DENSITY  LEFT FEMUR -TOTAL BMD: 0.754 T-Score -2.0   Bone Mineral Density change vs baseline: -11.3%  Bone Mineral Density change vs previous: -2.3%      LEFT FEMUR -NECK BMD: 0.798 T-Score -1.7     LEFT FOREARM Total BMD: 0.616 T-Score -1.0   UD Bone Mineral Density: 0.394 T-Score -1.5    1/3 BMD: 0.829 T-Score -0.5   Bone Mineral Density change vs baseline:  -1%     10-year Fracture Risk:  Major Osteoporotic Fracture: 13.2%  Hip Fracture: 2.3%      Assessment/Plan    This is a This is a 69 y.o. female, known to have seronegative RA, chondrocalcinosis of the hands, Osteopenia, vitamin D def, OA, kyphoscoliosis, spinal stenosis, presents for follow up  Last seen in 9/24     Patient with a prior diagnosis of seronegative RA. She also has some symptoms suggestive of CTD. RF, CCP, SANJIV neg. She has osteopenia with low FRAX but 2 fragility fractures without injury. Discussion with the patient about BP and explained risks and benefits.  Started on Fosamax     Labs:  3/25: WBCs 3.7K, , rest of CBC, CMP, ESR, CRP wnl  9/24: WBCs 4K, ALC 0.93, rest of CBC, CMP, ESR, CRP wnl  4/24: Prt 6.2, rest of CMP, CBC, ESR, CRP wnl  4/24: CBC, CMP, Mg, ProBNP, trop wnl  2/24: CRP, SPEP, Ph, PTH wnl  1/24: ALC 0.78, Prt 6.2, rest of CMP, CBC, ESR, C3, C4, Uprt, vitamin D wnl  RF, CCP, SANJIV wnl  10/23: CMP wnl. Hep C neg  8/23: CBC, CMP, TSH wnl     Imaging:  EMG LL: Chronic mild right L5 radiculopathy with no active denervation    Xray hands: Arthritic changes are most severe involving the 3rd metacarpal  phalangeal joints, with left hand arthritis more severe than right. Triangular fibrocartilage calcifications most likely reflect calcium pyrophosphate deposition.    Xray feet: Periarticular erosions involving left 2nd through 4th metatarsal  heads and right 2nd and 3rd metatarsal heads. Calcaneal spurs and plantar fascial calcification    Xray knees: Hypertrophic degenerative arthritic changes are noted. Small bilateral effusions. Menisci are calcified bilaterally    DEXA 10/23:  LEFT FEMUR -TOTAL BMD: 0.754 T-Score -2.0     LEFT FEMUR -NECK BMD: 0.798 T-Score -1.7  1/3 forearm BMD: 0.829 T-Score -0.5   FRAX Major Osteoporotic Fracture: 13.2%. Hip Fracture: 2.3%    # RA, doing well, no active synovitis on exam today  # OA   # Osteopenia with low FRAX but reports hx of 2 fragility fractures. Started on Fosamax, secondary work up is negative , stopped Fosamax due to GERD, did not take Reclast   # Long term use of IS  # Vitamin D def, replenished   # LL neuropathy, EMG -> L5 radiculopathy, might get surgery with Dr. Adamson     - Keep  mg every day (weight 53.5 kg), refilled  - If cardiology recommends stopping HCQ, then we can stop it  - Eye doctor for HCQ toxicity, saw them a few months ago, no toxicity   - Leflunomide 20 mg daily, refilled  - Follow up with ortho for knee OA  - Next DEXA due 1/26, 2 years after starting tx   - Send a message to [facility]  for Reclast dov again  - Labs before next dov (CBC,CMP, ESR, CRP)  - Continue vitamin D  - Follow up with neurosurgery for possible back surgery     RTC in 6 months    Plan, including risks and benefits, was discussed with the patient, informed on how to reach us.     To schedule an appointment, call [phone]  To reach the rheumatology office, call [phone]    Laila Alonso MD   Division of Rheumatology  [facility]

## 2025-03-04 ENCOUNTER — EVALUATION (OUTPATIENT)
Dept: PHYSICAL THERAPY | Facility: CLINIC | Age: 70
End: 2025-03-04
Payer: COMMERCIAL

## 2025-03-04 DIAGNOSIS — M41.35 THORACOGENIC SCOLIOSIS OF THORACOLUMBAR REGION: ICD-10-CM

## 2025-03-04 PROCEDURE — 97162 PT EVAL MOD COMPLEX 30 MIN: CPT | Mod: GP

## 2025-03-04 PROCEDURE — 97110 THERAPEUTIC EXERCISES: CPT | Mod: GP

## 2025-03-04 NOTE — PROGRESS NOTES
Physical Therapy    Physical Therapy Evaluation and Treatment      Patient Name: Danielle Deshpande  MRN: [id number]  Today's Date: 3/4/2025    Time Entry:   Time Calculation  Start Time: 1700  Stop Time: 1744  Time Calculation (min): 44 min  PT Evaluation Time Entry  PT Evaluation (Moderate) Time Entry: 28  PT Therapeutic Procedures Time Entry  Therapeutic Exercise Time Entry: 16                 Visit #1  2025 25 COPAY, 0 COINS, 2000 DED (MET), 9200 OOP MAX,   20 VS, ADD'L VS SUBJECT TO MEDICAL REVIEW, NO AUTH REQ     Assessment:    Patient presents with chronic thoracolumbar spine pain likely due to chronic thoracogenic scoliosis and resultant changes in joint mechanics, paraspinal mm strain. Patient demonstrates impairments of limited trunk motion, limited postural stability and lower extremity weakness. Patient would benefit from PT services to address current impairments and facilitate improvement in current activity limits. Educated patient on current POC, initial HEP and current examination findings. Patient verbalized understanding of all education and instruction provided today.     Plan:  OP PT Plan  Treatment/Interventions: Education/ Instruction, Gait training, Manual therapy, Neuromuscular re-education, Self care/ home management, Taping techniques, Therapeutic activities, Therapeutic exercises  PT Plan: Skilled PT  PT Frequency: 1 time per week  Number of Treatments Authorized: 20  Rehab Potential: Good  Plan of Care Agreement: Patient    Current Problem:   1. Thoracogenic scoliosis of thoracolumbar region  Referral to Physical Therapy          Subjective    General:   Patient presents with c/o chronic thoracolumbar spine pain. Pt has PMHx of severe scoliosis with surgical fixation in 1980 along with lumbar radiculopathy, osteoporosis and spinal stenosis. Pt has been referred to PT services for conservative management of her symptoms. Pt denies any bowel/bladder issues but endorses intermittent radicular  symptoms in both feet with prolonged standing. Pt denies any recent falls but endorses some balance troubles due to chronic knee issues which she is seeing ortho for. Patient presents to today's visit with her .     Pain:  4/10 lumbar spine R>L  Highest pain level: 7/10  Lowest pain level: 2/10  Pain worsens with: prolonged standing, lifting, repetitive movement of trunk  Pain improves with: rest, stretches, sitting with back support       Objective   Spine Musculoskeletal Exam    Gait    Antalgic: right    Trendelenburg: right    Inspection    Thoracic scoliosis: right    Lumbar scoliosis: right    Shoulder elevation: left    Lumbar prominence: right    Range of Motion    Thoracolumbar       Flexion: 50%. Flexion detail: pain and guarding.     Extension: 50%. Extension detail: pain and guarding.       Right      Lateral bendin%. Lateral bending detail: guarding.       Lateral rotation: 50%. Lateral rotation detail: pain and guarding.       Left      Lateral bendin%. Lateral bending detail: guarding.       Lateral rotation: 50%. Lateral rotation detail: pain.      Strength    Thoracolumbar       Right      Quadriceps: 4-/5.       Hamstrin-/5.       Hip abductors: 3/5.       Hip flexion: 3/5.       Hip adduction: 4-/5.        Left      Quadriceps: 4-/5.       Hamstrin-/5.       Hip abductors: 3/5.       Hip flexion: 3/5.       Hip adduction: 4-/5.         Outcome Measures:  Other Measures  Oswestry Disablity Index (FAUSTO): 25     Treatments:  Seated rows RTB  Seated rotational rows RTB  Seated clams RTB  Seated marches RTB    Handout of exercise descriptions and images issued. Skilled intervention utilized in the appropriate selection & application of above exercises. Verbal and tactile cues provided for proper form and technique. Pt. demonstrated appropriate form & verbalized understanding of optimal technique for above exercises.        EDUCATION:  Outpatient Education  Individual(s)  Educated: Patient  Education Provided: Anatomy, Body Mechanics, Fall Risk, Home Exercise Program, Home Safety, Physiology, POC, Posture    Goals:  Patient will be independent with HEP.    Patient will improve FAUSTO score to </=15    Patient will improve hip extension strength to >/=4+/5 for improved lumbopelvic stability with ambulation and ADL performance.    Patient will improve hip abduction strength to >/4+/5 for improved proximal hip stability and SL stability with ambulation.    Patient will demonstrate trunk AROM that is WNL and painless in all planes.    Patient will report 0/10 back pain with ADL performance.

## 2025-03-06 ASSESSMENT — ENCOUNTER SYMPTOMS
DEPRESSION: 0
OCCASIONAL FEELINGS OF UNSTEADINESS: 0
LOSS OF SENSATION IN FEET: 1

## 2025-03-10 ENCOUNTER — APPOINTMENT (OUTPATIENT)
Dept: RHEUMATOLOGY | Facility: CLINIC | Age: 70
End: 2025-03-10
Payer: COMMERCIAL

## 2025-03-10 ENCOUNTER — OFFICE VISIT (OUTPATIENT)
Dept: ORTHOPEDIC SURGERY | Facility: CLINIC | Age: 70
End: 2025-03-10
Payer: COMMERCIAL

## 2025-03-10 VITALS
WEIGHT: 108 LBS | SYSTOLIC BLOOD PRESSURE: 134 MMHG | HEART RATE: 82 BPM | DIASTOLIC BLOOD PRESSURE: 83 MMHG | BODY MASS INDEX: 22.57 KG/M2 | TEMPERATURE: 97.6 F | RESPIRATION RATE: 20 BRPM

## 2025-03-10 DIAGNOSIS — G57.93 NEUROPATHY INVOLVING BOTH LOWER EXTREMITIES: ICD-10-CM

## 2025-03-10 DIAGNOSIS — M06.9 RHEUMATOID ARTHRITIS INVOLVING MULTIPLE SITES, UNSPECIFIED WHETHER RHEUMATOID FACTOR PRESENT (MULTI): ICD-10-CM

## 2025-03-10 DIAGNOSIS — M06.09 RHEUMATOID ARTHRITIS OF MULTIPLE SITES WITH NEGATIVE RHEUMATOID FACTOR (MULTI): Primary | ICD-10-CM

## 2025-03-10 DIAGNOSIS — Z79.60 LONG-TERM USE OF IMMUNOSUPPRESSANT MEDICATION: ICD-10-CM

## 2025-03-10 DIAGNOSIS — Z79.899 ON LONG TERM LEFLUNOMIDE THERAPY: ICD-10-CM

## 2025-03-10 DIAGNOSIS — M11.20 CHONDROCALCINOSIS: ICD-10-CM

## 2025-03-10 DIAGNOSIS — M17.0 PRIMARY OSTEOARTHRITIS OF BOTH KNEES: Primary | ICD-10-CM

## 2025-03-10 DIAGNOSIS — E55.9 VITAMIN D DEFICIENCY: ICD-10-CM

## 2025-03-10 DIAGNOSIS — M15.0 PRIMARY OSTEOARTHRITIS INVOLVING MULTIPLE JOINTS: ICD-10-CM

## 2025-03-10 DIAGNOSIS — M41.9 KYPHOSCOLIOSIS: ICD-10-CM

## 2025-03-10 DIAGNOSIS — Z79.899 LONG-TERM USE OF PLAQUENIL: ICD-10-CM

## 2025-03-10 DIAGNOSIS — M85.852 OSTEOPENIA OF NECK OF LEFT FEMUR: ICD-10-CM

## 2025-03-10 DIAGNOSIS — Z79.83 LONG TERM USE OF BISPHOSPHONATES: ICD-10-CM

## 2025-03-10 PROCEDURE — 99214 OFFICE O/P EST MOD 30 MIN: CPT | Mod: 57 | Performed by: ORTHOPAEDIC SURGERY

## 2025-03-10 PROCEDURE — 3075F SYST BP GE 130 - 139MM HG: CPT | Performed by: STUDENT IN AN ORGANIZED HEALTH CARE EDUCATION/TRAINING PROGRAM

## 2025-03-10 PROCEDURE — 3079F DIAST BP 80-89 MM HG: CPT | Performed by: STUDENT IN AN ORGANIZED HEALTH CARE EDUCATION/TRAINING PROGRAM

## 2025-03-10 PROCEDURE — 99214 OFFICE O/P EST MOD 30 MIN: CPT | Performed by: STUDENT IN AN ORGANIZED HEALTH CARE EDUCATION/TRAINING PROGRAM

## 2025-03-10 PROCEDURE — 1036F TOBACCO NON-USER: CPT | Performed by: ORTHOPAEDIC SURGERY

## 2025-03-10 PROCEDURE — 1159F MED LIST DOCD IN RCRD: CPT | Performed by: ORTHOPAEDIC SURGERY

## 2025-03-10 PROCEDURE — 1123F ACP DISCUSS/DSCN MKR DOCD: CPT | Performed by: STUDENT IN AN ORGANIZED HEALTH CARE EDUCATION/TRAINING PROGRAM

## 2025-03-10 PROCEDURE — 1159F MED LIST DOCD IN RCRD: CPT | Performed by: STUDENT IN AN ORGANIZED HEALTH CARE EDUCATION/TRAINING PROGRAM

## 2025-03-10 PROCEDURE — 1160F RVW MEDS BY RX/DR IN RCRD: CPT | Performed by: STUDENT IN AN ORGANIZED HEALTH CARE EDUCATION/TRAINING PROGRAM

## 2025-03-10 PROCEDURE — 1123F ACP DISCUSS/DSCN MKR DOCD: CPT | Performed by: ORTHOPAEDIC SURGERY

## 2025-03-10 PROCEDURE — 99204 OFFICE O/P NEW MOD 45 MIN: CPT | Performed by: ORTHOPAEDIC SURGERY

## 2025-03-10 RX ORDER — HYDROXYCHLOROQUINE SULFATE 200 MG/1
200 TABLET, FILM COATED ORAL DAILY
Qty: 90 TABLET | Refills: 1 | Status: SHIPPED | OUTPATIENT
Start: 2025-03-10 | End: 2025-09-06

## 2025-03-10 NOTE — PROGRESS NOTES
History of Present Illness:  Patient presents with bilateral knee pain, left worse than right.  The patient localizes the pain diffusely.  Recently there has been concern for falls and instability.  There is increasing difficulty with activities of daily living and significant disability related to the knee pain.  The patient endorses the following failed non-operative treatments: Rest, ice, anti-inflammatories as well as previous corticosteroid injections of viscosupplementation.   There is increasing frustration with persistent pain and swelling and decreasing distance of ambulation.  Pain is moderate, achy, diffuse.  Better with rest, worse with activity.     Her most recent corticosteroid injection was on 2/17/2025 with Dr. Ribeiro to the left knee.    She has a history of prior arthroscopic meniscectomy in the left knee as well as prior tibial plateau fracture to the right knee.    Review of Systems   GENERAL: Negative for malaise, significant weight loss, fever  MUSCULOSKELETAL: see HPI  NEURO:  Negative    Past Medical History:   Diagnosis Date    Acute recurrent sinusitis, unspecified 02/04/2022    Acute recurrent sinusitis, unspecified location    Arthritis 2010    Colon polyp 1986    Heart disease     WPW    Hypertension 1993    Other signs and symptoms in breast 06/11/2014    Inversion of nipple    Personal history of other diseases of the circulatory system 04/02/2014    History of hypertension    Personal history of other diseases of the female genital tract 08/14/2020    History of postmenopausal bleeding    Personal history of other diseases of the female genital tract 01/18/2022    History of vaginitis    Personal history of other diseases of the musculoskeletal system and connective tissue 04/02/2014    Personal history of scoliosis    Personal history of other specified conditions 06/11/2014    History of lump of right breast    Personal history of other specified conditions 09/01/2020    History  of fatigue    PONV (postoperative nausea and vomiting)     Unspecified hydronephrosis 2020    Hydronephrosis of right kidney     Past Surgical History:   Procedure Laterality Date    BACK SURGERY  2014    Back Surgery    BREAST BIOPSY  2014    Biopsy Breast Open     SECTION, LOW TRANSVERSE      COLONOSCOPY  2014    Colonoscopy (Fiberoptic)    OTHER SURGICAL HISTORY  2022    Percutaneous nephrolithotomy    OTHER SURGICAL HISTORY  2022    Renal lithotripsy       Current Outpatient Medications:     amitriptyline (Elavil) 10 mg tablet, Take 1 tablet (10 mg) by mouth once daily at bedtime., Disp: 90 tablet, Rfl: 3    biotin 5,000 mcg tablet,disintegrating, Take 1 tablet daily., Disp: , Rfl:     calcium carbonate (Oscal) 500 mg calcium (1,250 mg) tablet, Take 1 tablet (1,250 mg) by mouth once daily., Disp: , Rfl:     cholecalciferol (Vitamin D-3) 50 mcg (2,000 unit) capsule, Take 1 capsule daily, Disp: , Rfl:     cranberry 500 mg capsule, Take 1 capsule by mouth once daily., Disp: , Rfl:     docusate sodium (Colace) 100 mg capsule, Take 1 capsule (100 mg) by mouth once daily., Disp: , Rfl:     estradiol (Estrace) 0.01 % (0.1 mg/gram) vaginal cream, Insert 0.25 Applicatorfuls (1 g) into the vagina 2 times a week., Disp: 42.5 g, Rfl: 5    glucosamine-chondroitin (Osteo Bi-Flex) 250-200 mg tablet, Take 1 tablet by mouth 2 times a day., Disp: , Rfl:     hydroxychloroquine (Plaquenil) 200 mg tablet, Take 1 tablet (200 mg) by mouth once daily., Disp: 90 tablet, Rfl: 1    leflunomide (Arava) 20 mg tablet, Take 1 tablet (20 mg) by mouth once daily., Disp: 180 tablet, Rfl: 1    lisinopril 40 mg tablet, Take 1 tablet (40 mg) by mouth once daily. (Patient taking differently: Take 0.5 tablets (20 mg) by mouth once daily.), Disp: 90 tablet, Rfl: 3    magnesium glycinate 100 mg tablet, Take 1 tablet by mouth once daily., Disp: , Rfl:     metoprolol succinate XL (Toprol-XL) 50 mg 24 hr  tablet, Take 1 tablet (50 mg) by mouth once daily. Do not crush or chew.- has been taking 50 mg daily, Disp: 90 tablet, Rfl: 3    multivitamin with minerals tablet, Take 0.5 tablets by mouth once daily., Disp: , Rfl:     omeprazole (PriLOSEC) 40 mg DR capsule, Take 1 capsule (40 mg) by mouth 2 times a day before meals. Do not crush or chew., Disp: 60 capsule, Rfl: 1    turmeric root extract 500 mg capsule, Take 1 capsule by mouth 2 times a day., Disp: , Rfl:     zoledronic acid (Reclast) 5 mg/100 mL piggyback, Infuse 100 mL (5 mg) at 400 mL/hr over 15 minutes into a venous catheter yearly., Disp: 100 mL, Rfl: 0      Physical Examination:  Left Knee:  Skin healthy and intact  No gross swelling or ecchymosis  Alignment: varus      Effusion: moderate       ROM: Decreased  Crepitance with range of motion  No pain with internal rotation of the hip  Tenderness to palpation over medial and lateral joint line and with patellar compression     No laxity to valgus stress  No laxity to varus stress  Negative Lachman´s test  Negative posterior drawer test  Mild pain with Wolfgang´s test  Neurovascular exam normal distally  2+ DP pulse and good cap refill    Radiographs:  Severe degenerative joint disease with loss of joint space, subchondral sclerosis and cystic changes, and osteophyte formation    Assessment:  Patient with bilateral knee osteoarthritis    Plan:  We discussed the diagnosis of degenerative joint disease of the knee.  We reviewed an evidence-based approach to osteoarthritis of the knee.  We strongly encouraged low-impact aerobic activity and non-opioid analgesics.     We discussed temporary pain relief with corticosteroid injections and the associated risks.  We also discussed the conflicting evidence regarding viscosupplementation and potential long-term risks with NSAID´s.  We reviewed the role of bracing for instability and physical therapy for atrophy and gait abnormalities.  The patient elected for further  discussion and planning for left total knee arthroplasty.    Currently taking leflunomide and hydroxychloroquine for rheumatologic arthritis.    Bob Ruzi PA-C    In a face to face encounter, I evaluated the patient and performed a physical examination, discussed pertinent diagnostic studies if indicated and discussed diagnosis and management strategies with both the patient and physician assistant / nurse practitioner.  I reviewed the PA/NP's note and agree with the documented findings and plan of care.    Patient with severe degenerative joint disease, rheumatoid arthritis.  We discussed total knee arthroplasty with the patient she is 1 month status post corticosteroid injection would need to delay for 2 months.  We reviewed her rheumatologic medicines which per most recent guidelines are safe to continue.    The patient has failed to improve with multiple non-operative modalities.  There is increasing difficulty with activities of daily living and concern for falls.  The patient is endorsing severe pain and disability.      We had a lengthy discussion regarding total knee arthroplasty including the orthopaedic risks, including but not limited to: stiffness, infection, hematoma, early aseptic loosening, neurologic or vascular injury, clicking, difficulty kneeling and incomplete relief of pain.  We reviewed the medical risks, including but not limited to: deep venous thrombosis, pulmonary embolism, and cardiovascular/pulmonary issues.    We discussed the anticipated longevity of the implants and potential for revision surgery.  We also discussed anticoagulation, rehabilitation goals, and the hospital course.  We discussed the likelihood of opioid analgesics and risks associated with them.    The next step is to obtain medical risk stratification and encouraged the pre-operative information seminar at the hospital.  We are happy to provide assistance and counseling if the patient has any additional  concerns.      Sebastián Reardon MD

## 2025-03-11 LAB
ALBUMIN SERPL-MCNC: 4 G/DL (ref 3.6–5.1)
ALP SERPL-CCNC: 63 U/L (ref 37–153)
ALT SERPL-CCNC: 22 U/L (ref 6–29)
ANION GAP SERPL CALCULATED.4IONS-SCNC: 7 MMOL/L (CALC) (ref 7–17)
AST SERPL-CCNC: 23 U/L (ref 10–35)
BASOPHILS # BLD AUTO: 70 CELLS/UL (ref 0–200)
BASOPHILS NFR BLD AUTO: 1.9 %
BILIRUB SERPL-MCNC: 0.7 MG/DL (ref 0.2–1.2)
BUN SERPL-MCNC: 23 MG/DL (ref 7–25)
CALCIUM SERPL-MCNC: 9.1 MG/DL (ref 8.6–10.4)
CHLORIDE SERPL-SCNC: 106 MMOL/L (ref 98–110)
CO2 SERPL-SCNC: 31 MMOL/L (ref 20–32)
CREAT SERPL-MCNC: 0.72 MG/DL (ref 0.6–1)
CRP SERPL-MCNC: <3 MG/L
EGFRCR SERPLBLD CKD-EPI 2021: 90 ML/MIN/1.73M2
EOSINOPHIL # BLD AUTO: 152 CELLS/UL (ref 15–500)
EOSINOPHIL NFR BLD AUTO: 4.1 %
ERYTHROCYTE [DISTWIDTH] IN BLOOD BY AUTOMATED COUNT: 11.9 % (ref 11–15)
ERYTHROCYTE [SEDIMENTATION RATE] IN BLOOD BY WESTERGREN METHOD: 2 MM/H
GLUCOSE SERPL-MCNC: 77 MG/DL (ref 65–139)
HCT VFR BLD AUTO: 39.8 % (ref 35–45)
HGB BLD-MCNC: 12.8 G/DL (ref 11.7–15.5)
LYMPHOCYTES # BLD AUTO: 814 CELLS/UL (ref 850–3900)
LYMPHOCYTES NFR BLD AUTO: 22 %
MCH RBC QN AUTO: 29.3 PG (ref 27–33)
MCHC RBC AUTO-ENTMCNC: 32.2 G/DL (ref 32–36)
MCV RBC AUTO: 91.1 FL (ref 80–100)
MONOCYTES # BLD AUTO: 381 CELLS/UL (ref 200–950)
MONOCYTES NFR BLD AUTO: 10.3 %
NEUTROPHILS # BLD AUTO: 2283 CELLS/UL (ref 1500–7800)
NEUTROPHILS NFR BLD AUTO: 61.7 %
PLATELET # BLD AUTO: 257 THOUSAND/UL (ref 140–400)
PMV BLD REES-ECKER: 9.8 FL (ref 7.5–12.5)
POTASSIUM SERPL-SCNC: 4.2 MMOL/L (ref 3.5–5.3)
PROT SERPL-MCNC: 6.1 G/DL (ref 6.1–8.1)
RBC # BLD AUTO: 4.37 MILLION/UL (ref 3.8–5.1)
SODIUM SERPL-SCNC: 144 MMOL/L (ref 135–146)
WBC # BLD AUTO: 3.7 THOUSAND/UL (ref 3.8–10.8)

## 2025-03-14 DIAGNOSIS — M81.0 OSTEOPOROSIS, POST-MENOPAUSAL: Primary | ICD-10-CM

## 2025-03-14 RX ORDER — ALBUTEROL SULFATE 0.83 MG/ML
3 SOLUTION RESPIRATORY (INHALATION) AS NEEDED
OUTPATIENT
Start: 2025-03-17

## 2025-03-14 RX ORDER — ZOLEDRONIC ACID 5 MG/100ML
5 INJECTION, SOLUTION INTRAVENOUS
Qty: 100 ML | Refills: 0 | Status: SHIPPED
Start: 2025-03-14

## 2025-03-14 RX ORDER — EPINEPHRINE 0.3 MG/.3ML
0.3 INJECTION SUBCUTANEOUS EVERY 5 MIN PRN
OUTPATIENT
Start: 2025-03-17

## 2025-03-14 RX ORDER — DIPHENHYDRAMINE HYDROCHLORIDE 50 MG/ML
50 INJECTION INTRAMUSCULAR; INTRAVENOUS AS NEEDED
OUTPATIENT
Start: 2025-03-17

## 2025-03-14 RX ORDER — ZOLEDRONIC ACID 5 MG/100ML
5 INJECTION, SOLUTION INTRAVENOUS ONCE
OUTPATIENT
Start: 2025-03-17

## 2025-03-14 RX ORDER — FAMOTIDINE 10 MG/ML
20 INJECTION, SOLUTION INTRAVENOUS ONCE AS NEEDED
OUTPATIENT
Start: 2025-03-17

## 2025-03-14 NOTE — PROGRESS NOTES
Per Dr. Alonso- The patient was approved before for Reclast but it seems she never scheduled her dov   I need to start her on it, she said she is getting new medicare insurance   Can someone please help with the PA, if needed? And getting her scheduled     Entered Reclast orders for [facility] for auth/scheduling

## 2025-03-17 DIAGNOSIS — M81.0 OSTEOPOROSIS, POST-MENOPAUSAL: ICD-10-CM

## 2025-03-19 ENCOUNTER — OFFICE VISIT (OUTPATIENT)
Dept: NEUROSURGERY | Facility: HOSPITAL | Age: 70
End: 2025-03-19
Payer: COMMERCIAL

## 2025-03-19 ENCOUNTER — HOSPITAL ENCOUNTER (OUTPATIENT)
Dept: RADIOLOGY | Facility: HOSPITAL | Age: 70
Discharge: HOME | End: 2025-03-19
Payer: COMMERCIAL

## 2025-03-19 VITALS
SYSTOLIC BLOOD PRESSURE: 170 MMHG | DIASTOLIC BLOOD PRESSURE: 81 MMHG | OXYGEN SATURATION: 99 % | HEART RATE: 100 BPM | BODY MASS INDEX: 22.21 KG/M2 | RESPIRATION RATE: 20 BRPM | WEIGHT: 106.26 LBS | TEMPERATURE: 98.1 F

## 2025-03-19 DIAGNOSIS — M41.35 THORACOGENIC SCOLIOSIS OF THORACOLUMBAR REGION: ICD-10-CM

## 2025-03-19 PROCEDURE — 3077F SYST BP >= 140 MM HG: CPT | Performed by: NEUROLOGICAL SURGERY

## 2025-03-19 PROCEDURE — 1036F TOBACCO NON-USER: CPT | Performed by: NEUROLOGICAL SURGERY

## 2025-03-19 PROCEDURE — 99213 OFFICE O/P EST LOW 20 MIN: CPT | Performed by: NEUROLOGICAL SURGERY

## 2025-03-19 PROCEDURE — 77073 BONE LENGTH STUDIES: CPT

## 2025-03-19 PROCEDURE — 1123F ACP DISCUSS/DSCN MKR DOCD: CPT | Performed by: NEUROLOGICAL SURGERY

## 2025-03-19 PROCEDURE — 3079F DIAST BP 80-89 MM HG: CPT | Performed by: NEUROLOGICAL SURGERY

## 2025-03-19 PROCEDURE — 1125F AMNT PAIN NOTED PAIN PRSNT: CPT | Performed by: NEUROLOGICAL SURGERY

## 2025-03-19 ASSESSMENT — PAIN SCALES - GENERAL: PAINLEVEL_OUTOF10: 5

## 2025-03-19 NOTE — PROGRESS NOTES
[facility]   Neurosurgery    Diagnosis  Danielle was seen today for follow-up.  Diagnoses and all orders for this visit:  Thoracogenic scoliosis of thoracolumbar region  -     Referral to Neurosurgery      Patient Discussion/Summary  Danielle has a history of thoracolumbar scoliosis, having previous undergone instrumented fusion with a Mendez charles in the 1980s.  She has subsequently been very active throughout her life, with little to no limitations from her scoliosis.    Specifically, she describes little to no pain.  Although she may have occasional discomfort in her back, this is not a dominant feature.  She has developed some radicular irritation on the right, primarily in an L4 or L5 distribution.  This is described as an occasional pain that radiates from her back into her knee, shin and foot.    More bothersome for her has been the development of bilateral lower extremity paresthesias, primarily in the shins and radiating into her ankles.  She did have a previous EMG from 2024 demonstrating a chronic mild right L5 radiculopathy with no active denervation.    Complicating this is a history of rheumatoid arthritis with bilateral knee pathology.  She is under consideration to have knee replacements.  She wears a brace at all times.    We explained that spinal cord stimulation is excellent for treating pain, particularly that related to scoliosis as well as concurrent neuropathic pain.  Given that her primary symptoms are paresthesias, stimulation would thus not be recommended.  She may be symptomatic from the knee orthoses as well as the mild lumbar irritation, and by addressing her knees she may see improvement.    As such, she may follow-up on as-needed basis.      History of Present Illness  Chief Complaint:   Chief Complaint   Patient presents with    Follow-up         HPI: Danielle Deshpande is a 70 y.o. female with PMH of OA and RA, with scoliosis, with remote history of a thoracolumbar scoliosis  surgery in 1980, referred here by Dr. Lele Adamson for consideration of neuromodulation for progressive low back and leg pain.     Patient reports having scoliosis that was initially treated conservatively with bracing, and ultimately underwent a thoracolumbar fusion with single 14” charles in 1980. She reports doing very well following surgery, and was able to have 5 children in 7 years, including twins. Patient states that in the last 3-4 years she has been having progressive change in sensation, numnbess, and pressure from the mid-calf down in bilateral legs. She describes this as more bothersome than painful. She also reports occasional right-sided aching back pain, which is worse with activity, prolonged standing, or at the end of the day, and occurs several times a week; avg 5-7/10 pain. At this time, she reports her most bothersome pain as being her bilateral knees, which she was diagnosed with bone on bone OA and is following with ortho surgery, Dr. Reardon. She has received a series of 3 gel injections for this, and states she is planned for eventual total knee replacement. Patient recently did a PT session, but had significant thoracic tenderness following the session, and is concerned about doing further visits. She will use occasional heat application for her back pain, as well as some OTC herbal remedies. She also has a history of RA and is on plaquenil and leflunomide.       ROS: As noted in HPI.      Previous History  Past Medical History:   Diagnosis Date    Acute recurrent sinusitis, unspecified 02/04/2022    Acute recurrent sinusitis, unspecified location    Arthritis 2010    Colon polyp 1986    Heart disease     WPW    Hypertension 1993    Other signs and symptoms in breast 06/11/2014    Inversion of nipple    Personal history of other diseases of the circulatory system 04/02/2014    History of hypertension    Personal history of other diseases of the female genital tract 08/14/2020    History of  postmenopausal bleeding    Personal history of other diseases of the female genital tract 2022    History of vaginitis    Personal history of other diseases of the musculoskeletal system and connective tissue 2014    Personal history of scoliosis    Personal history of other specified conditions 2014    History of lump of right breast    Personal history of other specified conditions 2020    History of fatigue    PONV (postoperative nausea and vomiting) 1980    Unspecified hydronephrosis 2020    Hydronephrosis of right kidney     Past Surgical History:   Procedure Laterality Date    BACK SURGERY  2014    Back Surgery    BREAST BIOPSY  2014    Biopsy Breast Open     SECTION, LOW TRANSVERSE  1991    COLONOSCOPY  2014    Colonoscopy (Fiberoptic)    OTHER SURGICAL HISTORY  2022    Percutaneous nephrolithotomy    OTHER SURGICAL HISTORY  2022    Renal lithotripsy     Social History     Tobacco Use    Smoking status: Never    Smokeless tobacco: Never   Vaping Use    Vaping status: Never Used   Substance Use Topics    Alcohol use: Not Currently    Drug use: Never     Family History   Problem Relation Name Age of Onset    Other (Non-Hodkins Lymphoma) Mother carmen Osborn     Thyroid disease Mother carmen Osborn     Hypertension Mother carmen Osborn     Emphysema Father      Cancer Father          Urinary Bladder    Stomach cancer Mother's Brother Ortiz Suh      Allergies   Allergen Reactions    Diphenhydramine Hcl Other     Confusion     Levaquin [Levofloxacin] Other     Confusion    Novocain [Procaine] Other     Palpatations     Current Outpatient Medications   Medication Instructions    amitriptyline (ELAVIL) 10 mg, oral, Nightly    biotin 5,000 mcg tablet,disintegrating Take 1 tablet daily.    calcium carbonate (Oscal) 500 mg calcium (1,250 mg) tablet 1 tablet, Daily    cholecalciferol (Vitamin D-3) 50 mcg (2,000 unit) capsule Take 1 capsule daily     cranberry 500 mg capsule 1 capsule, Daily    docusate sodium (Colace) 100 mg capsule Take 1 capsule (100 mg) by mouth once daily.    estradiol (ESTRACE) 1 g, vaginal, 2 times weekly    glucosamine-chondroitin (Osteo Bi-Flex) 250-200 mg tablet 1 tablet, 2 times daily    hydroxychloroquine (PLAQUENIL) 200 mg, oral, Daily    leflunomide (ARAVA) 20 mg, oral, Daily    lisinopril 40 mg, oral, Daily    magnesium glycinate 100 mg tablet 1 tablet, Daily    metoprolol succinate XL (TOPROL-XL) 50 mg, oral, Daily, Do not crush or chew.- has been taking 50 mg daily    multivitamin with minerals tablet 0.5 tablets, Daily    omeprazole (PRILOSEC) 40 mg, oral, 2 times daily before meals, Do not crush or chew.    turmeric root extract 500 mg capsule 1 capsule, 2 times daily    zoledronic acid (Reclast) 5 mg/100 mL piggyback 5 mg, intravenous, Yearly         Vitals  /81 (BP Location: Left arm, Patient Position: Sitting, BP Cuff Size: Small adult)   Pulse 100   Temp 36.7 °C (98.1 °F) (Temporal)   Resp 20   Wt 48.2 kg (106 lb 4.2 oz)   SpO2 99%   BMI 22.21 kg/m²       Physical Exam  Constitutional: Well appearing. No acute distress.   Musculoskeletal: No visible deformity of joints and nails. Normal ROM.  Orientation: Oriented to self, place, and time  Language: Fluid speech and intact cognition  CN 2: Normal   CN 3, 4, and 6: Normal   CN 5: Normal   CN 7: Normal   CN 8: Normal   CN 9 and 10: Normal   CN 11: Normal   CN 12: Normal   Muscle strength: Full and equal strengths throughout both UE and LE.  Muscle tone: No atrophy, abnormal movements, flaccidity, cogwheeling or spasticity.   Gait and station: Slow, deliberate and antalgic gait.  Sensation: Reduced sensation distal lateral RLE, otherwise grossly intact throughout all dermatomes. No allodynia.   Reflexes: Patellars deferred due to wearing knee braces. Negative Willis's sign. No clonus at ankles.    Coordination: No dysmetria.  Mood and Affect: Appropriate mood  and affect.       Results  An EOS x-ray was reviewed, demonstrating a Mendez charles extending from the lumbar spine to the upper thoracic levels.  There is a long segment thoracolumbar dextrocurvature.

## 2025-03-24 ENCOUNTER — ANESTHESIA (OUTPATIENT)
Dept: GASTROENTEROLOGY | Facility: HOSPITAL | Age: 70
End: 2025-03-24
Payer: COMMERCIAL

## 2025-03-24 ENCOUNTER — ANESTHESIA EVENT (OUTPATIENT)
Dept: GASTROENTEROLOGY | Facility: HOSPITAL | Age: 70
End: 2025-03-24
Payer: COMMERCIAL

## 2025-03-24 ENCOUNTER — HOSPITAL ENCOUNTER (OUTPATIENT)
Dept: GASTROENTEROLOGY | Facility: HOSPITAL | Age: 70
Discharge: HOME | End: 2025-03-24
Payer: COMMERCIAL

## 2025-03-24 VITALS
TEMPERATURE: 98.4 F | BODY MASS INDEX: 22.88 KG/M2 | OXYGEN SATURATION: 95 % | WEIGHT: 109 LBS | HEIGHT: 58 IN | HEART RATE: 71 BPM | DIASTOLIC BLOOD PRESSURE: 71 MMHG | SYSTOLIC BLOOD PRESSURE: 113 MMHG | RESPIRATION RATE: 16 BRPM

## 2025-03-24 DIAGNOSIS — K25.3 ACUTE GASTRIC ULCER WITHOUT HEMORRHAGE OR PERFORATION: ICD-10-CM

## 2025-03-24 DIAGNOSIS — K21.9 GASTROESOPHAGEAL REFLUX DISEASE WITHOUT ESOPHAGITIS: ICD-10-CM

## 2025-03-24 PROCEDURE — 7100000010 HC PHASE TWO TIME - EACH INCREMENTAL 1 MINUTE

## 2025-03-24 PROCEDURE — 2500000004 HC RX 250 GENERAL PHARMACY W/ HCPCS (ALT 636 FOR OP/ED): Performed by: ANESTHESIOLOGIST ASSISTANT

## 2025-03-24 PROCEDURE — 3700000002 HC GENERAL ANESTHESIA TIME - EACH INCREMENTAL 1 MINUTE

## 2025-03-24 PROCEDURE — 7100000009 HC PHASE TWO TIME - INITIAL BASE CHARGE

## 2025-03-24 PROCEDURE — 3700000001 HC GENERAL ANESTHESIA TIME - INITIAL BASE CHARGE

## 2025-03-24 PROCEDURE — 43239 EGD BIOPSY SINGLE/MULTIPLE: CPT | Performed by: INTERNAL MEDICINE

## 2025-03-24 RX ORDER — ONDANSETRON HYDROCHLORIDE 2 MG/ML
INJECTION, SOLUTION INTRAVENOUS AS NEEDED
Status: DISCONTINUED | OUTPATIENT
Start: 2025-03-24 | End: 2025-03-24

## 2025-03-24 RX ORDER — METOPROLOL TARTRATE 1 MG/ML
INJECTION, SOLUTION INTRAVENOUS AS NEEDED
Status: DISCONTINUED | OUTPATIENT
Start: 2025-03-24 | End: 2025-03-24

## 2025-03-24 RX ORDER — OMEPRAZOLE 40 MG/1
40 CAPSULE, DELAYED RELEASE ORAL
Qty: 90 CAPSULE | Refills: 3 | Status: SHIPPED | OUTPATIENT
Start: 2025-03-24 | End: 2026-03-24

## 2025-03-24 RX ORDER — FENTANYL CITRATE 50 UG/ML
INJECTION, SOLUTION INTRAMUSCULAR; INTRAVENOUS AS NEEDED
Status: DISCONTINUED | OUTPATIENT
Start: 2025-03-24 | End: 2025-03-24

## 2025-03-24 RX ORDER — PROPOFOL 10 MG/ML
INJECTION, EMULSION INTRAVENOUS AS NEEDED
Status: DISCONTINUED | OUTPATIENT
Start: 2025-03-24 | End: 2025-03-24

## 2025-03-24 RX ADMIN — PROPOFOL 150 MCG/KG/MIN: 10 INJECTION, EMULSION INTRAVENOUS at 12:37

## 2025-03-24 RX ADMIN — METOPROLOL TARTRATE 2 MG: 5 INJECTION, SOLUTION INTRAVENOUS at 12:39

## 2025-03-24 RX ADMIN — FENTANYL CITRATE 100 MCG: 50 INJECTION, SOLUTION INTRAMUSCULAR; INTRAVENOUS at 12:37

## 2025-03-24 RX ADMIN — ONDANSETRON 4 MG: 2 INJECTION, SOLUTION INTRAMUSCULAR; INTRAVENOUS at 12:44

## 2025-03-24 ASSESSMENT — PAIN SCALES - GENERAL
PAINLEVEL_OUTOF10: 0 - NO PAIN

## 2025-03-24 ASSESSMENT — COLUMBIA-SUICIDE SEVERITY RATING SCALE - C-SSRS
1. IN THE PAST MONTH, HAVE YOU WISHED YOU WERE DEAD OR WISHED YOU COULD GO TO SLEEP AND NOT WAKE UP?: NO
6. HAVE YOU EVER DONE ANYTHING, STARTED TO DO ANYTHING, OR PREPARED TO DO ANYTHING TO END YOUR LIFE?: NO
2. HAVE YOU ACTUALLY HAD ANY THOUGHTS OF KILLING YOURSELF?: NO

## 2025-03-24 ASSESSMENT — PAIN - FUNCTIONAL ASSESSMENT
PAIN_FUNCTIONAL_ASSESSMENT: 0-10

## 2025-03-24 NOTE — ANESTHESIA PREPROCEDURE EVALUATION
Patient: Danielle Deshpande    Procedure Information       Date/Time: 03/24/25 1140    Scheduled providers: Sera VINSON MD    Procedure: EGD    Location: [facility] - [place], [place]            Relevant Problems   Cardiac   (+) Benign essential hypertension   (+) Mitral regurgitation   (+) Tricuspid regurgitation   (+) Felton-Parkinson-White (WPW) syndrome      Neuro   (+) Lumbosacral radiculopathy at L5      GI   (+) Gastroesophageal reflux disease without esophagitis   (+) PUD (peptic ulcer disease)      /Renal   (+) Nephrolithiasis      Musculoskeletal   (+) Kyphoscoliosis   (+) Rheumatoid arthritis   (+) Spinal stenosis      HEENT   (+) Sensorineural hearing loss, bilateral       Clinical information reviewed:   Tobacco  Allergies  Meds   Med Hx  Surg Hx  OB Status  Fam Hx  Soc   Hx        NPO Detail:  NPO/Void Status  Carbohydrate Drink Given Prior to Surgery? : N  Date of Last Liquid: 03/23/25  Time of Last Liquid: 2345  Date of Last Solid: 03/23/25  Time of Last Solid: 2000  Last Intake Type: Clear fluids  Time of Last Void: 1000         Physical Exam    Airway  Mallampati: I     Cardiovascular   Rhythm: regular  Rate: normal     Dental    Pulmonary   Breath sounds clear to auscultation     Abdominal   Abdomen: soft         Anesthesia Plan    History of general anesthesia?: yes  History of complications of general anesthesia?: no    ASA 2     MAC     intravenous induction   Anesthetic plan and risks discussed with patient.    Plan discussed with CAA and attending.

## 2025-03-24 NOTE — ANESTHESIA POSTPROCEDURE EVALUATION
Patient: Danielle Deshpande    Procedure Summary       Date: 03/24/25 Room / Location: [facility]    Anesthesia Start: 1232 Anesthesia Stop: 1253    Procedure: EGD Diagnosis: Acute gastric ulcer without hemorrhage or perforation    Scheduled Providers: Sera VINSON MD Responsible Provider: Bhavna Seay MD    Anesthesia Type: MAC ASA Status: 2            Anesthesia Type: MAC    Vitals Value Taken Time   /64 03/24/25 1324   Temp 36.9 °C (98.4 °F) 03/24/25 1255   Pulse 79 03/24/25 1324   Resp 18 03/24/25 1324   SpO2 95 % 03/24/25 1324       Anesthesia Post Evaluation    Patient location during evaluation: PACU  Patient participation: complete - patient participated  Level of consciousness: awake and alert  Pain management: adequate  Airway patency: patent  Cardiovascular status: acceptable  Respiratory status: acceptable  Hydration status: acceptable  Postoperative Nausea and Vomiting: none        No notable events documented.

## 2025-03-24 NOTE — DISCHARGE INSTRUCTIONS
Patient Instructions Post Endoscopy Procedure      The anesthetics, sedatives or narcotics which were given to you today will be acting in your body for the next 24 hours, so you might feel a little sleepy or groggy.  This feeling should slowly wear off. Carefully read and follow the instructions.     You received sedation today:  - Do not drive or operate any machinery or power tools of any kind.   - No alcoholic beverages today, not even beer or wine.  - Do not make any important decisions or sign any legal documents.  - No over the counter medications that contain alcohol or that may cause drowsiness.    While it is common to experience mild to moderate abdominal distention, gas, or belching after your procedure, if any of these symptoms occur following discharge from the GI Lab or within one week of having your procedure, call the [facility] to be advised whether a visit to your nearest Urgent Care or Emergency Department is indicated.  Take this paper with you if you go.   - If you develop an allergic reaction to the medications that were given during your procedure such as difficulty breathing, rash, hives, severe nausea, vomiting or lightheadedness.  - If you experience chest pain, shortness of breath, severe abdominal pain, fevers and chills.  -If you develop signs and symptoms of bleeding such as blood in your spit, if your stools turn black, tarry, or bloody  - If you have not urinated within 8 hours following your procedure.  - If your IV site becomes painful, red, inflamed, or looks infected.      Your physician recommends the additional following instructions:    -You have a contact number available for emergencies. The signs and symptoms of potential delayed complications were discussed with you. You may return to normal activities tomorrow.  -Resume your previous diet or other if specified.  -Continue your present medications.   -We are waiting for your pathology results, if  applicable. The results will be available in [facility]. I will send you a message with any recommendations.  -The findings and recommendations have been discussed with you and/or family.  -Please see Medication Reconciliation Form for new medication/medications prescribed.       In the event of an emergency please go to the closest Emergency Department or call Dr. Ng at [phone]

## 2025-03-24 NOTE — H&P
Outpatient Hospital Procedure    Patient Profile-Procedures  Initial Info  Patient Demographics  Name Danielle Deshpande  Date of Birth 1955  MRN [id number]  Address   [address] [facility] [id number] [facility] [id number]    Primary Phone Number [phone]  Secondary Phone Number    PCP [place]    Procedures   EGD      Indication:  Follow up ulcer    Primary contact name and number   Extended Emergency Contact Information  Primary Emergency Contact: Adalberto Deshpande  Home Phone: [phone]  Relation: Spouse    General Health  Weight   Vitals:    03/24/25 1047   Weight: 49.4 kg (109 lb)     BMI Body mass index is 22.78 kg/m².    Allergies  Allergies   Allergen Reactions    Diphenhydramine Hcl Other     Confusion     Levaquin [Levofloxacin] Other     Confusion    Novocain [Procaine] Other     Palpatations       Past Medical History   Past Medical History:   Diagnosis Date    Acute recurrent sinusitis, unspecified 02/04/2022    Acute recurrent sinusitis, unspecified location    Arthritis 2010    Autoimmune disorder (Multi)     Rheumatoid arthritis    Colon polyp 1986    Heart disease     WPW    Hypertension 1993    Other signs and symptoms in breast 06/11/2014    Inversion of nipple    Personal history of other diseases of the circulatory system 04/02/2014    History of hypertension    Personal history of other diseases of the female genital tract 08/14/2020    History of postmenopausal bleeding    Personal history of other diseases of the female genital tract 01/18/2022    History of vaginitis    Personal history of other diseases of the musculoskeletal system and connective tissue 04/02/2014    Personal history of scoliosis    Personal history of other specified conditions 06/11/2014    History of lump of right breast    Personal history of other specified conditions 09/01/2020    History of fatigue    PONV (postoperative nausea and vomiting) 1980    Unspecified  hydronephrosis 09/01/2020    Hydronephrosis of right kidney       Provider assessment  Diagnosis  Medication Reviewed - yes  Prior to Admission medications    Medication Sig Start Date End Date Taking? Authorizing Provider   amitriptyline (Elavil) 10 mg tablet Take 1 tablet (10 mg) by mouth once daily at bedtime. 9/9/24 9/9/25 Yes [place] DO   biotin 5,000 mcg tablet,disintegrating Take 1 tablet daily.   Yes Historical Provider, MD   calcium carbonate (Oscal) 500 mg calcium (1,250 mg) tablet Take 1 tablet (1,250 mg) by mouth once daily.   Yes Historical Provider, MD   cholecalciferol (Vitamin D-3) 50 mcg (2,000 unit) capsule Take 1 capsule daily   Yes Historical Provider, MD   cranberry 500 mg capsule Take 1 capsule by mouth once daily.   Yes Historical Provider, MD   docusate sodium (Colace) 100 mg capsule Take 1 capsule (100 mg) by mouth once daily.   Yes Historical Provider, MD   estradiol (Estrace) 0.01 % (0.1 mg/gram) vaginal cream Insert 0.25 Applicatorfuls (1 g) into the vagina 2 times a week. 7/4/24  Yes TRENTON Cordon-CNP   glucosamine-chondroitin (Osteo Bi-Flex) 250-200 mg tablet Take 1 tablet by mouth 2 times a day.   Yes Historical Provider, MD   hydroxychloroquine (Plaquenil) 200 mg tablet Take 1 tablet (200 mg) by mouth once daily. 3/10/25 9/6/25 Yes Laila Alonso MD   leflunomide (Arava) 20 mg tablet Take 1 tablet (20 mg) by mouth once daily. 9/9/24 9/4/25 Yes Laila Alonso MD   lisinopril 40 mg tablet Take 1 tablet (40 mg) by mouth once daily.  Patient taking differently: Take 0.5 tablets (20 mg) by mouth once daily. 2/6/25 2/6/26 Yes [place] DO   magnesium glycinate 100 mg tablet Take 1 tablet by mouth once daily.   Yes Historical Provider, MD   metoprolol succinate XL (Toprol-XL) 50 mg 24 hr tablet Take 1 tablet (50 mg) by mouth once daily. Do not crush or chew.- has been taking 50 mg daily 9/9/24 9/9/25 Yes [place], DO   multivitamin with minerals tablet  Take 0.5 tablets by mouth once daily.   Yes Historical Provider, MD   omeprazole (PriLOSEC) 40 mg DR capsule Take 1 capsule (40 mg) by mouth 2 times a day before meals. Do not crush or chew. 2/19/25 3/24/25 Yes TRENTON Haji-CNP   turmeric root extract 500 mg capsule Take 1 capsule by mouth 2 times a day.   Yes Historical Provider, MD   zoledronic acid (Reclast) 5 mg/100 mL piggyback Infuse 100 mL (5 mg) at 400 mL/hr over 15 minutes into a venous catheter yearly. 3/14/25   Laila Alonso MD       This is my H&P    Physical Exam  Physical Exam  Constitutional:       Comments: Awake   HENT:      Head: Normocephalic.   Cardiovascular:      Rate and Rhythm: Normal rate and regular rhythm.   Pulmonary:      Effort: Pulmonary effort is normal.      Breath sounds: Normal breath sounds.   Abdominal:      General: Bowel sounds are normal.      Palpations: Abdomen is soft.   Neurological:      Mental Status: She is alert.   Psychiatric:         Mood and Affect: Mood normal.           Oropharyngeal Classification II (hard and soft palate, upper portion of tonsils and uvula visible)  ASA PS Classification 2  Sedation Plan Deep  Procedure Plan - pre-procedural (re)assesment completed by physician:  discharge/transfer patient when discharge criteria met    Sera Ng MD  3/24/2025 12:38 PM

## 2025-03-25 ENCOUNTER — TELEPHONE (OUTPATIENT)
Facility: CLINIC | Age: 70
End: 2025-03-25
Payer: COMMERCIAL

## 2025-03-25 NOTE — TELEPHONE ENCOUNTER
----- Message from Edward Adamson sent at 3/20/2025  9:00 AM EDT -----  Regarding: Results Review  Scoliosis xrays confirm significant disc disease below her prior surgery. Would require significant reconstructive surgery. I can see back in office if no improvement or worsening as last resort.  ----- Message -----  From: Interface, Radiology Results In  Sent: 3/19/2025   1:19 PM EDT  To: Edward Adamson MD

## 2025-03-27 ENCOUNTER — APPOINTMENT (OUTPATIENT)
Dept: PRIMARY CARE | Facility: CLINIC | Age: 70
End: 2025-03-27
Payer: COMMERCIAL

## 2025-04-01 ENCOUNTER — APPOINTMENT (OUTPATIENT)
Dept: PRIMARY CARE | Facility: CLINIC | Age: 70
End: 2025-04-01
Payer: COMMERCIAL

## 2025-04-01 VITALS — OXYGEN SATURATION: 96 % | HEART RATE: 76 BPM | DIASTOLIC BLOOD PRESSURE: 77 MMHG | SYSTOLIC BLOOD PRESSURE: 119 MMHG

## 2025-04-01 DIAGNOSIS — Z01.30 BLOOD PRESSURE CHECK: ICD-10-CM

## 2025-04-01 PROCEDURE — 3074F SYST BP LT 130 MM HG: CPT | Performed by: NURSE PRACTITIONER

## 2025-04-01 PROCEDURE — 1123F ACP DISCUSS/DSCN MKR DOCD: CPT | Performed by: NURSE PRACTITIONER

## 2025-04-01 PROCEDURE — 3078F DIAST BP <80 MM HG: CPT | Performed by: NURSE PRACTITIONER

## 2025-04-01 NOTE — PROGRESS NOTES
Bp check up.  Patient brought reading from- home readings reading mostly for systolic 128-140 diastolic  70-84

## 2025-05-01 ENCOUNTER — TELEPHONE (OUTPATIENT)
Dept: UROLOGY | Facility: CLINIC | Age: 70
End: 2025-05-01
Payer: COMMERCIAL

## 2025-05-01 NOTE — TELEPHONE ENCOUNTER
Pt left message asking to schedule appt. Hafsa, please reach out to pt to schedule her in July for her one year donnell garcia appt, thanks.

## 2025-05-13 ENCOUNTER — TELEPHONE (OUTPATIENT)
Dept: PRIMARY CARE | Facility: CLINIC | Age: 70
End: 2025-05-13

## 2025-05-13 ENCOUNTER — APPOINTMENT (OUTPATIENT)
Dept: CARDIOLOGY | Facility: CLINIC | Age: 70
End: 2025-05-13
Payer: COMMERCIAL

## 2025-05-13 VITALS
BODY MASS INDEX: 22.88 KG/M2 | HEART RATE: 82 BPM | HEIGHT: 58 IN | SYSTOLIC BLOOD PRESSURE: 142 MMHG | WEIGHT: 109 LBS | DIASTOLIC BLOOD PRESSURE: 92 MMHG

## 2025-05-13 DIAGNOSIS — I47.19 OTHER SUPRAVENTRICULAR TACHYCARDIA: ICD-10-CM

## 2025-05-13 DIAGNOSIS — Z76.89 ENCOUNTER TO ESTABLISH CARE WITH NEW PROVIDER: ICD-10-CM

## 2025-05-13 PROCEDURE — 3080F DIAST BP >= 90 MM HG: CPT | Performed by: INTERNAL MEDICINE

## 2025-05-13 PROCEDURE — 1159F MED LIST DOCD IN RCRD: CPT | Performed by: INTERNAL MEDICINE

## 2025-05-13 PROCEDURE — 93000 ELECTROCARDIOGRAM COMPLETE: CPT | Performed by: INTERNAL MEDICINE

## 2025-05-13 PROCEDURE — G2211 COMPLEX E/M VISIT ADD ON: HCPCS | Performed by: INTERNAL MEDICINE

## 2025-05-13 PROCEDURE — 99205 OFFICE O/P NEW HI 60 MIN: CPT | Performed by: INTERNAL MEDICINE

## 2025-05-13 PROCEDURE — 3077F SYST BP >= 140 MM HG: CPT | Performed by: INTERNAL MEDICINE

## 2025-05-13 PROCEDURE — 3008F BODY MASS INDEX DOCD: CPT | Performed by: INTERNAL MEDICINE

## 2025-05-13 PROCEDURE — 1036F TOBACCO NON-USER: CPT | Performed by: INTERNAL MEDICINE

## 2025-05-13 NOTE — PROGRESS NOTES
Referring Provider: Christofer Dalton MD  Reason for Consult: WPW    History of Present Illness:      Danielle Deshpande is a 70 y.o. year old female patient with a history significant for Mckeon Parkinson's White syndrome who is a former patient of Dr. Dalton is here to establish care.    She was originally diagnosed with the presence of a manifest accessory pathway in 2019 when she had severe nephrolithiasis.  After her urologic procedure, she had reportedly had tachycardia to the 180s.  Unsure if it was pretty excited or not.  At this time, she was diagnosed with WPW.  Since then, she has been managed on metoprolol, and has done relatively well.  Last year, she had worsening palpitations, so Dr. Dalton increased her metoprolol to 50 mg daily.  Since then, her symptoms have improved, but she continues to get palpitations every now and then.  Her episodes of palpitations usually last less than a few minutes.  Aside from palpitations, she denies any lightheadedness dizziness syncope chest pain, or other cardiac symptoms.  She still works on her feet.  She also has severe scoliosis and knee issues and is planning on knee surgery soon.    Focused Cardiovascular Problem List:  Felton-Parkinson-White syndrome: Nonsustained ORT noted on monitor, and 1 episode of sustained tachycardia per patient's report.  EKG shows an inferior septal pathway.        Past Medical and Surgical History:  Ms. Deshpande  has a past medical history of Acute recurrent sinusitis, unspecified (02/04/2022), Arthritis (2010), Autoimmune disorder (Multi), Colon polyp (1986), Heart disease, Hypertension (1993), Other signs and symptoms in breast (06/11/2014), Personal history of other diseases of the circulatory system (04/02/2014), Personal history of other diseases of the female genital tract (08/14/2020), Personal history of other diseases of the female genital tract (01/18/2022), Personal history of other diseases of the musculoskeletal system  and connective tissue (2014), Personal history of other specified conditions (2014), Personal history of other specified conditions (2020), PONV (postoperative nausea and vomiting) (), and Unspecified hydronephrosis (2020).    has a past surgical history that includes Colonoscopy (2014); Back surgery (2014); Other surgical history (2022); Other surgical history (2022); Breast biopsy (2014); and  section, low transverse ().    Social History:  Social History     Tobacco Use    Smoking status: Never    Smokeless tobacco: Never   Substance Use Topics    Alcohol use: Not Currently      Tobacco: Denies  Alcohol: Denies      Relevant Family History:   Family History[1]  No relevant family history     Allergies:  RX Allergies[2]     Medications:  Current Outpatient Medications   Medication Instructions    amitriptyline (ELAVIL) 10 mg, oral, Nightly    biotin 5,000 mcg tablet,disintegrating Take 1 tablet daily.    calcium carbonate (Oscal) 500 mg calcium (1,250 mg) tablet 1 tablet, Daily    cholecalciferol (Vitamin D-3) 50 mcg (2,000 unit) capsule Take 1 capsule daily    cranberry 500 mg capsule 1 capsule, Daily    docusate sodium (Colace) 100 mg capsule Take 1 capsule (100 mg) by mouth once daily.    estradiol (ESTRACE) 1 g, vaginal, 2 times weekly    glucosamine-chondroitin (Osteo Bi-Flex) 250-200 mg tablet 1 tablet, 2 times daily    hydroxychloroquine (PLAQUENIL) 200 mg, oral, Daily    leflunomide (ARAVA) 20 mg, oral, Daily    lisinopril 40 mg, oral, Daily    magnesium glycinate 100 mg tablet 1 tablet, Daily    metoprolol succinate XL (TOPROL-XL) 50 mg, oral, Daily, Do not crush or chew.- has been taking 50 mg daily    multivitamin with minerals tablet 0.5 tablets, Daily    omeprazole (PRILOSEC) 40 mg, oral, Daily before breakfast, Do not crush or chew.    turmeric root extract 500 mg capsule 1 capsule, 2 times daily    zoledronic acid  "(Reclast) 5 mg/100 mL piggyback 5 mg, intravenous, Yearly         Objective   Physical Exam:  Last Recorded Vitals:      3/24/2025    10:47 AM 3/24/2025    12:55 PM 3/24/2025     1:24 PM 3/24/2025     1:45 PM 4/1/2025     3:13 PM 4/1/2025     3:19 PM 5/13/2025     9:39 AM   Vitals   Systolic 167 113 110 113 137 119 142   Diastolic 84 59 64 71 83 77 92   BP Location     Left arm Left arm Left arm   Heart Rate 89 83 79 71 76  82   Temp 36.2 °C (97.2 °F) 36.9 °C (98.4 °F)        Resp 17 18 18 16      Height 1.473 m (4' 10\")      1.473 m (4' 10\")   Weight (lb) 109      109   BMI 22.78 kg/m2      22.78 kg/m2   BSA (m2) 1.42 m2      1.42 m2   Visit Report     Report Report Report    Visit Vitals  BP (!) 142/92 (BP Location: Left arm, Patient Position: Sitting)   Pulse 82   Ht 1.473 m (4' 10\")   Wt 49.4 kg (109 lb)   BMI 22.78 kg/m²   OB Status Postmenopausal   Smoking Status Never   BSA 1.42 m²      Gen: NAD, sitting comfortably  HEENT: NC/AT  Card: RRR, no m/r/g  Pulm: Clear to auscultation bilaterally  Ext: No LE edema  Neuro: No focal deficits    Diagnostic Results      My Independent nterpretation of Reviewed Study(s):  Prior ECGs (reviewed and my interpretation):   5/13/2025: Normal sinus rhythm, manifest preexcitation, heart rate 82 bpm    Cardiac Rhythm Monitors:  4/15/2024: 6 occurrences of nonsustained paroxysmal supraventricular tachycardia, the longest lasting 17 seconds      Echocardiography:  4/13/2024: LVEF 55 to 60%        Stress Test:   11/21/2019: Normal coronary circulation          Relevant Labs:  Lab Results   Component Value Date    CREATININE 0.72 03/10/2025    CREATININE 0.73 09/05/2024    CREATININE 0.72 08/05/2024    K 4.2 03/10/2025    K 3.7 09/05/2024    K 3.9 08/05/2024    HGBA1C 5.5 11/20/2019    HGBA1C 5.4 11/19/2019    HGB 12.8 03/10/2025    HGB 12.9 09/05/2024    HGB 12.9 04/30/2024    INR 1.0 01/10/2020    INR 1.0 11/19/2019    AST 23 03/10/2025    AST 22 09/05/2024    AST 23 04/30/2024    " ALT 22 03/10/2025    ALT 14 09/05/2024    ALT 15 04/30/2024       Assessment/Plan   Assessment and Plan:  Danielle Deshpande is a 70 y.o. year old female patient who is referred for management and evaluation of WPW with manifest preexcitation seen on her surface EKG.  Morphology of the preexcitation suggests an inferoseptal to mid septal pathway.  She has had episodes of tachycardia in the past, and has had more palpitations and tachycardia recently.    Given this, I think that we should further stratify her pathway in terms of risk.  I discussed possible options including EP study/ablation versus noninvasive restratification.  She would prefer noninvasive restratification at this time.  I think that would be reasonable especially given her age, the likelihood that this is a high risk pathway is lower.  Will have her exercise on a treadmill test and see if there is send loss of preexcitation on EKG.  If there is, this would be consistent with a low risk pathway.  If treadmill test does not suggest a low risk pathway, then would recommend moving forward with EPS/ablation.  Given the location of the pathway, may be close to the conduction system.    # WPW with manifest preexcitation on EKG  - 14-day patch monitor  - Treadmill EKG stress test to assess for sudden loss of preexcitation  - Continue metoprolol at current dose  - Consider EPS/ablation in the future for definitive restratification and possible ablation if patient is amenable      Return to Clinic: Patient should return to the EP Clinic in 2 months     Thank you very much for allowing me to participate in the care of this patient. Please do not hesitate to contact me with any further questions or concerns.    Lindy Glover MD  Clinical Cardiac Electrophysiologist, [facility] [place]    of Medicine, [employer]  Director of Atrial Fibrillation Ablation, [facility]  Director of Ventricular Arrhythmias Research, [facility]  Office Phone Number: [phone]              [1]   Family History  Problem Relation Name Age of Onset    Other (Non-Hodkins Lymphoma) Mother carmen Osborn     Thyroid disease Mother carmen Osborn     Hypertension Mother carmen Osborn     Emphysema Father      Cancer Father          Urinary Bladder    Stomach cancer Mother's Brother Ortiz Nolascoagatha    [2]   Allergies  Allergen Reactions    Diphenhydramine Hcl Other     Confusion     Levaquin [Levofloxacin] Other     Confusion    Novocain [Procaine] Other     Palpatations

## 2025-05-14 ENCOUNTER — APPOINTMENT (OUTPATIENT)
Dept: CARDIOLOGY | Facility: CLINIC | Age: 70
End: 2025-05-14
Payer: COMMERCIAL

## 2025-05-14 NOTE — TELEPHONE ENCOUNTER
Left voicemail requesting patient call back to schedule monitor appointment. Included call back number for [place] and [place] office since patient discussed having monitor put on at [place] office.

## 2025-05-15 ENCOUNTER — TELEPHONE (OUTPATIENT)
Dept: CARDIOLOGY | Facility: CLINIC | Age: 70
End: 2025-05-15
Payer: COMMERCIAL

## 2025-05-15 DIAGNOSIS — I47.19 OTHER SUPRAVENTRICULAR TACHYCARDIA: ICD-10-CM

## 2025-05-15 DIAGNOSIS — Z76.89 ENCOUNTER TO ESTABLISH CARE WITH NEW PROVIDER: ICD-10-CM

## 2025-05-15 DIAGNOSIS — I45.6 WOLFF-PARKINSON-WHITE (WPW) SYNDROME: ICD-10-CM

## 2025-05-15 DIAGNOSIS — R00.2 PALPITATIONS: ICD-10-CM

## 2025-05-15 NOTE — TELEPHONE ENCOUNTER
[facility] secure chat asking for order for GXT placed 5/13/25 to have additional diagnoses added for coverage.  Confirmed with Dr. Glover, may add palpitations, SVT, or Mckeon Parkinson White syndrome.  New order placed as old cannot be updated.  Sonja

## 2025-05-20 ENCOUNTER — APPOINTMENT (OUTPATIENT)
Dept: GASTROENTEROLOGY | Facility: CLINIC | Age: 70
End: 2025-05-20
Payer: COMMERCIAL

## 2025-05-20 VITALS
WEIGHT: 107.1 LBS | RESPIRATION RATE: 18 BRPM | HEIGHT: 58 IN | HEART RATE: 80 BPM | SYSTOLIC BLOOD PRESSURE: 134 MMHG | OXYGEN SATURATION: 97 % | BODY MASS INDEX: 22.48 KG/M2 | DIASTOLIC BLOOD PRESSURE: 84 MMHG

## 2025-05-20 DIAGNOSIS — K59.04 CHRONIC IDIOPATHIC CONSTIPATION: ICD-10-CM

## 2025-05-20 DIAGNOSIS — K27.9 PUD (PEPTIC ULCER DISEASE): Primary | ICD-10-CM

## 2025-05-20 PROCEDURE — 1159F MED LIST DOCD IN RCRD: CPT | Performed by: NURSE PRACTITIONER

## 2025-05-20 PROCEDURE — 3008F BODY MASS INDEX DOCD: CPT | Performed by: NURSE PRACTITIONER

## 2025-05-20 PROCEDURE — 1160F RVW MEDS BY RX/DR IN RCRD: CPT | Performed by: NURSE PRACTITIONER

## 2025-05-20 PROCEDURE — 1036F TOBACCO NON-USER: CPT | Performed by: NURSE PRACTITIONER

## 2025-05-20 PROCEDURE — 3079F DIAST BP 80-89 MM HG: CPT | Performed by: NURSE PRACTITIONER

## 2025-05-20 PROCEDURE — 99213 OFFICE O/P EST LOW 20 MIN: CPT | Performed by: NURSE PRACTITIONER

## 2025-05-20 PROCEDURE — 3075F SYST BP GE 130 - 139MM HG: CPT | Performed by: NURSE PRACTITIONER

## 2025-05-20 NOTE — PROGRESS NOTES
Subjective   Patient ID: Danielle Deshpande is a 70 y.o. female who presents for Peptic Ulcer Disease (3 month fuv PUD/chronic constipation/review procedure results. Pt reports she has been feeling better, states she has abdominal discomfort on the left side when she is stressed. Reports constipation is better as well.).  [facility] 2/19/25:  1-year-old female status post EGD in January, 2025 with findings of an antral ulcer and duodenitis.  Biopsies negative for H. pylori.  Taking PPI twice daily with good response to GERD symptoms.  Dysphagia has completely resolved with PPI, likely secondary to spasms from GERD.  Patient will continue empiric dosing of PPI until her EGD is repeated to check for healing.  Peptic ulcer disease likely secondary to Fosamax.  Gastroesophageal reflux disease without esophagitis  -     omeprazole (PriLOSEC) 40 mg DR capsule; Take 1 capsule (40 mg) by mouth 2 times a day before meals. Do not crush or chew.  Duodenitis  Chronic idiopathic constipation  Patient with chronic constipation, likely with pelvic floor dysfunction.  Colonoscopy is up-to-date and no red flag symptoms noted.  Recommend a bowel regimen, detailed instruction provided to the patient.  She will follow-up with me in 3 months to assess her response.  At that time if she continues to experience constipation we can escalate treatment to Linzess or Trulance and I will recommend pelvic floor therapy.         [facility] follow-up 5/20/2025:  Patient following up after repeat EGD with history of duodenitis and PUD.  She had been taking PPI twice daily.  Improvement of ulcer and no longer has duodenitis.  Now taking PPI daily.  No overt GI bleeding.  Weight and appetite are stable.  Left upper quadrant pain that waxes and wanes, but much improved.  This typically is worse with anxiety.  Working less, now down to 3 days weekly.  She is a  at [employer].  Constipation improved, but her bowel movements can vary from loose.  Has not started  Metamucil due to lack of water intake.    ->Colonoscopy 2023:  Scattered diverticulosis of moderate severity causing mild luminal narrowing in the descending colon and sigmoid colon  Hemorrhoids  Subcentimeter polyp in the sigmoid colon was removed with cold snare  ->EGD 3/2025: bx +for inflammation  The esophagus appeared normal.  Single ulcer in the antrum with clean base (Matt III); performed cold forceps biopsy  The duodenum appeared normal.  Diverticulosis in the 2nd part of the duodenum  2 cm type I hiatal hernia  Review of Systems   All other systems reviewed and are negative.      Objective   Physical Exam  Vitals reviewed.   Constitutional:       General: She is awake. She is not in acute distress.     Appearance: Normal appearance. She is well-developed and normal weight. She is not ill-appearing, toxic-appearing or diaphoretic.   HENT:      Head: Normocephalic and atraumatic.   Eyes:      Pupils: Pupils are equal, round, and reactive to light.   Neck:      Thyroid: No thyroid mass.      Trachea: Phonation normal.   Pulmonary:      Effort: Pulmonary effort is normal. No respiratory distress.      Breath sounds: Normal air entry. No decreased breath sounds.   Abdominal:      General: Bowel sounds are normal. There is no distension.      Palpations: Abdomen is soft.      Tenderness: There is no abdominal tenderness.   Musculoskeletal:      Cervical back: Neck supple.      Right lower leg: No edema.      Left lower leg: No edema.   Skin:     General: Skin is warm.      Coloration: Skin is not cyanotic, jaundiced or pale.   Neurological:      General: No focal deficit present.      Mental Status: She is alert and oriented to person, place, and time.      Cranial Nerves: Cranial nerves 2-12 are intact.      Motor: Motor function is intact. No weakness.      Gait: Gait abnormal (uses cane).   Psychiatric:         Attention and Perception: Attention and perception normal.         Mood and Affect: Mood normal.          Speech: Speech normal.         Behavior: Behavior normal. Behavior is cooperative.         Assessment/Plan   Diagnoses and all orders for this visit:  PUD (peptic ulcer disease)  70-year-old female status post repeat EGD with improved antral ulcer.  Recommend she continue daily PPI.  Duodenitis resolved.  No red flag symptoms noted.  Chronic idiopathic constipation  Chronic constipation likely multifactorial in nature, pelvic floor dysfunction, polypharmacy and lack of water and fiber in her diet.  Bowel movements have improved, but she continues to experience hard stools with alternating loose stools.  Strongly recommend she start fiber supplement due to history of diverticulosis and alternating bowel habits.  She does occasionally experience left upper quadrant pain, but this waxes and wanes and is worse with anxiety.  If her pain continues we will order CT of the abdomen and pelvis.  She will follow-up with me in 1 year, sooner if needed.         Anna Silva, TRENTON-CNP 05/20/25 9:50 AM

## 2025-05-22 ENCOUNTER — HOSPITAL ENCOUNTER (OUTPATIENT)
Dept: CARDIOLOGY | Facility: CLINIC | Age: 70
Discharge: HOME | End: 2025-05-22
Payer: MEDICARE

## 2025-05-22 ENCOUNTER — APPOINTMENT (OUTPATIENT)
Dept: CARDIOLOGY | Facility: CLINIC | Age: 70
End: 2025-05-22
Payer: MEDICARE

## 2025-05-22 DIAGNOSIS — I45.6 PRE-EXCITATION SYNDROME: ICD-10-CM

## 2025-05-22 DIAGNOSIS — Z79.60 LONG-TERM USE OF IMMUNOSUPPRESSANT MEDICATION: ICD-10-CM

## 2025-05-22 DIAGNOSIS — I47.19 OTHER SUPRAVENTRICULAR TACHYCARDIA: ICD-10-CM

## 2025-05-22 DIAGNOSIS — Z79.899 LONG-TERM USE OF PLAQUENIL: ICD-10-CM

## 2025-05-22 DIAGNOSIS — M06.09 RHEUMATOID ARTHRITIS OF MULTIPLE SITES WITH NEGATIVE RHEUMATOID FACTOR (MULTI): ICD-10-CM

## 2025-05-22 DIAGNOSIS — I47.10 SUPRAVENTRICULAR TACHYCARDIA: ICD-10-CM

## 2025-05-22 DIAGNOSIS — M85.852 OSTEOPENIA OF NECK OF LEFT FEMUR: ICD-10-CM

## 2025-05-22 DIAGNOSIS — M41.9 KYPHOSCOLIOSIS: ICD-10-CM

## 2025-05-22 DIAGNOSIS — M15.0 PRIMARY OSTEOARTHRITIS INVOLVING MULTIPLE JOINTS: ICD-10-CM

## 2025-05-22 DIAGNOSIS — R00.2 PALPITATIONS: ICD-10-CM

## 2025-05-22 DIAGNOSIS — Z76.89 ENCOUNTER TO ESTABLISH CARE WITH NEW PROVIDER: ICD-10-CM

## 2025-05-22 DIAGNOSIS — M11.20 CHONDROCALCINOSIS: ICD-10-CM

## 2025-05-22 DIAGNOSIS — Z79.69 ON LONG TERM LEFLUNOMIDE THERAPY: ICD-10-CM

## 2025-05-22 DIAGNOSIS — G57.93 NEUROPATHY INVOLVING BOTH LOWER EXTREMITIES: ICD-10-CM

## 2025-05-22 DIAGNOSIS — E55.9 VITAMIN D DEFICIENCY: ICD-10-CM

## 2025-05-22 DIAGNOSIS — M06.9 RHEUMATOID ARTHRITIS INVOLVING MULTIPLE SITES, UNSPECIFIED WHETHER RHEUMATOID FACTOR PRESENT (MULTI): ICD-10-CM

## 2025-05-22 PROCEDURE — 93018 CV STRESS TEST I&R ONLY: CPT | Performed by: INTERNAL MEDICINE

## 2025-05-22 PROCEDURE — 93017 CV STRESS TEST TRACING ONLY: CPT

## 2025-05-22 PROCEDURE — 93016 CV STRESS TEST SUPVJ ONLY: CPT | Performed by: INTERNAL MEDICINE

## 2025-05-23 RX ORDER — HYDROXYCHLOROQUINE SULFATE 200 MG/1
TABLET, FILM COATED ORAL DAILY
Qty: 90 TABLET | Refills: 1 | Status: SHIPPED | OUTPATIENT
Start: 2025-05-23

## 2025-05-28 ENCOUNTER — TELEPHONE (OUTPATIENT)
Dept: UROLOGY | Facility: CLINIC | Age: 70
End: 2025-05-28
Payer: COMMERCIAL

## 2025-05-28 NOTE — TELEPHONE ENCOUNTER
Pt left message stating she has her annual pessary fu appt scheduled in July but in the last week or so she is experiencing tenderness in her vagina with the pessary and it feels like it is rubbing on her one side. She states she takes it out every night to clean it and uses estradiol cream 2 x week. She is asking if there is something else she can do or if she needs to be seen sooner. Please advise, thanks.

## 2025-05-30 ENCOUNTER — APPOINTMENT (OUTPATIENT)
Dept: CARDIOLOGY | Facility: CLINIC | Age: 70
End: 2025-05-30
Payer: COMMERCIAL

## 2025-06-02 ENCOUNTER — APPOINTMENT (OUTPATIENT)
Dept: UROLOGY | Facility: CLINIC | Age: 70
End: 2025-06-02
Payer: MEDICARE

## 2025-06-02 VITALS
WEIGHT: 109 LBS | TEMPERATURE: 97.8 F | HEART RATE: 81 BPM | BODY MASS INDEX: 21.97 KG/M2 | DIASTOLIC BLOOD PRESSURE: 89 MMHG | HEIGHT: 59 IN | SYSTOLIC BLOOD PRESSURE: 150 MMHG

## 2025-06-02 DIAGNOSIS — N95.8 GENITOURINARY SYNDROME OF MENOPAUSE: ICD-10-CM

## 2025-06-02 DIAGNOSIS — N39.46 MIXED INCONTINENCE URGE AND STRESS: Primary | ICD-10-CM

## 2025-06-02 DIAGNOSIS — N81.4 CYSTOCELE WITH PROLAPSE: ICD-10-CM

## 2025-06-02 DIAGNOSIS — M62.89 HIGH-TONE PELVIC FLOOR DYSFUNCTION: ICD-10-CM

## 2025-06-02 PROCEDURE — 1036F TOBACCO NON-USER: CPT | Performed by: NURSE PRACTITIONER

## 2025-06-02 PROCEDURE — 1159F MED LIST DOCD IN RCRD: CPT | Performed by: NURSE PRACTITIONER

## 2025-06-02 PROCEDURE — A4562 PESSARY, NON RUBBER,ANY TYPE: HCPCS | Performed by: NURSE PRACTITIONER

## 2025-06-02 PROCEDURE — 3077F SYST BP >= 140 MM HG: CPT | Performed by: NURSE PRACTITIONER

## 2025-06-02 PROCEDURE — 99214 OFFICE O/P EST MOD 30 MIN: CPT | Performed by: NURSE PRACTITIONER

## 2025-06-02 PROCEDURE — 3008F BODY MASS INDEX DOCD: CPT | Performed by: NURSE PRACTITIONER

## 2025-06-02 PROCEDURE — 3079F DIAST BP 80-89 MM HG: CPT | Performed by: NURSE PRACTITIONER

## 2025-06-02 RX ORDER — ESTRADIOL 0.1 MG/G
1 CREAM VAGINAL 2 TIMES WEEKLY
Qty: 42.5 G | Refills: 5 | Status: SHIPPED | OUTPATIENT
Start: 2025-06-02

## 2025-06-02 ASSESSMENT — PATIENT HEALTH QUESTIONNAIRE - PHQ9
1. LITTLE INTEREST OR PLEASURE IN DOING THINGS: NOT AT ALL
2. FEELING DOWN, DEPRESSED OR HOPELESS: NOT AT ALL
SUM OF ALL RESPONSES TO PHQ9 QUESTIONS 1 AND 2: 0

## 2025-06-02 NOTE — PATIENT INSTRUCTIONS
Inserted ring pessary  R 3.00#5  Lot [id number]  Dispensed pessary today without support, sent old pessary home  Hopeful will be easier to remove  Pelvic floor physical therapy left pelvic pain with pessary, scoliosis, fusion, hip, knee issues worsening pelvis    Follow up 3 mos  Silvia

## 2025-06-02 NOTE — PROGRESS NOTES
"06/02/25   [id number]    Pessary check, having pain, difficulty with removal     Subjective      HPI Danielle Deshpande is a 70 y.o. female who presents for pessary check; Last seen 7/2/24 cleaning pessary per self w  assist for hx stage 4 anterior prolapse, VIVIANA on UDS; having difficulty with removal and some discomfort in vagina; had been more regular with vaginal estrogen, trying to get back on schedule; more knee issues and scoliosis worsening, on exam, hips uneven and increase tone left vaginal wall; discussed options with vaginal estrogen, consider intrarosa, but we decided to try pessary ring without support to see if easier to remove and insert first;     hx nephrolithiasis managed by Dr. Alejandro, due for repeat imaging August 2025; has follow up already planned with Dr. Martinez;    PMH, PSH, FH, SH reviewed  WPW has cardiac monitor on today to  eval if will proceed with pacemaker or ablation    Objective     /89   Pulse 81   Temp 36.6 °C (97.8 °F)   Ht 1.486 m (4' 10.5\")   Wt 49.4 kg (109 lb)   BMI 22.39 kg/m²    Physical Exam  Genitourinary:     Comments: No erosion today noted; pessary removed, cleaned and sent home, inserted new ring pessary without support, good fit; high tone pelvic floor left, stage II cystocele, small rectocele;           General: Appears comfortable and in no apparent distress, well nourished  Head: Normocephalic, atraumatic  Neck: trachea midline  Respiratory: respirations unlabored, no wheezes, and no use of accessory muscles  Cardiovascular: at rest no dyspnea, well perfused  Skin: no visible rashes or lesions  Neurologic: grossly intact, oriented to person, place, and time  Psychiatric: mood and affect appropriate  Musculoskeletal: in chair for appt. no difficulty w upper body movement        Assessment/Plan   Problem List Items Addressed This Visit          Genitourinary and Reproductive    Mixed incontinence urge and stress - Primary    Relevant Orders    " Referral to Physical Therapy     Other Visit Diagnoses         Cystocele with prolapse        Relevant Orders    Referral to Physical Therapy      High-tone pelvic floor dysfunction        Relevant Orders    Referral to Physical Therapy      Genitourinary syndrome of menopause        Relevant Medications    estradiol (Estrace) 0.01 % (0.1 mg/gram) vaginal cream              Orders Placed This Encounter   Procedures    Referral to Physical Therapy     Standing Status:   Future     Expected Date:   6/2/2025     Expiration Date:   6/2/2026     Referral Priority:   Routine     Referral Type:   Consultation     Referral Reason:   Specialty Services Required     Referred to Provider:   Karma Chapman PT     Requested Specialty:   Physical Therapy     Number of Visits Requested:   1   #Prolapse/pelvic pain  Inserted ring pessary  R 3.00#5  Lot [id number]  Dispensed pessary today without support, sent old pessary home  Hopeful will be easier to remove    Pelvic floor physical therapy left pelvic pain with pessary, scoliosis, fusion, hip, knee issues worsening pelvis    #nephrolithiasis  Seeing Dr. Martinez in August as Dr. Alejandro has moved to different office    Follow up 3 mos  Silvia   Nurse line [phone]       TRENTON Cordon-CNP  Lab Results   Component Value Date    GLUCOSE 77 03/10/2025    CALCIUM 9.1 03/10/2025     03/10/2025    K 4.2 03/10/2025    CO2 31 03/10/2025     03/10/2025    BUN 23 03/10/2025    CREATININE 0.72 03/10/2025

## 2025-06-16 PROCEDURE — 93248 EXT ECG>7D<15D REV&INTERPJ: CPT | Performed by: INTERNAL MEDICINE

## 2025-06-20 ENCOUNTER — SPECIALTY PHARMACY (OUTPATIENT)
Dept: PHARMACY | Facility: CLINIC | Age: 70
End: 2025-06-20

## 2025-06-24 ASSESSMENT — ENCOUNTER SYMPTOMS
HEADACHES: 0
PALPITATIONS: 0
PND: 0
ORTHOPNEA: 0
SWEATS: 0
BLURRED VISION: 0
SHORTNESS OF BREATH: 0
HYPERTENSION: 1
NECK PAIN: 0

## 2025-06-26 ENCOUNTER — APPOINTMENT (OUTPATIENT)
Dept: PRIMARY CARE | Facility: CLINIC | Age: 70
End: 2025-06-26
Payer: MEDICARE

## 2025-06-26 VITALS
WEIGHT: 108 LBS | BODY MASS INDEX: 21.77 KG/M2 | RESPIRATION RATE: 16 BRPM | OXYGEN SATURATION: 97 % | SYSTOLIC BLOOD PRESSURE: 128 MMHG | HEART RATE: 82 BPM | DIASTOLIC BLOOD PRESSURE: 78 MMHG | HEIGHT: 59 IN

## 2025-06-26 DIAGNOSIS — M81.0 OSTEOPOROSIS, POST-MENOPAUSAL: ICD-10-CM

## 2025-06-26 DIAGNOSIS — I05.1 RHEUMATIC MITRAL REGURGITATION: ICD-10-CM

## 2025-06-26 DIAGNOSIS — Z12.31 BREAST CANCER SCREENING BY MAMMOGRAM: ICD-10-CM

## 2025-06-26 DIAGNOSIS — Z00.00 WELL ADULT HEALTH CHECK: Primary | ICD-10-CM

## 2025-06-26 DIAGNOSIS — G43.909 MIGRAINE WITHOUT STATUS MIGRAINOSUS, NOT INTRACTABLE, UNSPECIFIED MIGRAINE TYPE: ICD-10-CM

## 2025-06-26 DIAGNOSIS — I45.6 WOLFF-PARKINSON-WHITE (WPW) SYNDROME: ICD-10-CM

## 2025-06-26 DIAGNOSIS — I10 BENIGN ESSENTIAL HYPERTENSION: ICD-10-CM

## 2025-06-26 PROCEDURE — 1160F RVW MEDS BY RX/DR IN RCRD: CPT | Performed by: INTERNAL MEDICINE

## 2025-06-26 PROCEDURE — 1036F TOBACCO NON-USER: CPT | Performed by: INTERNAL MEDICINE

## 2025-06-26 PROCEDURE — 1126F AMNT PAIN NOTED NONE PRSNT: CPT | Performed by: INTERNAL MEDICINE

## 2025-06-26 PROCEDURE — 3008F BODY MASS INDEX DOCD: CPT | Performed by: INTERNAL MEDICINE

## 2025-06-26 PROCEDURE — 3074F SYST BP LT 130 MM HG: CPT | Performed by: INTERNAL MEDICINE

## 2025-06-26 PROCEDURE — 1159F MED LIST DOCD IN RCRD: CPT | Performed by: INTERNAL MEDICINE

## 2025-06-26 PROCEDURE — 99214 OFFICE O/P EST MOD 30 MIN: CPT | Performed by: INTERNAL MEDICINE

## 2025-06-26 PROCEDURE — 3078F DIAST BP <80 MM HG: CPT | Performed by: INTERNAL MEDICINE

## 2025-06-26 RX ORDER — IBUPROFEN 200 MG
600 TABLET ORAL EVERY 6 HOURS
Qty: 60 TABLET | Refills: 1 | Status: SHIPPED | OUTPATIENT
Start: 2025-06-26

## 2025-06-26 RX ORDER — AMITRIPTYLINE HYDROCHLORIDE 10 MG/1
10 TABLET, FILM COATED ORAL NIGHTLY
Qty: 90 TABLET | Refills: 3 | Status: SHIPPED | OUTPATIENT
Start: 2025-06-26 | End: 2026-06-26

## 2025-06-26 RX ORDER — LISINOPRIL 40 MG/1
40 TABLET ORAL DAILY
Qty: 90 TABLET | Refills: 3 | Status: SHIPPED | OUTPATIENT
Start: 2025-06-26 | End: 2026-06-26

## 2025-06-26 ASSESSMENT — ENCOUNTER SYMPTOMS
PALPITATIONS: 0
HEADACHES: 0
SHORTNESS OF BREATH: 0
NECK PAIN: 0

## 2025-06-26 ASSESSMENT — PAIN SCALES - GENERAL: PAINLEVEL_OUTOF10: 0-NO PAIN

## 2025-06-26 NOTE — PROGRESS NOTES
Subjective   Danielle Deshpande is a 70 y.o. female who presents for Follow-up (Blood pressure- has not been checking BP at home, reports that she was previously and readings have been normal/Medication Lisinopril increased to 40 mg in February ).      History of Present Illness  The patient presents for evaluation of migraines, hypertension, and Felton-Parkinson-White syndrome.    Migraines and Associated Symptoms  She has been experiencing episodes of dizziness, vertigo, and migraine-like symptoms, predominantly on the left side. These symptoms have been occurring at least twice a week, sometimes even twice a day. She describes a sensation of pressure in her sinus area, particularly affecting her left ear, which often feels congested and uncomfortable. She also reports a quivering sensation on the left side of her face. She has been using Flonase daily to manage her allergies. She consulted with Pam Curry, yo[place] PA at [facility], via virtual visit approximately a month ago. Her amitriptyline dosage was increased from 10 mg to 20 mg during this visit. She was initially diagnosed with BPPV several years ago following a prolonged episode of dizziness lasting several months. Despite undergoing therapy and a brain CT scan, her symptoms persisted. She has a long-standing history of sinus issues dating back to her early 20s. She has not tried any triptans such as rizatriptan or Maxalt for her migraines. She rarely takes ibuprofen and cannot recall the last time she used Tylenol. She was prescribed amitriptyline, which she has been taking for her migraines.  - Onset: Episodes of dizziness, vertigo, and migraine-like symptoms occurring at least twice a week, sometimes twice a day.  - Location: Predominantly on the left side; pressure in sinus area affecting left ear; quivering sensation on the left side of the face.  - Duration: Long-standing history of sinus issues dating back to early 20s; persistent  "symptoms despite therapy and brain CT scan.  - Character: Dizziness, vertigo, migraine-like symptoms, pressure in sinus area, congestion in left ear, quivering sensation on the left side of the face.  - Alleviating/Aggravating Factors: Using Flonase daily; amitriptyline dosage increased from 10 mg to 20 mg; has not tried triptans; rarely takes ibuprofen or Tylenol.  - Timing: Symptoms occurring at least twice a week, sometimes twice a day.  - Severity: Persistent symptoms despite therapy and brain CT scan.    Hypertension and Felton-Parkinson-White Syndrome  She has been under the care of Dr. Glover, a cardiologist, since her previous visit. A 2-week Holter monitor test was conducted, and she underwent a stress test. Her blood pressure readings have been within normal limits. She is scheduled to see Dr. Glover next Tuesday for a review of her test results. Her lisinopril dosage was increased to 40 mg, but no subsequent blood work has been performed since the adjustment.  - Onset: Under the care of Dr. Glover since previous visit.  - Duration: Ongoing care and monitoring.  - Character: Blood pressure readings within normal limits; lisinopril dosage increased to 40 mg.  - Timing: Scheduled to see Dr. Glover next Tuesday for review of test results.    She has not yet started Reclast treatment due to concerns about potential side effects.    She has been getting mammograms yearly.    FAMILY HISTORY  Her mother had lymphoma.      Review of Systems   Respiratory:  Negative for shortness of breath.    Cardiovascular:  Negative for chest pain and palpitations.   Musculoskeletal:  Negative for neck pain.   Neurological:  Negative for headaches.       Objective   /78   Pulse 82   Resp 16   Ht (!) 1.486 m (4' 10.5\")   Wt 49 kg (108 lb)   SpO2 97%   BMI 22.19 kg/m²       Physical Exam  Constitutional:       Appearance: Normal appearance.   HENT:      Head: Normocephalic.   Eyes:      Conjunctiva/sclera: Conjunctivae " normal.   Cardiovascular:      Rate and Rhythm: Normal rate and regular rhythm.      Heart sounds: Normal heart sounds.   Pulmonary:      Effort: Pulmonary effort is normal.      Breath sounds: Normal breath sounds.   Musculoskeletal:      Cervical back: Neck supple.   Skin:     General: Skin is warm and dry.   Neurological:      Mental Status: She is alert.         Assessment & Plan  Well adult health check    Orders:    Lipid Panel; Future    TSH with reflex to Free T4 if abnormal; Future    Comprehensive Metabolic Panel; Future    CBC; Future    Breast cancer screening by mammogram    Orders:    BI mammo bilateral screening tomosynthesis; Future    Migraine without status migrainosus, not intractable, unspecified migraine type    Orders:    ibuprofen 200 mg tablet; Take 3 tablets (600 mg) by mouth every 6 hours.    amitriptyline (Elavil) 10 mg tablet; Take 1 tablet (10 mg) by mouth once daily at bedtime.    Felton-Parkinson-White (WPW) syndrome    Orders:    CBC; Future    Benign essential hypertension    Orders:    CBC; Future    lisinopril 40 mg tablet; Take 1 tablet (40 mg) by mouth once daily.    Rheumatic mitral regurgitation    Orders:    lisinopril 40 mg tablet; Take 1 tablet (40 mg) by mouth once daily.    Osteoporosis, post-menopausal            Assessment & Plan  1. Felton-Parkinson-White syndrome: Stable. EKG shows an inferior septal pathway, one episode of sustained tachycardia per patient's report.  - Continue metoprolol.  - Consider ablation as discussed with Dr. Glover.  - Electrolyte panel will be ordered to monitor kidney function due to the recent increase in lisinopril to 40 mg.    2. Hypertension: Stable.  - Continue taking lisinopril 40 mg daily.  - Annual blood work will be ordered to monitor electrolytes and kidney function.    3. Migraines: Chronic.  - Reduce amitriptyline back to 10 mg at night due to arrhythmia concerns.  - Use Advil (ibuprofen) 600 mg up to three times a day as needed for  migraine relief.  - Inform Dr. Glover about the amitriptyline adjustment.    4. Health maintenance.  - Annual blood work will be ordered, including cholesterol levels.  - Continue regular mammograms and follow up with the next scheduled appointment.  - Reclast discussed but not yet started due to concerns about side effects.    Follow-up  - Follow up in 3 months.      [place], DO   This medical note was created with the assistance of artificial intelligence (AI) for documentation purposes. The content has been reviewed and confirmed by the healthcare provider for accuracy and completeness. Patient consented to the use of audio recording and use of AI during their visit.

## 2025-06-26 NOTE — ASSESSMENT & PLAN NOTE
Orders:    ibuprofen 200 mg tablet; Take 3 tablets (600 mg) by mouth every 6 hours.    amitriptyline (Elavil) 10 mg tablet; Take 1 tablet (10 mg) by mouth once daily at bedtime.

## 2025-07-01 ENCOUNTER — APPOINTMENT (OUTPATIENT)
Dept: CARDIOLOGY | Facility: CLINIC | Age: 70
End: 2025-07-01
Payer: MEDICARE

## 2025-07-01 VITALS
HEIGHT: 59 IN | HEART RATE: 82 BPM | WEIGHT: 108 LBS | DIASTOLIC BLOOD PRESSURE: 76 MMHG | SYSTOLIC BLOOD PRESSURE: 126 MMHG | BODY MASS INDEX: 21.77 KG/M2

## 2025-07-01 DIAGNOSIS — I45.6 WPW (WOLFF-PARKINSON-WHITE SYNDROME): Primary | ICD-10-CM

## 2025-07-01 PROCEDURE — 1159F MED LIST DOCD IN RCRD: CPT | Performed by: INTERNAL MEDICINE

## 2025-07-01 PROCEDURE — 3008F BODY MASS INDEX DOCD: CPT | Performed by: INTERNAL MEDICINE

## 2025-07-01 PROCEDURE — 1036F TOBACCO NON-USER: CPT | Performed by: INTERNAL MEDICINE

## 2025-07-01 PROCEDURE — 3074F SYST BP LT 130 MM HG: CPT | Performed by: INTERNAL MEDICINE

## 2025-07-01 PROCEDURE — 3078F DIAST BP <80 MM HG: CPT | Performed by: INTERNAL MEDICINE

## 2025-07-01 PROCEDURE — 99214 OFFICE O/P EST MOD 30 MIN: CPT | Performed by: INTERNAL MEDICINE

## 2025-07-01 PROCEDURE — G2211 COMPLEX E/M VISIT ADD ON: HCPCS | Performed by: INTERNAL MEDICINE

## 2025-07-01 NOTE — PROGRESS NOTES
Referring Provider: Christofer Dalton MD  Reason for Consult: WPW    History of Present Illness:      Danielle Deshpande is a 70 y.o. year old female patient with a history significant for Mckeon Parkinson's White syndrome who is a former patient of Dr. Dalton is here to establish care.    She was originally diagnosed with the presence of a manifest accessory pathway in 2019 when she had severe nephrolithiasis.  After her urologic procedure, she had reportedly had tachycardia to the 180s.  Unsure if it was pretty excited or not.  At this time, she was diagnosed with WPW.  Since then, she has been managed on metoprolol, and has done relatively well.  Last year, she had worsening palpitations, so Dr. Dalton increased her metoprolol to 50 mg daily.  Since then, her symptoms have improved, but she continues to get palpitations every now and then.  Her episodes of palpitations usually last less than a few minutes.  Aside from palpitations, she denies any lightheadedness dizziness syncope chest pain, or other cardiac symptoms.  She still works on her feet.  She also has severe scoliosis and knee issues and is planning on knee surgery soon.    Since her last visit, we obtained a cardiac monitor.  This showed episodes of wide-complex tachycardia, that were nonsustained, longest lasting 18 seconds.  Stress test showed subtle loss of preexcitation with exertion.  Continue to have palpitations and tachycardia.  She was also recently started on amitriptyline for migraines.    Focused Cardiovascular Problem List:  Felton-Parkinson-White syndrome: Nonsustained ORT noted on monitor, and 1 episode of sustained tachycardia per patient's report.  EKG shows an inferior septal pathway.        Past Medical and Surgical History:  Ms. Deshpande  has a past medical history of Abnormal ECG (2019), Acute recurrent sinusitis, unspecified (02/04/2022), Arthritis (2010), Autoimmune disorder (Multi), Colon polyp (1986), Heart disease,  Hypertension (), Other signs and symptoms in breast (2014), Personal history of other diseases of the circulatory system (2014), Personal history of other diseases of the female genital tract (2020), Personal history of other diseases of the female genital tract (2022), Personal history of other diseases of the musculoskeletal system and connective tissue (2014), Personal history of other specified conditions (2014), Personal history of other specified conditions (2020), PONV (postoperative nausea and vomiting) (), and Unspecified hydronephrosis (2020).    has a past surgical history that includes Colonoscopy (2014); Back surgery (2014); Other surgical history (2022); Other surgical history (2022); Breast biopsy (2014);  section, low transverse (); and Tubal ligation ().    Social History:  Social History     Tobacco Use    Smoking status: Never    Smokeless tobacco: Never   Substance Use Topics    Alcohol use: Not Currently      Tobacco: Denies  Alcohol: Denies      Relevant Family History:   Family History[1]  No relevant family history     Allergies:  RX Allergies[2]     Medications:  Current Outpatient Medications   Medication Instructions    amitriptyline (ELAVIL) 10 mg, oral, Nightly    biotin 5,000 mcg tablet,disintegrating Take 1 tablet daily.    calcium carbonate (Oscal) 500 mg calcium (1,250 mg) tablet 1 tablet, Daily    cholecalciferol (Vitamin D-3) 50 mcg (2,000 unit) capsule Take 1 capsule daily    cranberry 500 mg capsule 1 capsule, Daily    docusate sodium (Colace) 100 mg capsule Take 1 capsule (100 mg) by mouth once daily.    estradiol (ESTRACE) 1 g, vaginal, 2 times weekly    glucosamine-chondroitin (Osteo Bi-Flex) 250-200 mg tablet 1 tablet, 2 times daily    hydroxychloroquine (Plaquenil) 200 mg tablet oral, Daily    ibuprofen 600 mg, oral, Every 6 hours    leflunomide (ARAVA) 20 mg,  "oral, Daily    lisinopril 40 mg, oral, Daily    magnesium glycinate 100 mg tablet 1 tablet, Daily    metoprolol succinate XL (TOPROL-XL) 50 mg, oral, Daily, Do not crush or chew.- has been taking 50 mg daily    multivitamin with minerals tablet 0.5 tablets, Daily    omeprazole (PRILOSEC) 40 mg, oral, Daily before breakfast, Do not crush or chew.    turmeric root extract 500 mg capsule 1 capsule, 2 times daily    zoledronic acid (Reclast) 5 mg/100 mL piggyback 5 mg, intravenous, Yearly         Objective   Physical Exam:  Last Recorded Vitals:      4/1/2025     3:13 PM 4/1/2025     3:19 PM 5/13/2025     9:39 AM 5/20/2025     9:30 AM 6/2/2025     8:44 AM 6/26/2025     2:38 PM 7/1/2025    10:07 AM   Vitals   Systolic 137 119 142 134 150 128 126   Diastolic 83 77 92 84 89 78 76   BP Location Left arm Left arm Left arm Left arm   Left arm   Heart Rate 76  82 80 81 82 82   Temp     36.6 °C (97.8 °F)     Resp    18  16    Height   1.473 m (4' 10\") 1.473 m (4' 10\") 1.486 m (4' 10.5\") 1.486 m (4' 10.5\") 1.486 m (4' 10.5\")   Weight (lb)   109 107.1 109 108 108   BMI   22.78 kg/m2 22.38 kg/m2 22.39 kg/m2 22.19 kg/m2 22.19 kg/m2   BSA (m2)   1.42 m2 1.41 m2 1.43 m2 1.42 m2 1.42 m2   Visit Report Report Report Report Report Report Report Report    Visit Vitals  /76 (BP Location: Left arm, Patient Position: Sitting)   Pulse 82   Ht (!) 1.486 m (4' 10.5\")   Wt 49 kg (108 lb)   BMI 22.19 kg/m²   OB Status Postmenopausal   Smoking Status Never   BSA 1.42 m²      Gen: NAD, sitting comfortably  HEENT: NC/AT  Card: RRR, no m/r/g  Pulm: Clear to auscultation bilaterally  Ext: No LE edema  Neuro: No focal deficits    Diagnostic Results      My Independent nterpretation of Reviewed Study(s):  Prior ECGs (reviewed and my interpretation):   5/13/2025: Normal sinus rhythm, manifest preexcitation, heart rate 82 bpm    Cardiac Rhythm Monitors:  5/22/2025: Abnormal 14-day monitor which showed episodes of wide-complex tachycardia, longest " lasting 18 seconds. Given her history of WPW, as well as the variable morphology of these wide-complex beats, this likely represents nonsustained pre-excited supraventricular tachycardia.   4/15/2024: 6 occurrences of nonsustained paroxysmal supraventricular tachycardia, the longest lasting 17 seconds      Echocardiography:  4/13/2024: LVEF 55 to 60%        Stress Test:  5/22/2025: Exaggerated heart response to exercise, loss of preexcitation with exertion  11/21/2019: Normal coronary circulation          Relevant Labs:  Lab Results   Component Value Date    CREATININE 0.72 03/10/2025    CREATININE 0.73 09/05/2024    CREATININE 0.72 08/05/2024    K 4.2 03/10/2025    K 3.7 09/05/2024    K 3.9 08/05/2024    HGBA1C 5.5 11/20/2019    HGBA1C 5.4 11/19/2019    HGB 12.8 03/10/2025    HGB 12.9 09/05/2024    HGB 12.9 04/30/2024    INR 1.0 01/10/2020    INR 1.0 11/19/2019    AST 23 03/10/2025    AST 22 09/05/2024    AST 23 04/30/2024    ALT 22 03/10/2025    ALT 14 09/05/2024    ALT 15 04/30/2024       Assessment/Plan   Assessment and Plan:  Danielle Deshpande is a 70 y.o. year old female patient who is referred for management and evaluation of WPW with manifest preexcitation seen on her surface EKG.  Morphology of the preexcitation suggests an inferoseptal to mid septal pathway.  She has had episodes of tachycardia in the past, and has had more palpitations and tachycardia recently.    Ambulatory monitoring showed episodes of wide-complex tachycardia that may be preexcited nonsustained ART.  Stress test did show subtle loss of preexcitation, but given her increasing palpitations and episodes of wide-complex tachycardia on monitoring, I would like to invasively her stratify her pathway and consider ablation.  I would like to do this on a sooner basis, but the patient would like to wait until her daughter delivers which is sometime at the end of August beginning of September.  She understands the risks of waiting which include a  small chance of syncope, and sudden cardiac death.    The risks of the procedure were discussed in detail, including but not limited to infection, blood vessel damage, heart injury or perforation necessitating emergency cardiac surgery, heart attack or stroke, need for pacemaker, even death.        # WPW with manifest preexcitation on EKG  - Plan for EPS/ablation at the end of September  - Stop amitriptyline due to risk of arrhythmias    Name: Danielle Deshpande  MRN: [id number]  Attending: Lindy Glover MD  Procedure: EPS/WPW ablation  Date desired: End of September  Diagnosis: WPW  Vendor if applicable: [email or web address]  Expected anesthesia: Consult  Isolation/precautions?:  None  Other comments/med instructions: Type and screen within 3 weeks of the procedure.  N.p.o. at midnight for the procedure.  Hold all other medications on the morning of the procedure.            Wants ablation sometime in September        Return to Clinic: Patient should return to the EP Clinic in 2 months     Thank you very much for allowing me to participate in the care of this patient. Please do not hesitate to contact me with any further questions or concerns.    Lindy Glover MD  Clinical Cardiac Electrophysiologist, [facility] [place]    of Medicine, [facility] [employer]  Director of Atrial Fibrillation Ablation, [facility]  Director of Ventricular Arrhythmias Research, [facility]  Office Phone Number: [phone]              [1]   Family History  Problem Relation Name Age of Onset    Other (Non-Hodkins Lymphoma) Mother carmen Osborn     Thyroid disease Mother carmen Osborn     Hypertension Mother carmen Osborn     Emphysema Father      Cancer Father          Urinary Bladder    Stomach cancer Mother's Brother Ortiz Suh     Stroke Mother carmen Osborn     Migraines Maternal Grandmother nancy Suh     Migraines  Maternal Grandmother nancy Suh     Migraines Maternal Grandmother nancy Suh    [2]   Allergies  Allergen Reactions    Diphenhydramine Hcl Other     Confusion     Levaquin [Levofloxacin] Other     Confusion    Novocain [Procaine] Other     Palpatations

## 2025-07-02 PROBLEM — I45.6 WPW (WOLFF-PARKINSON-WHITE SYNDROME): Status: ACTIVE | Noted: 2025-07-01

## 2025-07-07 ENCOUNTER — APPOINTMENT (OUTPATIENT)
Dept: UROLOGY | Facility: CLINIC | Age: 70
End: 2025-07-07
Payer: COMMERCIAL

## 2025-07-09 ENCOUNTER — APPOINTMENT (OUTPATIENT)
Dept: UROLOGY | Facility: CLINIC | Age: 70
End: 2025-07-09
Payer: COMMERCIAL

## 2025-07-18 ENCOUNTER — OFFICE VISIT (OUTPATIENT)
Dept: PRIMARY CARE | Facility: CLINIC | Age: 70
End: 2025-07-18
Payer: MEDICARE

## 2025-07-18 VITALS
RESPIRATION RATE: 16 BRPM | DIASTOLIC BLOOD PRESSURE: 90 MMHG | BODY MASS INDEX: 21.93 KG/M2 | SYSTOLIC BLOOD PRESSURE: 134 MMHG | HEIGHT: 59 IN | HEART RATE: 100 BPM | OXYGEN SATURATION: 96 % | WEIGHT: 108.8 LBS

## 2025-07-18 DIAGNOSIS — I45.6 WOLFF-PARKINSON-WHITE (WPW) SYNDROME: Primary | ICD-10-CM

## 2025-07-18 PROCEDURE — 3080F DIAST BP >= 90 MM HG: CPT | Performed by: INTERNAL MEDICINE

## 2025-07-18 PROCEDURE — 1126F AMNT PAIN NOTED NONE PRSNT: CPT | Performed by: INTERNAL MEDICINE

## 2025-07-18 PROCEDURE — 3008F BODY MASS INDEX DOCD: CPT | Performed by: INTERNAL MEDICINE

## 2025-07-18 PROCEDURE — 93000 ELECTROCARDIOGRAM COMPLETE: CPT | Performed by: INTERNAL MEDICINE

## 2025-07-18 PROCEDURE — 1159F MED LIST DOCD IN RCRD: CPT | Performed by: INTERNAL MEDICINE

## 2025-07-18 PROCEDURE — 99213 OFFICE O/P EST LOW 20 MIN: CPT | Performed by: INTERNAL MEDICINE

## 2025-07-18 PROCEDURE — 3075F SYST BP GE 130 - 139MM HG: CPT | Performed by: INTERNAL MEDICINE

## 2025-07-18 RX ORDER — FLUTICASONE PROPIONATE 50 MCG
1 SPRAY, SUSPENSION (ML) NASAL DAILY
COMMUNITY

## 2025-07-18 ASSESSMENT — PAIN SCALES - GENERAL: PAINLEVEL_OUTOF10: 0-NO PAIN

## 2025-07-18 ASSESSMENT — PATIENT HEALTH QUESTIONNAIRE - PHQ9
SUM OF ALL RESPONSES TO PHQ9 QUESTIONS 1 AND 2: 0
1. LITTLE INTEREST OR PLEASURE IN DOING THINGS: NOT AT ALL
2. FEELING DOWN, DEPRESSED OR HOPELESS: NOT AT ALL

## 2025-07-18 NOTE — PROGRESS NOTES
"Subjective   Danielle Deshpande is a 70 y.o. female who presents for Felton-Parkinson-White Syndrome (Has been feeling a little \"odd\" this week- reports some dizziness and rapid heart rate /Did increase her Flonase to 2 sprays each nostril and not sure if that caused symptoms /Reports that she does feel better today then she has all week /Checking BP at home and readings have been normal).      History of Present Illness  The patient presents for evaluation of arrhythmia, vestibular migraine, and medication management. She is accompanied by her .    Arrhythmia  She has been experiencing frequent episodes of arrhythmia, which have become more regular over the past week. These episodes are characterized by palpitations, lightheadedness, and a sensation of fuzziness in her head that lasts for a few seconds. She also reports feeling jittery and fatigued during these episodes, which can make it difficult for her to concentrate. Her blood pressure readings have been within normal range, with a recent reading of 128/77. She has not made any changes to her heart medications. She is currently taking metoprolol 40 mg and lisinopril, which seem to be effective. She also takes hydroxychloroquine, turmeric, Osteo Bi-Flex, calcium, and cranberry supplements. She takes omeprazole before lunch. She has been on amitriptyline for over 5 years, initially starting at a dose of 5 mg and later increasing to 10 mg due to symptom recurrence. She has reduced her amitriptyline dosage to 10 mg, as recommended by her neurologist, Pam Curry, for the treatment of BPPV and migraines. However, her cardiologist has advised discontinuing amitriptyline due to its potential to cause arrhythmias. She uses Flonase daily for sinus issues and has not made any changes to her heart medications. She recalls that Dr. Dalton had mentioned a potential medication for recurrent issues, but she did not discuss this with Dr. Glover.  - Onset: Frequent " "episodes have become more regular over the past week.  - Location: Sensation of fuzziness in her head.  - Duration: Episodes last for a few seconds.  - Character: Palpitations, lightheadedness, sensation of fuzziness, jittery, fatigued.  - Alleviating/Aggravating Factors: No changes to heart medications; cardiologist advised discontinuing amitriptyline.  - Severity: Blood pressure readings within normal range; recent reading of 128/77.    Vestibular Migraine  She was diagnosed with vestibular migraines by Dr. Dalton and has been on amitriptyline for over 5 years, initially starting at a dose of 5 mg and later increasing to 10 mg due to symptom recurrence. She reports no severe migraine symptoms currently but notes some balance issues in certain weather conditions. She occasionally experiences twitching on one side of her face and eye pain.  - Onset: Diagnosed by Dr. Dalton; on amitriptyline for over 5 years.  - Character: Balance issues in certain weather conditions, occasional twitching on one side of face, eye pain.  - Alleviating/Aggravating Factors: Reduced amitriptyline dosage to 10 mg as recommended by neurologist.  - Severity: No severe migraine symptoms currently.    She has been on turmeric for about 4 to 5 years and is considering discontinuing it due to potential drug interactions.    Social History:  Marital Status:       Review of Systems    Objective   /90   Pulse 100   Resp 16   Ht (!) 1.486 m (4' 10.5\")   Wt 49.4 kg (108 lb 12.8 oz)   SpO2 96%   BMI 22.35 kg/m²       Physical Exam  Constitutional:       General: She is not in acute distress.     Appearance: She is not toxic-appearing.   HENT:      Right Ear: Tympanic membrane, ear canal and external ear normal. There is no impacted cerumen.      Left Ear: Tympanic membrane, ear canal and external ear normal. There is no impacted cerumen.      Nose: Nose normal.      Mouth/Throat:      Mouth: Mucous membranes are dry.      " Pharynx: Oropharynx is clear.     Eyes:      Extraocular Movements: Extraocular movements intact.      Conjunctiva/sclera: Conjunctivae normal.      Pupils: Pupils are equal, round, and reactive to light.       Cardiovascular:      Rate and Rhythm: Regular rhythm. Tachycardia present.      Pulses: Normal pulses.      Heart sounds: Normal heart sounds.      Comments: HR at 100 currently, but this has been consistent with what she has been getting in the past intermittently  Pulmonary:      Effort: Pulmonary effort is normal.      Breath sounds: Normal breath sounds.   Abdominal:      General: Abdomen is flat.     Musculoskeletal:      Cervical back: Normal range of motion and neck supple.      Right lower leg: No edema.      Left lower leg: No edema.     Skin:     General: Skin is warm and dry.     Neurological:      Mental Status: She is alert.     Psychiatric:         Mood and Affect: Mood normal.         Behavior: Behavior normal.         Assessment & Plan  Felton-Parkinson-White (WPW) syndrome    Orders:    ECG 12 lead (Clinic Performed)       Assessment & Plan  1. Arrhythmia: Acute.  - Heart rate is slightly elevated today at 100 bpm.  - Blood pressure readings are within normal range.  - An EKG will be performed today for further evaluation.  - A note has been sent to Dr. Glover to expedite the EP study.  - Consideration of increasing metoprolol dosage to further reduce heart rate.  - Consideration of decreasing lisinopril dosage to prevent excessive lowering of blood pressure.    2. Vestibular Migraine: Chronic.  - No severe migraine symptoms currently, but balance issues noted in certain weather conditions.  - Occasionally experiences twitching on one side of the face and eye pain.  - Continued monitoring of symptoms and current medication regimen.    3. Medication Management.  - Currently on metoprolol 40 mg, lisinopril, hydroxychloroquine, turmeric, Osteo Bi-Flex, calcium, cranberry supplements, and  omeprazole.  - No recent changes to heart medications.  - Discussed the possibility of increasing metoprolol dosage to further reduce heart rate.  - Consideration of decreasing lisinopril dosage to prevent excessive lowering of blood pressure.    Follow-up  - A note has been sent to Dr. Glover to expedite the EP study.      West Pizano DO   This medical note was created with the assistance of artificial intelligence (AI) for documentation purposes. The content has been reviewed and confirmed by the healthcare provider for accuracy and completeness. Patient consented to the use of audio recording and use of AI during their visit.

## 2025-08-11 ENCOUNTER — HOSPITAL ENCOUNTER (OUTPATIENT)
Dept: RADIOLOGY | Facility: HOSPITAL | Age: 70
Discharge: HOME | End: 2025-08-11
Payer: MEDICARE

## 2025-08-11 DIAGNOSIS — N20.0 NEPHROLITHIASIS: ICD-10-CM

## 2025-08-11 PROCEDURE — 74018 RADEX ABDOMEN 1 VIEW: CPT | Mod: COMPUTED RADIOGRAPHY X-RAY | Performed by: RADIOLOGY

## 2025-08-11 PROCEDURE — 74018 RADEX ABDOMEN 1 VIEW: CPT | Mod: FY

## 2025-08-12 LAB
ANION GAP SERPL CALCULATED.4IONS-SCNC: 11 MMOL/L (CALC) (ref 7–17)
BUN SERPL-MCNC: 23 MG/DL (ref 7–25)
BUN/CREAT SERPL: NORMAL (CALC) (ref 6–22)
CALCIUM SERPL-MCNC: 9.2 MG/DL (ref 8.6–10.4)
CHLORIDE SERPL-SCNC: 108 MMOL/L (ref 98–110)
CO2 SERPL-SCNC: 26 MMOL/L (ref 20–32)
CREAT SERPL-MCNC: 0.64 MG/DL (ref 0.6–1)
EGFRCR SERPLBLD CKD-EPI 2021: 95 ML/MIN/1.73M2
GLUCOSE SERPL-MCNC: 84 MG/DL (ref 65–99)
POTASSIUM SERPL-SCNC: 3.6 MMOL/L (ref 3.5–5.3)
SODIUM SERPL-SCNC: 145 MMOL/L (ref 135–146)

## 2025-08-14 ENCOUNTER — APPOINTMENT (OUTPATIENT)
Dept: UROLOGY | Facility: CLINIC | Age: 70
End: 2025-08-14
Payer: COMMERCIAL

## 2025-08-25 ENCOUNTER — APPOINTMENT (OUTPATIENT)
Dept: UROLOGY | Facility: CLINIC | Age: 70
End: 2025-08-25
Payer: COMMERCIAL

## 2025-08-25 DIAGNOSIS — N20.0 NEPHROLITHIASIS: ICD-10-CM

## 2025-08-25 PROCEDURE — G2211 COMPLEX E/M VISIT ADD ON: HCPCS | Performed by: UROLOGY

## 2025-08-25 PROCEDURE — 99214 OFFICE O/P EST MOD 30 MIN: CPT | Performed by: UROLOGY

## 2025-08-25 PROCEDURE — 1036F TOBACCO NON-USER: CPT | Performed by: UROLOGY

## 2025-08-25 PROCEDURE — 1160F RVW MEDS BY RX/DR IN RCRD: CPT | Performed by: UROLOGY

## 2025-08-25 PROCEDURE — 1159F MED LIST DOCD IN RCRD: CPT | Performed by: UROLOGY

## 2025-09-08 ENCOUNTER — APPOINTMENT (OUTPATIENT)
Dept: UROLOGY | Facility: CLINIC | Age: 70
End: 2025-09-08
Payer: COMMERCIAL

## 2025-09-10 ENCOUNTER — APPOINTMENT (OUTPATIENT)
Dept: PRIMARY CARE | Facility: CLINIC | Age: 70
End: 2025-09-10
Payer: COMMERCIAL

## 2025-09-15 ENCOUNTER — APPOINTMENT (OUTPATIENT)
Dept: RHEUMATOLOGY | Facility: CLINIC | Age: 70
End: 2025-09-15
Payer: COMMERCIAL

## 2026-08-24 ENCOUNTER — APPOINTMENT (OUTPATIENT)
Dept: UROLOGY | Facility: CLINIC | Age: 71
End: 2026-08-24
Payer: MEDICARE